# Patient Record
Sex: FEMALE | Race: WHITE | NOT HISPANIC OR LATINO | Employment: OTHER | URBAN - METROPOLITAN AREA
[De-identification: names, ages, dates, MRNs, and addresses within clinical notes are randomized per-mention and may not be internally consistent; named-entity substitution may affect disease eponyms.]

---

## 2017-04-28 ENCOUNTER — TRANSCRIBE ORDERS (OUTPATIENT)
Dept: LAB | Facility: CLINIC | Age: 77
End: 2017-04-28

## 2017-04-28 ENCOUNTER — APPOINTMENT (OUTPATIENT)
Dept: LAB | Facility: CLINIC | Age: 77
End: 2017-04-28
Payer: MEDICARE

## 2017-04-28 DIAGNOSIS — I10 ESSENTIAL HYPERTENSION, MALIGNANT: Primary | ICD-10-CM

## 2017-04-28 DIAGNOSIS — E78.00 PURE HYPERCHOLESTEROLEMIA: ICD-10-CM

## 2017-04-28 DIAGNOSIS — E03.9 UNSPECIFIED HYPOTHYROIDISM: ICD-10-CM

## 2017-04-28 LAB
ALBUMIN SERPL BCP-MCNC: 3.5 G/DL (ref 3.5–5)
ALP SERPL-CCNC: 71 U/L (ref 46–116)
ALT SERPL W P-5'-P-CCNC: 16 U/L (ref 12–78)
ANION GAP SERPL CALCULATED.3IONS-SCNC: 6 MMOL/L (ref 4–13)
AST SERPL W P-5'-P-CCNC: 12 U/L (ref 5–45)
BILIRUB DIRECT SERPL-MCNC: 0.1 MG/DL (ref 0–0.2)
BILIRUB SERPL-MCNC: 0.4 MG/DL (ref 0.2–1)
BUN SERPL-MCNC: 19 MG/DL (ref 5–25)
CALCIUM SERPL-MCNC: 9.5 MG/DL (ref 8.3–10.1)
CHLORIDE SERPL-SCNC: 104 MMOL/L (ref 100–108)
CHOLEST SERPL-MCNC: 159 MG/DL (ref 50–200)
CK SERPL-CCNC: 57 U/L (ref 26–192)
CO2 SERPL-SCNC: 30 MMOL/L (ref 21–32)
CREAT SERPL-MCNC: 0.78 MG/DL (ref 0.6–1.3)
GFR SERPL CREATININE-BSD FRML MDRD: >60 ML/MIN/1.73SQ M
GLUCOSE P FAST SERPL-MCNC: 105 MG/DL (ref 65–99)
HDLC SERPL-MCNC: 52 MG/DL (ref 40–60)
LDLC SERPL CALC-MCNC: 74 MG/DL (ref 0–100)
POTASSIUM SERPL-SCNC: 4.2 MMOL/L (ref 3.5–5.3)
PROT SERPL-MCNC: 7.6 G/DL (ref 6.4–8.2)
SODIUM SERPL-SCNC: 140 MMOL/L (ref 136–145)
TRIGL SERPL-MCNC: 166 MG/DL
TSH SERPL DL<=0.05 MIU/L-ACNC: 0.16 UIU/ML (ref 0.36–3.74)

## 2017-04-28 PROCEDURE — 80048 BASIC METABOLIC PNL TOTAL CA: CPT

## 2017-04-28 PROCEDURE — 80061 LIPID PANEL: CPT

## 2017-04-28 PROCEDURE — 80076 HEPATIC FUNCTION PANEL: CPT

## 2017-04-28 PROCEDURE — 82550 ASSAY OF CK (CPK): CPT

## 2017-04-28 PROCEDURE — 84443 ASSAY THYROID STIM HORMONE: CPT

## 2017-04-28 PROCEDURE — 36415 COLL VENOUS BLD VENIPUNCTURE: CPT

## 2017-05-17 ENCOUNTER — ALLSCRIPTS OFFICE VISIT (OUTPATIENT)
Dept: OTHER | Facility: OTHER | Age: 77
End: 2017-05-17

## 2017-05-17 ENCOUNTER — TRANSCRIBE ORDERS (OUTPATIENT)
Dept: ADMINISTRATIVE | Facility: HOSPITAL | Age: 77
End: 2017-05-17

## 2017-05-17 DIAGNOSIS — G45.9 TRANSIENT CEREBRAL ISCHEMIC ATTACK: ICD-10-CM

## 2017-05-17 DIAGNOSIS — G45.9 UNSPECIFIED TRANSIENT CEREBRAL ISCHEMIA: Primary | ICD-10-CM

## 2017-05-22 ENCOUNTER — TRANSCRIBE ORDERS (OUTPATIENT)
Dept: LAB | Facility: CLINIC | Age: 77
End: 2017-05-22

## 2017-05-22 ENCOUNTER — APPOINTMENT (OUTPATIENT)
Dept: LAB | Facility: CLINIC | Age: 77
End: 2017-05-22
Payer: MEDICARE

## 2017-05-22 DIAGNOSIS — G45.9 TRANSIENT CEREBRAL ISCHEMIC ATTACK: ICD-10-CM

## 2017-05-22 LAB
EST. AVERAGE GLUCOSE BLD GHB EST-MCNC: 128 MG/DL
HBA1C MFR BLD: 6.1 % (ref 4.2–6.3)

## 2017-05-22 PROCEDURE — 36415 COLL VENOUS BLD VENIPUNCTURE: CPT

## 2017-05-22 PROCEDURE — 83036 HEMOGLOBIN GLYCOSYLATED A1C: CPT

## 2017-05-24 ENCOUNTER — HOSPITAL ENCOUNTER (OUTPATIENT)
Dept: RADIOLOGY | Facility: HOSPITAL | Age: 77
Discharge: HOME | End: 2017-05-24
Attending: PSYCHIATRY & NEUROLOGY
Payer: MEDICARE

## 2017-05-24 DIAGNOSIS — G45.9 TRANSIENT CEREBRAL ISCHEMIC ATTACK: ICD-10-CM

## 2017-05-24 PROCEDURE — 70549 MR ANGIOGRAPH NECK W/O&W/DYE: CPT

## 2017-05-24 PROCEDURE — 70544 MR ANGIOGRAPHY HEAD W/O DYE: CPT

## 2017-05-24 PROCEDURE — A9577 INJ MULTIHANCE: HCPCS | Performed by: PSYCHIATRY & NEUROLOGY

## 2017-05-24 RX ADMIN — GADOBENATE DIMEGLUMINE 16 ML: 529 INJECTION, SOLUTION INTRAVENOUS at 08:09

## 2017-05-25 ENCOUNTER — HOSPITAL ENCOUNTER (OUTPATIENT)
Dept: NON INVASIVE DIAGNOSTICS | Facility: HOSPITAL | Age: 77
Discharge: HOME | End: 2017-05-25
Attending: PSYCHIATRY & NEUROLOGY
Payer: MEDICARE

## 2017-05-25 DIAGNOSIS — G45.9 TRANSIENT CEREBRAL ISCHEMIC ATTACK: ICD-10-CM

## 2017-05-25 PROCEDURE — 93306 TTE W/DOPPLER COMPLETE: CPT

## 2017-07-12 ENCOUNTER — TRANSCRIBE ORDERS (OUTPATIENT)
Dept: LAB | Facility: CLINIC | Age: 77
End: 2017-07-12

## 2017-07-12 ENCOUNTER — APPOINTMENT (OUTPATIENT)
Dept: LAB | Facility: CLINIC | Age: 77
End: 2017-07-12
Payer: MEDICARE

## 2017-07-12 DIAGNOSIS — E03.9 UNSPECIFIED HYPOTHYROIDISM: Primary | ICD-10-CM

## 2017-07-12 LAB — TSH SERPL DL<=0.05 MIU/L-ACNC: 7.75 UIU/ML (ref 0.36–3.74)

## 2017-07-12 PROCEDURE — 84443 ASSAY THYROID STIM HORMONE: CPT

## 2017-07-12 PROCEDURE — 36415 COLL VENOUS BLD VENIPUNCTURE: CPT

## 2017-08-22 ENCOUNTER — APPOINTMENT (OUTPATIENT)
Dept: LAB | Facility: CLINIC | Age: 77
End: 2017-08-22
Payer: MEDICARE

## 2017-08-22 ENCOUNTER — TRANSCRIBE ORDERS (OUTPATIENT)
Dept: LAB | Facility: CLINIC | Age: 77
End: 2017-08-22

## 2017-08-22 DIAGNOSIS — B96.20 E. COLI BACTEREMIA: Primary | ICD-10-CM

## 2017-08-22 DIAGNOSIS — R78.81 E. COLI BACTEREMIA: Primary | ICD-10-CM

## 2017-08-22 DIAGNOSIS — Q87.0: ICD-10-CM

## 2017-08-22 LAB
25(OH)D3 SERPL-MCNC: 36.3 NG/ML (ref 30–100)
ANION GAP SERPL CALCULATED.3IONS-SCNC: 7 MMOL/L (ref 4–13)
BUN SERPL-MCNC: 18 MG/DL (ref 5–25)
CALCIUM SERPL-MCNC: 9.5 MG/DL (ref 8.3–10.1)
CHLORIDE SERPL-SCNC: 101 MMOL/L (ref 100–108)
CO2 SERPL-SCNC: 29 MMOL/L (ref 21–32)
CREAT SERPL-MCNC: 1.03 MG/DL (ref 0.6–1.3)
GFR SERPL CREATININE-BSD FRML MDRD: 52 ML/MIN/1.73SQ M
GLUCOSE P FAST SERPL-MCNC: 105 MG/DL (ref 65–99)
POTASSIUM SERPL-SCNC: 3.6 MMOL/L (ref 3.5–5.3)
SODIUM SERPL-SCNC: 137 MMOL/L (ref 136–145)
TSH SERPL DL<=0.05 MIU/L-ACNC: 31.7 UIU/ML (ref 0.36–3.74)

## 2017-08-22 PROCEDURE — 80048 BASIC METABOLIC PNL TOTAL CA: CPT

## 2017-08-22 PROCEDURE — 82306 VITAMIN D 25 HYDROXY: CPT

## 2017-08-22 PROCEDURE — 84443 ASSAY THYROID STIM HORMONE: CPT

## 2017-08-22 PROCEDURE — 36415 COLL VENOUS BLD VENIPUNCTURE: CPT

## 2017-10-24 ENCOUNTER — TRANSCRIBE ORDERS (OUTPATIENT)
Dept: LAB | Facility: CLINIC | Age: 77
End: 2017-10-24

## 2017-10-24 ENCOUNTER — APPOINTMENT (OUTPATIENT)
Dept: LAB | Facility: CLINIC | Age: 77
End: 2017-10-24
Payer: MEDICARE

## 2017-10-24 DIAGNOSIS — E03.9 HYPOTHYROIDISM, UNSPECIFIED TYPE: Primary | ICD-10-CM

## 2017-10-24 PROCEDURE — 36415 COLL VENOUS BLD VENIPUNCTURE: CPT

## 2017-10-24 PROCEDURE — 84443 ASSAY THYROID STIM HORMONE: CPT

## 2017-10-25 LAB — TSH SERPL DL<=0.05 MIU/L-ACNC: 4.82 UIU/ML (ref 0.36–3.74)

## 2018-01-14 VITALS
SYSTOLIC BLOOD PRESSURE: 135 MMHG | BODY MASS INDEX: 30.3 KG/M2 | DIASTOLIC BLOOD PRESSURE: 82 MMHG | WEIGHT: 171 LBS | HEIGHT: 63 IN | HEART RATE: 80 BPM

## 2018-04-13 ENCOUNTER — APPOINTMENT (OUTPATIENT)
Dept: LAB | Facility: CLINIC | Age: 78
End: 2018-04-13
Payer: MEDICARE

## 2018-04-13 ENCOUNTER — TRANSCRIBE ORDERS (OUTPATIENT)
Dept: LAB | Facility: CLINIC | Age: 78
End: 2018-04-13

## 2018-04-13 DIAGNOSIS — R35.0 URINARY FREQUENCY: Primary | ICD-10-CM

## 2018-04-13 DIAGNOSIS — E03.4 IDIOPATHIC ATROPHIC HYPOTHYROIDISM: Primary | ICD-10-CM

## 2018-04-13 DIAGNOSIS — E78.00 PURE HYPERCHOLESTEROLEMIA: ICD-10-CM

## 2018-04-13 DIAGNOSIS — N18.30 CHRONIC KIDNEY DISEASE, STAGE III (MODERATE) (HCC): ICD-10-CM

## 2018-04-13 LAB
ANION GAP SERPL CALCULATED.3IONS-SCNC: 3 MMOL/L (ref 4–13)
BACTERIA UR QL AUTO: ABNORMAL /HPF
BILIRUB UR QL STRIP: NEGATIVE
BUN SERPL-MCNC: 20 MG/DL (ref 5–25)
CALCIUM SERPL-MCNC: 9.7 MG/DL
CHLORIDE SERPL-SCNC: 105 MMOL/L (ref 100–108)
CHOLEST SERPL-MCNC: 197 MG/DL (ref 50–200)
CLARITY UR: CLEAR
CO2 SERPL-SCNC: 30 MMOL/L (ref 21–32)
COLOR UR: YELLOW
CREAT SERPL-MCNC: 0.98 MG/DL (ref 0.6–1.3)
EST. AVERAGE GLUCOSE BLD GHB EST-MCNC: 137 MG/DL
GFR SERPL CREATININE-BSD FRML MDRD: 55 ML/MIN/1.73SQ M
GLUCOSE P FAST SERPL-MCNC: 120 MG/DL (ref 65–99)
GLUCOSE UR STRIP-MCNC: NEGATIVE MG/DL
HBA1C MFR BLD: 6.4 % (ref 4.2–6.3)
HDLC SERPL-MCNC: 44 MG/DL (ref 40–60)
HGB UR QL STRIP.AUTO: ABNORMAL
HYALINE CASTS #/AREA URNS LPF: ABNORMAL /LPF
KETONES UR STRIP-MCNC: NEGATIVE MG/DL
LDLC SERPL CALC-MCNC: 99 MG/DL (ref 0–100)
LEUKOCYTE ESTERASE UR QL STRIP: ABNORMAL
NITRITE UR QL STRIP: NEGATIVE
NON-SQ EPI CELLS URNS QL MICRO: ABNORMAL /HPF
NONHDLC SERPL-MCNC: 153 MG/DL
PH UR STRIP.AUTO: 5.5 [PH] (ref 4.5–8)
POTASSIUM SERPL-SCNC: 3.9 MMOL/L (ref 3.5–5.3)
PROT UR STRIP-MCNC: NEGATIVE MG/DL
RBC #/AREA URNS AUTO: ABNORMAL /HPF
SODIUM SERPL-SCNC: 138 MMOL/L (ref 136–145)
SP GR UR STRIP.AUTO: 1.02 (ref 1–1.03)
TRIGL SERPL-MCNC: 271 MG/DL
TSH SERPL DL<=0.05 MIU/L-ACNC: 2.08 UIU/ML (ref 0.36–3.74)
UROBILINOGEN UR QL STRIP.AUTO: 0.2 E.U./DL
WBC #/AREA URNS AUTO: ABNORMAL /HPF

## 2018-04-13 PROCEDURE — 36415 COLL VENOUS BLD VENIPUNCTURE: CPT

## 2018-04-13 PROCEDURE — 81001 URINALYSIS AUTO W/SCOPE: CPT

## 2018-04-13 PROCEDURE — 80048 BASIC METABOLIC PNL TOTAL CA: CPT

## 2018-04-13 PROCEDURE — 83036 HEMOGLOBIN GLYCOSYLATED A1C: CPT

## 2018-04-13 PROCEDURE — 84443 ASSAY THYROID STIM HORMONE: CPT

## 2018-04-13 PROCEDURE — 80061 LIPID PANEL: CPT

## 2018-08-21 ENCOUNTER — OFFICE VISIT (OUTPATIENT)
Dept: PODIATRY | Facility: CLINIC | Age: 78
End: 2018-08-21
Payer: MEDICARE

## 2018-08-21 VITALS
BODY MASS INDEX: 30.35 KG/M2 | HEIGHT: 63 IN | RESPIRATION RATE: 17 BRPM | DIASTOLIC BLOOD PRESSURE: 76 MMHG | WEIGHT: 171.3 LBS | SYSTOLIC BLOOD PRESSURE: 138 MMHG | HEART RATE: 64 BPM

## 2018-08-21 DIAGNOSIS — L03.031 PARONYCHIA OF TOENAIL OF RIGHT FOOT: ICD-10-CM

## 2018-08-21 DIAGNOSIS — M79.671 RIGHT FOOT PAIN: Primary | ICD-10-CM

## 2018-08-21 DIAGNOSIS — L60.0 INGROWN TOENAIL: ICD-10-CM

## 2018-08-21 PROCEDURE — 99202 OFFICE O/P NEW SF 15 MIN: CPT | Performed by: PODIATRIST

## 2018-08-21 NOTE — PROGRESS NOTES
Assessment/Plan:  Pain  Paronychia  Ingrown toenail  Plan  Patient educated on condition  Nail debrided  Patient will be started on topical antibiotic  We will schedule nail procedure  No problem-specific Assessment & Plan notes found for this encounter  There are no diagnoses linked to this encounter  Subjective:  Patient has complaint of longstanding pain in her right big toe  She suffers from chronic ingrown toenail  She gets multiple episodes  The    Past Medical History:   Diagnosis Date    Hyperlipidemia     Hypertension     Hypothyroidism     MI, old        Past Surgical History:   Procedure Laterality Date    CHOLECYSTECTOMY      CORONARY ANGIOPLASTY      2001     TONSILLECTOMY         Allergies   Allergen Reactions    Iodinated Diagnostic Agents          Current Outpatient Prescriptions:     atenolol (TENORMIN) 25 mg tablet, Take 25 mg by mouth daily  , Disp: , Rfl:     Biotin 2500 MCG CAPS, Take by mouth daily  , Disp: , Rfl:     Cholecalciferol (VITAMIN D-3 PO), Take by mouth daily  , Disp: , Rfl:     olmesartan-hydrochlorothiazide (BENICAR HCT) 20-12 5 MG per tablet, Take 1 tablet by mouth daily  , Disp: , Rfl:     Omega-3 Fatty Acids (FISH OIL) 1200 MG CAPS, Take by mouth 2 (two) times a day , Disp: , Rfl:     ondansetron (ZOFRAN) 4 mg tablet, Take 1 tablet (4 mg total) by mouth every 8 (eight) hours as needed for nausea or vomiting for up to 10 doses  , Disp: 10 tablet, Rfl: 0    simvastatin (ZOCOR) 20 mg tablet, Take 20 mg by mouth daily at bedtime  , Disp: , Rfl:     thyroid (ARMOUR) 120 MG tablet, Take 120 mg by mouth daily  , Disp: , Rfl:     Patient Active Problem List   Diagnosis    Chest pain          Patient ID: Nabil Hernandez is a 76 y o  female      HPI    The following portions of the patient's history were reviewed and updated as appropriate: allergies, current medications, past family history, past medical history, past social history, past surgical history and problem list     Review of Systems      Objective:  Patient's shoes and socks removed  Foot Exam    General  General Appearance: appears stated age and healthy   Orientation: alert and oriented to person, place, and time   Affect: appropriate   Gait: antalgic       Right Foot/Ankle     Inspection and Palpation  Ecchymosis: none  Tenderness: (Right hallux, fibular nail groove)  Swelling: (Right hallux)  Arch: pes planus  Hammertoes: fifth toe  Hallux valgus: no  Hallux limitus: yes  Skin Exam: callus and dry skin;     Neurovascular  Dorsalis pedis: 2+  Superficial peroneal nerve sensation: diminished  Deep peroneal nerve sensation: diminished  Achilles reflex: 2+    Muscle Strength  Ankle dorsiflexion: 4+    Range of Motion    Passive  Ankle dorsiflexion: 5      Tests  Too many toes: positive       Left Foot/Ankle      Inspection and Palpation  Ecchymosis: none  Tenderness: none   Swelling: none   Arch: pes planus  Hammertoes: fifth toe  Skin Exam: callus and dry skin;     Neurovascular  Dorsalis pedis: 2+  Posterior tibial: 2+  Superficial peroneal nerve sensation: diminished  Deep peroneal nerve sensation: diminished  Achilles reflex: 2+    Muscle Strength  Ankle dorsiflexion: 4+    Range of Motion    Passive  Ankle dorsiflexion: 5      Tests  Too many toes: positive         Physical Exam   Constitutional: She appears well-developed and well-nourished  Cardiovascular: Normal rate and regular rhythm  Pulses:       Dorsalis pedis pulses are 2+ on the right side, and 2+ on the left side  Posterior tibial pulses are 2+ on the left side  Feet:   Right Foot:   Skin Integrity: Positive for callus and dry skin  Left Foot:   Skin Integrity: Positive for callus and dry skin  Neurological:   Reflex Scores:       Achilles reflexes are 2+ on the right side and 2+ on the left side  Skin:   Right hallux demonstrates wide incurvated toenail  The fibular aspect the nail plate has become ingrown  There is mild paronychia  No pus

## 2018-08-24 ENCOUNTER — TRANSCRIBE ORDERS (OUTPATIENT)
Dept: ADMINISTRATIVE | Facility: HOSPITAL | Age: 78
End: 2018-08-24

## 2018-08-24 DIAGNOSIS — M81.0 SENILE OSTEOPOROSIS: Primary | ICD-10-CM

## 2018-09-12 ENCOUNTER — OFFICE VISIT (OUTPATIENT)
Dept: PODIATRY | Facility: CLINIC | Age: 78
End: 2018-09-12
Payer: MEDICARE

## 2018-09-12 ENCOUNTER — HOSPITAL ENCOUNTER (OUTPATIENT)
Dept: RADIOLOGY | Facility: HOSPITAL | Age: 78
Discharge: HOME | End: 2018-09-12
Attending: FAMILY MEDICINE
Payer: MEDICARE

## 2018-09-12 VITALS
BODY MASS INDEX: 30.35 KG/M2 | HEIGHT: 63 IN | WEIGHT: 171.3 LBS | DIASTOLIC BLOOD PRESSURE: 70 MMHG | HEART RATE: 67 BPM | RESPIRATION RATE: 16 BRPM | SYSTOLIC BLOOD PRESSURE: 131 MMHG

## 2018-09-12 DIAGNOSIS — L03.031 PARONYCHIA OF TOENAIL OF RIGHT FOOT: ICD-10-CM

## 2018-09-12 DIAGNOSIS — M81.0 SENILE OSTEOPOROSIS: ICD-10-CM

## 2018-09-12 DIAGNOSIS — L60.0 INGROWN TOENAIL: Primary | ICD-10-CM

## 2018-09-12 DIAGNOSIS — M79.671 RIGHT FOOT PAIN: ICD-10-CM

## 2018-09-12 PROCEDURE — 11750 EXCISION NAIL&NAIL MATRIX: CPT | Performed by: PODIATRIST

## 2018-09-12 PROCEDURE — 77080 DXA BONE DENSITY AXIAL: CPT

## 2018-09-12 RX ORDER — LEVOTHYROXINE SODIUM 0.1 MG/1
137 TABLET ORAL DAILY
COMMUNITY

## 2018-09-12 NOTE — PROGRESS NOTES
Nail removal  Date/Time: 9/12/2018 3:43 PM  Performed by: Zofia Kim by: Anam Lacey     Patient location:  Clinic  Other Assisting Provider: No    Consent:     Consent obtained:  Verbal and written    Consent given by:  Patient    Risks discussed:  Bleeding, incomplete removal, infection, pain and permanent nail deformity    Alternatives discussed:  No treatment, delayed treatment, alternative treatment and observation  Universal protocol:     Procedure explained and questions answered to patient or proxy's satisfaction: yes      Site/side marked: yes      Immediately prior to procedure a time out was called: yes      Patient identity confirmed:  Verbally with patient  Location:     Foot:  R big toe (Fibular aspect)  Pre-procedure details:     Skin preparation:  Betadine    Preparation: Patient was prepped and draped in the usual sterile fashion    Anesthesia (see MAR for exact dosages): Anesthesia method: Digital nerve block performed with 6 cc of 2% lidocaine plain  Nail Removal:     Nail removed:  1/5 (Partial 1/5 nail plate removed  This was done on the fibular aspect)    Nail bed sutured: no      Removed nail replaced and anchored: no    Trephination:     Subungual hematoma drained: no    Ingrown nail:     Wedge excision of skin: no      Nail matrix amount removed: Chemical matrixectomy performed  3, 30 second applications of phenol applied to nail matrix on the fibular aspect  Post-procedure details:     Dressing:  4x4 sterile gauze, antibiotic ointment and gauze roll    Patient tolerance of procedure:   Tolerated well, no immediate complications         Foot Exam      Foot Exam     General  General Appearance: appears stated age and healthy   Orientation: alert and oriented to person, place, and time   Affect: appropriate   Gait: antalgic         Right Foot/Ankle      Inspection and Palpation  Ecchymosis: none  Tenderness: (Right hallux, fibular nail groove)  Swelling: (Right hallux)  Arch: pes planus  Hammertoes: fifth toe  Hallux valgus: no  Hallux limitus: yes  Skin Exam: callus and dry skin;      Neurovascular  Dorsalis pedis: 2+  Superficial peroneal nerve sensation: diminished  Deep peroneal nerve sensation: diminished  Achilles reflex: 2+     Muscle Strength  Ankle dorsiflexion: 4+     Range of Motion     Passive  Ankle dorsiflexion: 5        Tests  Too many toes: positive         Left Foot/Ankle       Inspection and Palpation  Ecchymosis: none  Tenderness: none   Swelling: none   Arch: pes planus  Hammertoes: fifth toe  Skin Exam: callus and dry skin;      Neurovascular  Dorsalis pedis: 2+  Posterior tibial: 2+  Superficial peroneal nerve sensation: diminished  Deep peroneal nerve sensation: diminished  Achilles reflex: 2+     Muscle Strength  Ankle dorsiflexion: 4+     Range of Motion     Passive  Ankle dorsiflexion: 5        Tests  Too many toes: positive         Physical Exam   Constitutional: She appears well-developed and well-nourished  Cardiovascular: Normal rate and regular rhythm  Pulses:       Dorsalis pedis pulses are 2+ on the right side, and 2+ on the left side  Posterior tibial pulses are 2+ on the left side  Feet:   Right Foot:   Skin Integrity: Positive for callus and dry skin  Left Foot:   Skin Integrity: Positive for callus and dry skin  Neurological:   Reflex Scores:       Achilles reflexes are 2+ on the right side and 2+ on the left side  Skin:   Right hallux demonstrates wide incurvated toenail  The fibular aspect the nail plate has become ingrown  There is mild paronychia    No pus

## 2018-09-26 ENCOUNTER — OFFICE VISIT (OUTPATIENT)
Dept: PODIATRY | Facility: CLINIC | Age: 78
End: 2018-09-26
Payer: MEDICARE

## 2018-09-26 VITALS
HEIGHT: 63 IN | BODY MASS INDEX: 30.35 KG/M2 | SYSTOLIC BLOOD PRESSURE: 129 MMHG | WEIGHT: 171.3 LBS | HEART RATE: 65 BPM | RESPIRATION RATE: 16 BRPM | DIASTOLIC BLOOD PRESSURE: 77 MMHG

## 2018-09-26 DIAGNOSIS — S91.109A OPEN WOUND OF TOE, INITIAL ENCOUNTER: Primary | ICD-10-CM

## 2018-09-26 PROCEDURE — 99212 OFFICE O/P EST SF 10 MIN: CPT | Performed by: PODIATRIST

## 2018-09-26 NOTE — PROGRESS NOTES
Assessment/Plan:   Open wound of toe  Plan  Wound debrided  Gentian violet applied  Patient will bandage daily for 1 week     There are no diagnoses linked to this encounter  Subjective:  Patient is status post procedure  She is doing well  She has minimal pain    Patient ID: Moraima Hendrix is a 76 y o  female  Past Medical History:   Diagnosis Date    Hyperlipidemia     Hypertension     Hypothyroidism     MI, old        Past Surgical History:   Procedure Laterality Date    CHOLECYSTECTOMY      CORONARY ANGIOPLASTY      2001     TONSILLECTOMY         Allergies   Allergen Reactions    Iodinated Diagnostic Agents          Current Outpatient Prescriptions:     atenolol (TENORMIN) 25 mg tablet, Take 25 mg by mouth daily  , Disp: , Rfl:     Biotin 2500 MCG CAPS, Take by mouth daily  , Disp: , Rfl:     Cholecalciferol (VITAMIN D-3 PO), Take by mouth daily  , Disp: , Rfl:     levothyroxine 100 mcg tablet, Take 100 mcg by mouth daily, Disp: , Rfl:     mupirocin (BACTROBAN) 2 % ointment, Apply topically daily Apply Band-Aid after applying medication to wound, Disp: 15 g, Rfl: 0    olmesartan-hydrochlorothiazide (BENICAR HCT) 20-12 5 MG per tablet, Take 1 tablet by mouth daily  , Disp: , Rfl:     Omega-3 Fatty Acids (FISH OIL) 1200 MG CAPS, Take by mouth 2 (two) times a day , Disp: , Rfl:     ondansetron (ZOFRAN) 4 mg tablet, Take 1 tablet (4 mg total) by mouth every 8 (eight) hours as needed for nausea or vomiting for up to 10 doses  , Disp: 10 tablet, Rfl: 0    simvastatin (ZOCOR) 20 mg tablet, Take 20 mg by mouth daily at bedtime  , Disp: , Rfl:     thyroid (ARMOUR) 120 MG tablet, Take 120 mg by mouth daily  , Disp: , Rfl:     Patient Active Problem List   Diagnosis    Chest pain    Right foot pain    Ingrown toenail    Paronychia of toenail of right foot       HPI    The following portions of the patient's history were reviewed and updated as appropriate: allergies, current medications, past family history, past medical history, past social history, past surgical history and problem list     Review of Systems      Historical Information   Past Medical History:   Diagnosis Date    Hyperlipidemia     Hypertension     Hypothyroidism     MI, old      Past Surgical History:   Procedure Laterality Date    CHOLECYSTECTOMY      CORONARY ANGIOPLASTY      2001     TONSILLECTOMY       Social History   History   Alcohol Use No     History   Drug Use No     Family History:   Family History   Problem Relation Age of Onset    Lung cancer Mother     Cirrhosis Father     Heart attack Sister        Meds/Allergies   Allergies   Allergen Reactions    Iodinated Diagnostic Agents        Current Outpatient Prescriptions on File Prior to Visit   Medication Sig Dispense Refill    atenolol (TENORMIN) 25 mg tablet Take 25 mg by mouth daily   Biotin 2500 MCG CAPS Take by mouth daily   Cholecalciferol (VITAMIN D-3 PO) Take by mouth daily   levothyroxine 100 mcg tablet Take 100 mcg by mouth daily      mupirocin (BACTROBAN) 2 % ointment Apply topically daily Apply Band-Aid after applying medication to wound 15 g 0    olmesartan-hydrochlorothiazide (BENICAR HCT) 20-12 5 MG per tablet Take 1 tablet by mouth daily   Omega-3 Fatty Acids (FISH OIL) 1200 MG CAPS Take by mouth 2 (two) times a day   ondansetron (ZOFRAN) 4 mg tablet Take 1 tablet (4 mg total) by mouth every 8 (eight) hours as needed for nausea or vomiting for up to 10 doses  10 tablet 0    simvastatin (ZOCOR) 20 mg tablet Take 20 mg by mouth daily at bedtime   thyroid (ARMOUR) 120 MG tablet Take 120 mg by mouth daily  No current facility-administered medications on file prior to visit  Objective:  Patient's shoes and socks removed  Foot ExamPhysical Exam         Objective:  Patient's shoes and socks removed     Foot Exam     General  General Appearance: appears stated age and healthy   Orientation: alert and oriented to person, place, and time   Affect: appropriate   Gait: antalgic         Right Foot/Ankle      Inspection and Palpation  Ecchymosis: none  Tenderness: (Right hallux, fibular nail groove)  Swelling: (Right hallux)  Arch: pes planus  Hammertoes: fifth toe  Hallux valgus: no  Hallux limitus: yes  Skin Exam: callus and dry skin;      Neurovascular  Dorsalis pedis: 2+  Superficial peroneal nerve sensation: diminished  Deep peroneal nerve sensation: diminished  Achilles reflex: 2+     Muscle Strength  Ankle dorsiflexion: 4+     Range of Motion     Passive  Ankle dorsiflexion: 5        Tests  Too many toes: positive         Left Foot/Ankle       Inspection and Palpation  Ecchymosis: none  Tenderness: none   Swelling: none   Arch: pes planus  Hammertoes: fifth toe  Skin Exam: callus and dry skin;      Neurovascular  Dorsalis pedis: 2+  Posterior tibial: 2+  Superficial peroneal nerve sensation: diminished  Deep peroneal nerve sensation: diminished  Achilles reflex: 2+     Muscle Strength  Ankle dorsiflexion: 4+     Range of Motion     Passive  Ankle dorsiflexion: 5        Tests  Too many toes: positive         Physical Exam   Constitutional: She appears well-developed and well-nourished  Cardiovascular: Normal rate and regular rhythm  Pulses:       Dorsalis pedis pulses are 2+ on the right side, and 2+ on the left side  Posterior tibial pulses are 2+ on the left side  Feet:   Right Foot:   Skin Integrity: Positive for callus and dry skin  Left Foot:   Skin Integrity: Positive for callus and dry skin  Neurological:   Reflex Scores:       Achilles reflexes are 2+ on the right side and 2+ on the left side  Skin:   Right hallux demonstrates ulcerated nail groove  No paronychia    No evidence of nail recurrence

## 2018-10-02 ENCOUNTER — APPOINTMENT (OUTPATIENT)
Dept: LAB | Facility: CLINIC | Age: 78
End: 2018-10-02
Payer: MEDICARE

## 2018-10-02 ENCOUNTER — TRANSCRIBE ORDERS (OUTPATIENT)
Dept: LAB | Facility: CLINIC | Age: 78
End: 2018-10-02

## 2018-10-02 DIAGNOSIS — E55.9 VITAMIN D DEFICIENCY: ICD-10-CM

## 2018-10-02 DIAGNOSIS — S40.871A OTHER SUPERFICIAL BITE OF RIGHT UPPER ARM, INITIAL ENCOUNTER: Primary | ICD-10-CM

## 2018-10-02 DIAGNOSIS — M25.50 PAIN IN JOINT, MULTIPLE SITES: ICD-10-CM

## 2018-10-02 DIAGNOSIS — Z13.9 SCREENING FOR CONDITION: ICD-10-CM

## 2018-10-02 DIAGNOSIS — E03.4 IDIOPATHIC ATROPHIC HYPOTHYROIDISM: ICD-10-CM

## 2018-10-02 LAB
25(OH)D3 SERPL-MCNC: 20.9 NG/ML (ref 30–100)
ALBUMIN SERPL BCP-MCNC: 3.2 G/DL (ref 3.5–5)
ALP SERPL-CCNC: 65 U/L (ref 46–116)
ALT SERPL W P-5'-P-CCNC: 16 U/L (ref 12–78)
ANION GAP SERPL CALCULATED.3IONS-SCNC: 7 MMOL/L (ref 4–13)
AST SERPL W P-5'-P-CCNC: 12 U/L (ref 5–45)
BASOPHILS # BLD AUTO: 0.07 THOUSANDS/ΜL (ref 0–0.1)
BASOPHILS NFR BLD AUTO: 1 % (ref 0–1)
BILIRUB SERPL-MCNC: 0.25 MG/DL (ref 0.2–1)
BUN SERPL-MCNC: 20 MG/DL (ref 5–25)
CALCIUM SERPL-MCNC: 8.8 MG/DL (ref 8.3–10.1)
CHLORIDE SERPL-SCNC: 102 MMOL/L (ref 100–108)
CO2 SERPL-SCNC: 26 MMOL/L (ref 21–32)
CREAT SERPL-MCNC: 1.09 MG/DL (ref 0.6–1.3)
EOSINOPHIL # BLD AUTO: 0.36 THOUSAND/ΜL (ref 0–0.61)
EOSINOPHIL NFR BLD AUTO: 4 % (ref 0–6)
ERYTHROCYTE [DISTWIDTH] IN BLOOD BY AUTOMATED COUNT: 13.9 % (ref 11.6–15.1)
ERYTHROCYTE [SEDIMENTATION RATE] IN BLOOD: 36 MM/HOUR (ref 0–20)
EST. AVERAGE GLUCOSE BLD GHB EST-MCNC: 137 MG/DL
GFR SERPL CREATININE-BSD FRML MDRD: 48 ML/MIN/1.73SQ M
GLUCOSE P FAST SERPL-MCNC: 116 MG/DL (ref 65–99)
HBA1C MFR BLD: 6.4 % (ref 4.2–6.3)
HCT VFR BLD AUTO: 36.3 % (ref 34.8–46.1)
HGB BLD-MCNC: 11.4 G/DL (ref 11.5–15.4)
IMM GRANULOCYTES # BLD AUTO: 0.05 THOUSAND/UL (ref 0–0.2)
IMM GRANULOCYTES NFR BLD AUTO: 1 % (ref 0–2)
LYMPHOCYTES # BLD AUTO: 4.49 THOUSANDS/ΜL (ref 0.6–4.47)
LYMPHOCYTES NFR BLD AUTO: 43 % (ref 14–44)
MCH RBC QN AUTO: 30.1 PG (ref 26.8–34.3)
MCHC RBC AUTO-ENTMCNC: 31.4 G/DL (ref 31.4–37.4)
MCV RBC AUTO: 96 FL (ref 82–98)
MONOCYTES # BLD AUTO: 0.86 THOUSAND/ΜL (ref 0.17–1.22)
MONOCYTES NFR BLD AUTO: 9 % (ref 4–12)
NEUTROPHILS # BLD AUTO: 4.21 THOUSANDS/ΜL (ref 1.85–7.62)
NEUTS SEG NFR BLD AUTO: 42 % (ref 43–75)
NRBC BLD AUTO-RTO: 0 /100 WBCS
PLATELET # BLD AUTO: 211 THOUSANDS/UL (ref 149–390)
PMV BLD AUTO: 10.7 FL (ref 8.9–12.7)
POTASSIUM SERPL-SCNC: 4.1 MMOL/L (ref 3.5–5.3)
PROT SERPL-MCNC: 7.2 G/DL (ref 6.4–8.2)
RBC # BLD AUTO: 3.79 MILLION/UL (ref 3.81–5.12)
SODIUM SERPL-SCNC: 135 MMOL/L (ref 136–145)
TSH SERPL DL<=0.05 MIU/L-ACNC: 12.9 UIU/ML (ref 0.36–3.74)
WBC # BLD AUTO: 10.04 THOUSAND/UL (ref 4.31–10.16)

## 2018-10-02 PROCEDURE — 85025 COMPLETE CBC W/AUTO DIFF WBC: CPT

## 2018-10-02 PROCEDURE — 36415 COLL VENOUS BLD VENIPUNCTURE: CPT

## 2018-10-02 PROCEDURE — 83036 HEMOGLOBIN GLYCOSYLATED A1C: CPT

## 2018-10-02 PROCEDURE — 82306 VITAMIN D 25 HYDROXY: CPT

## 2018-10-02 PROCEDURE — 84443 ASSAY THYROID STIM HORMONE: CPT

## 2018-10-02 PROCEDURE — 80053 COMPREHEN METABOLIC PANEL: CPT

## 2018-10-02 PROCEDURE — 85652 RBC SED RATE AUTOMATED: CPT

## 2018-12-04 ENCOUNTER — APPOINTMENT (OUTPATIENT)
Dept: LAB | Facility: CLINIC | Age: 78
End: 2018-12-04
Payer: MEDICARE

## 2018-12-04 ENCOUNTER — TRANSCRIBE ORDERS (OUTPATIENT)
Dept: LAB | Facility: CLINIC | Age: 78
End: 2018-12-04

## 2018-12-04 DIAGNOSIS — E03.4 IDIOPATHIC ATROPHIC HYPOTHYROIDISM: Primary | ICD-10-CM

## 2018-12-04 PROCEDURE — 84443 ASSAY THYROID STIM HORMONE: CPT

## 2018-12-04 PROCEDURE — 36415 COLL VENOUS BLD VENIPUNCTURE: CPT

## 2018-12-05 LAB — TSH SERPL DL<=0.05 MIU/L-ACNC: 0.93 UIU/ML (ref 0.36–3.74)

## 2019-03-04 ENCOUNTER — TRANSCRIBE ORDERS (OUTPATIENT)
Dept: LAB | Facility: CLINIC | Age: 79
End: 2019-03-04

## 2019-03-04 ENCOUNTER — APPOINTMENT (OUTPATIENT)
Dept: LAB | Facility: CLINIC | Age: 79
End: 2019-03-04
Payer: MEDICARE

## 2019-03-04 DIAGNOSIS — N18.30 CHRONIC KIDNEY DISEASE, STAGE III (MODERATE) (HCC): Primary | ICD-10-CM

## 2019-03-04 LAB
ANION GAP SERPL CALCULATED.3IONS-SCNC: 3 MMOL/L (ref 4–13)
BUN SERPL-MCNC: 30 MG/DL (ref 5–25)
CALCIUM SERPL-MCNC: 9.4 MG/DL (ref 8.3–10.1)
CHLORIDE SERPL-SCNC: 105 MMOL/L (ref 100–108)
CO2 SERPL-SCNC: 29 MMOL/L (ref 21–32)
CREAT SERPL-MCNC: 0.94 MG/DL (ref 0.6–1.3)
GFR SERPL CREATININE-BSD FRML MDRD: 57 ML/MIN/1.73SQ M
GLUCOSE P FAST SERPL-MCNC: 104 MG/DL (ref 65–99)
POTASSIUM SERPL-SCNC: 4.2 MMOL/L (ref 3.5–5.3)
SODIUM SERPL-SCNC: 137 MMOL/L (ref 136–145)

## 2019-03-04 PROCEDURE — 80048 BASIC METABOLIC PNL TOTAL CA: CPT

## 2019-03-04 PROCEDURE — 36415 COLL VENOUS BLD VENIPUNCTURE: CPT

## 2019-07-25 ENCOUNTER — TRANSCRIBE ORDERS (OUTPATIENT)
Dept: LAB | Facility: CLINIC | Age: 79
End: 2019-07-25

## 2019-07-25 ENCOUNTER — APPOINTMENT (OUTPATIENT)
Dept: LAB | Facility: CLINIC | Age: 79
End: 2019-07-25
Payer: MEDICARE

## 2019-07-25 DIAGNOSIS — E78.00 PURE HYPERCHOLESTEROLEMIA: ICD-10-CM

## 2019-07-25 DIAGNOSIS — E55.9 VITAMIN D DEFICIENCY, UNSPECIFIED: ICD-10-CM

## 2019-07-25 DIAGNOSIS — E03.4 IDIOPATHIC ATROPHIC HYPOTHYROIDISM: Primary | ICD-10-CM

## 2019-07-25 LAB
ALBUMIN SERPL BCP-MCNC: 3.5 G/DL (ref 3.5–5)
ALP SERPL-CCNC: 71 U/L (ref 46–116)
ALT SERPL W P-5'-P-CCNC: 18 U/L (ref 12–78)
ANION GAP SERPL CALCULATED.3IONS-SCNC: 8 MMOL/L (ref 4–13)
AST SERPL W P-5'-P-CCNC: 23 U/L (ref 5–45)
BILIRUB SERPL-MCNC: 0.28 MG/DL (ref 0.2–1)
BUN SERPL-MCNC: 19 MG/DL (ref 5–25)
CALCIUM SERPL-MCNC: 8.9 MG/DL (ref 8.3–10.1)
CHLORIDE SERPL-SCNC: 105 MMOL/L (ref 100–108)
CHOLEST SERPL-MCNC: 217 MG/DL (ref 50–200)
CO2 SERPL-SCNC: 27 MMOL/L (ref 21–32)
CREAT SERPL-MCNC: 1.02 MG/DL (ref 0.6–1.3)
GFR SERPL CREATININE-BSD FRML MDRD: 52 ML/MIN/1.73SQ M
GLUCOSE P FAST SERPL-MCNC: 112 MG/DL (ref 65–99)
HDLC SERPL-MCNC: 41 MG/DL (ref 40–60)
LDLC SERPL CALC-MCNC: 117 MG/DL (ref 0–100)
NONHDLC SERPL-MCNC: 176 MG/DL
POTASSIUM SERPL-SCNC: 4.1 MMOL/L (ref 3.5–5.3)
PROT SERPL-MCNC: 7.2 G/DL (ref 6.4–8.2)
SODIUM SERPL-SCNC: 140 MMOL/L (ref 136–145)
TRIGL SERPL-MCNC: 297 MG/DL
TSH SERPL DL<=0.05 MIU/L-ACNC: 0.49 UIU/ML (ref 0.36–3.74)

## 2019-07-25 PROCEDURE — 36415 COLL VENOUS BLD VENIPUNCTURE: CPT

## 2019-07-25 PROCEDURE — 84443 ASSAY THYROID STIM HORMONE: CPT

## 2019-07-25 PROCEDURE — 80061 LIPID PANEL: CPT

## 2019-07-25 PROCEDURE — 82306 VITAMIN D 25 HYDROXY: CPT

## 2019-07-25 PROCEDURE — 80053 COMPREHEN METABOLIC PANEL: CPT

## 2019-07-26 LAB — 25(OH)D3 SERPL-MCNC: 33.5 NG/ML (ref 30–100)

## 2019-08-15 ENCOUNTER — TRANSCRIBE ORDERS (OUTPATIENT)
Dept: ADMINISTRATIVE | Facility: HOSPITAL | Age: 79
End: 2019-08-15

## 2019-08-15 DIAGNOSIS — E04.9 ENLARGED THYROID: Primary | ICD-10-CM

## 2019-08-21 ENCOUNTER — HOSPITAL ENCOUNTER (OUTPATIENT)
Dept: RADIOLOGY | Facility: HOSPITAL | Age: 79
Discharge: HOME | End: 2019-08-21
Attending: FAMILY MEDICINE
Payer: MEDICARE

## 2019-08-21 DIAGNOSIS — E04.9 ENLARGED THYROID: ICD-10-CM

## 2019-08-21 PROCEDURE — 76536 US EXAM OF HEAD AND NECK: CPT

## 2019-08-28 ENCOUNTER — TRANSCRIBE ORDERS (OUTPATIENT)
Dept: ADMINISTRATIVE | Facility: HOSPITAL | Age: 79
End: 2019-08-28

## 2019-08-28 DIAGNOSIS — R10.9 ABDOMINAL PAIN, UNSPECIFIED ABDOMINAL LOCATION: Primary | ICD-10-CM

## 2019-09-18 ENCOUNTER — HOSPITAL ENCOUNTER (OUTPATIENT)
Dept: RADIOLOGY | Facility: HOSPITAL | Age: 79
Discharge: HOME | End: 2019-09-18
Attending: INTERNAL MEDICINE
Payer: MEDICARE

## 2019-09-18 DIAGNOSIS — R10.9 ABDOMINAL PAIN, UNSPECIFIED ABDOMINAL LOCATION: ICD-10-CM

## 2019-09-18 PROCEDURE — 74177 CT ABD & PELVIS W/CONTRAST: CPT

## 2019-09-18 RX ADMIN — IOHEXOL 100 ML: 350 INJECTION, SOLUTION INTRAVENOUS at 10:37

## 2020-01-09 ENCOUNTER — APPOINTMENT (OUTPATIENT)
Dept: LAB | Facility: CLINIC | Age: 80
End: 2020-01-09
Payer: MEDICARE

## 2020-01-09 ENCOUNTER — TRANSCRIBE ORDERS (OUTPATIENT)
Dept: LAB | Facility: CLINIC | Age: 80
End: 2020-01-09

## 2020-01-09 DIAGNOSIS — N18.30 CHRONIC RENAL DISEASE, STAGE III (HCC): ICD-10-CM

## 2020-01-09 DIAGNOSIS — E78.00 HYPERCHOLESTEREMIA: Primary | ICD-10-CM

## 2020-01-09 DIAGNOSIS — R73.01 IMPAIRED FASTING BLOOD SUGAR: ICD-10-CM

## 2020-01-09 DIAGNOSIS — E03.9 PRIMARY HYPOTHYROIDISM: ICD-10-CM

## 2020-01-09 DIAGNOSIS — E55.9 VITAMIN D DEFICIENCY DISEASE: ICD-10-CM

## 2020-01-09 DIAGNOSIS — E78.00 HYPERCHOLESTEREMIA: ICD-10-CM

## 2020-01-09 LAB
25(OH)D3 SERPL-MCNC: 87.6 NG/ML (ref 30–100)
ALBUMIN SERPL BCP-MCNC: 3.6 G/DL (ref 3.5–5)
ALP SERPL-CCNC: 69 U/L (ref 46–116)
ALT SERPL W P-5'-P-CCNC: 18 U/L (ref 12–78)
ANION GAP SERPL CALCULATED.3IONS-SCNC: 5 MMOL/L (ref 4–13)
AST SERPL W P-5'-P-CCNC: 12 U/L (ref 5–45)
BILIRUB SERPL-MCNC: 0.49 MG/DL (ref 0.2–1)
BUN SERPL-MCNC: 16 MG/DL (ref 5–25)
CALCIUM SERPL-MCNC: 9.5 MG/DL (ref 8.3–10.1)
CHLORIDE SERPL-SCNC: 106 MMOL/L (ref 100–108)
CHOLEST SERPL-MCNC: 131 MG/DL (ref 50–200)
CO2 SERPL-SCNC: 29 MMOL/L (ref 21–32)
CREAT SERPL-MCNC: 1.07 MG/DL (ref 0.6–1.3)
EST. AVERAGE GLUCOSE BLD GHB EST-MCNC: 126 MG/DL
GFR SERPL CREATININE-BSD FRML MDRD: 49 ML/MIN/1.73SQ M
GLUCOSE P FAST SERPL-MCNC: 125 MG/DL (ref 65–99)
HBA1C MFR BLD: 6 % (ref 4.2–6.3)
HDLC SERPL-MCNC: 39 MG/DL
LDLC SERPL CALC-MCNC: 62 MG/DL (ref 0–100)
NONHDLC SERPL-MCNC: 92 MG/DL
POTASSIUM SERPL-SCNC: 4 MMOL/L (ref 3.5–5.3)
PROT SERPL-MCNC: 7.4 G/DL (ref 6.4–8.2)
SODIUM SERPL-SCNC: 140 MMOL/L (ref 136–145)
TRIGL SERPL-MCNC: 152 MG/DL
TSH SERPL DL<=0.05 MIU/L-ACNC: 0.78 UIU/ML (ref 0.36–3.74)

## 2020-01-09 PROCEDURE — 36415 COLL VENOUS BLD VENIPUNCTURE: CPT

## 2020-01-09 PROCEDURE — 80061 LIPID PANEL: CPT

## 2020-01-09 PROCEDURE — 83036 HEMOGLOBIN GLYCOSYLATED A1C: CPT

## 2020-01-09 PROCEDURE — 80053 COMPREHEN METABOLIC PANEL: CPT

## 2020-01-09 PROCEDURE — 84443 ASSAY THYROID STIM HORMONE: CPT

## 2020-01-09 PROCEDURE — 82306 VITAMIN D 25 HYDROXY: CPT

## 2020-07-31 ENCOUNTER — APPOINTMENT (OUTPATIENT)
Dept: LAB | Facility: CLINIC | Age: 80
End: 2020-07-31
Payer: MEDICARE

## 2020-07-31 ENCOUNTER — TRANSCRIBE ORDERS (OUTPATIENT)
Dept: LAB | Facility: CLINIC | Age: 80
End: 2020-07-31

## 2020-07-31 DIAGNOSIS — D51.0 PERNICIOUS ANEMIA: ICD-10-CM

## 2020-07-31 DIAGNOSIS — D50.9 IRON DEFICIENCY ANEMIA, UNSPECIFIED IRON DEFICIENCY ANEMIA TYPE: ICD-10-CM

## 2020-07-31 DIAGNOSIS — R73.01 IMPAIRED FASTING BLOOD SUGAR: Primary | ICD-10-CM

## 2020-07-31 DIAGNOSIS — R73.01 IMPAIRED FASTING BLOOD SUGAR: ICD-10-CM

## 2020-07-31 LAB
ALBUMIN SERPL BCP-MCNC: 3.3 G/DL (ref 3.5–5)
ALP SERPL-CCNC: 63 U/L (ref 46–116)
ALT SERPL W P-5'-P-CCNC: 16 U/L (ref 12–78)
ANION GAP SERPL CALCULATED.3IONS-SCNC: 2 MMOL/L (ref 4–13)
AST SERPL W P-5'-P-CCNC: 10 U/L (ref 5–45)
BILIRUB SERPL-MCNC: 0.4 MG/DL (ref 0.2–1)
BUN SERPL-MCNC: 25 MG/DL (ref 5–25)
CALCIUM SERPL-MCNC: 9.2 MG/DL (ref 8.3–10.1)
CHLORIDE SERPL-SCNC: 104 MMOL/L (ref 100–108)
CO2 SERPL-SCNC: 32 MMOL/L (ref 21–32)
CREAT SERPL-MCNC: 0.97 MG/DL (ref 0.6–1.3)
ERYTHROCYTE [DISTWIDTH] IN BLOOD BY AUTOMATED COUNT: 13.4 % (ref 11.6–15.1)
FERRITIN SERPL-MCNC: 193 NG/ML (ref 8–388)
GFR SERPL CREATININE-BSD FRML MDRD: 55 ML/MIN/1.73SQ M
GLUCOSE P FAST SERPL-MCNC: 98 MG/DL (ref 65–99)
HCT VFR BLD AUTO: 37.9 % (ref 34.8–46.1)
HGB BLD-MCNC: 11.6 G/DL (ref 11.5–15.4)
IRON SATN MFR SERPL: 29 %
IRON SERPL-MCNC: 81 UG/DL (ref 50–170)
MCH RBC QN AUTO: 29.7 PG (ref 26.8–34.3)
MCHC RBC AUTO-ENTMCNC: 30.6 G/DL (ref 31.4–37.4)
MCV RBC AUTO: 97 FL (ref 82–98)
PLATELET # BLD AUTO: 264 THOUSANDS/UL (ref 149–390)
PMV BLD AUTO: 10.6 FL (ref 8.9–12.7)
POTASSIUM SERPL-SCNC: 4.2 MMOL/L (ref 3.5–5.3)
PROT SERPL-MCNC: 7.2 G/DL (ref 6.4–8.2)
RBC # BLD AUTO: 3.91 MILLION/UL (ref 3.81–5.12)
SODIUM SERPL-SCNC: 138 MMOL/L (ref 136–145)
TIBC SERPL-MCNC: 275 UG/DL (ref 250–450)
TSH SERPL DL<=0.05 MIU/L-ACNC: 0.33 UIU/ML (ref 0.36–3.74)
VIT B12 SERPL-MCNC: 261 PG/ML (ref 100–900)
WBC # BLD AUTO: 11.58 THOUSAND/UL (ref 4.31–10.16)

## 2020-07-31 PROCEDURE — 83540 ASSAY OF IRON: CPT

## 2020-07-31 PROCEDURE — 82607 VITAMIN B-12: CPT

## 2020-07-31 PROCEDURE — 83036 HEMOGLOBIN GLYCOSYLATED A1C: CPT

## 2020-07-31 PROCEDURE — 82728 ASSAY OF FERRITIN: CPT

## 2020-07-31 PROCEDURE — 80053 COMPREHEN METABOLIC PANEL: CPT

## 2020-07-31 PROCEDURE — 83550 IRON BINDING TEST: CPT

## 2020-07-31 PROCEDURE — 84443 ASSAY THYROID STIM HORMONE: CPT

## 2020-07-31 PROCEDURE — 36415 COLL VENOUS BLD VENIPUNCTURE: CPT

## 2020-07-31 PROCEDURE — 85027 COMPLETE CBC AUTOMATED: CPT

## 2020-08-01 LAB
EST. AVERAGE GLUCOSE BLD GHB EST-MCNC: 128 MG/DL
HBA1C MFR BLD: 6.1 %

## 2020-09-30 ENCOUNTER — TRANSCRIBE ORDERS (OUTPATIENT)
Dept: LAB | Facility: CLINIC | Age: 80
End: 2020-09-30

## 2020-09-30 ENCOUNTER — APPOINTMENT (OUTPATIENT)
Dept: LAB | Facility: CLINIC | Age: 80
End: 2020-09-30
Payer: MEDICARE

## 2020-09-30 DIAGNOSIS — Z79.899 ENCOUNTER FOR LONG-TERM (CURRENT) USE OF MEDICATIONS: ICD-10-CM

## 2020-09-30 DIAGNOSIS — E03.9 PRIMARY HYPOTHYROIDISM: ICD-10-CM

## 2020-09-30 DIAGNOSIS — I10 ESSENTIAL HYPERTENSION, MALIGNANT: ICD-10-CM

## 2020-09-30 DIAGNOSIS — E78.00 HYPERCHOLESTEROLEMIA: ICD-10-CM

## 2020-09-30 DIAGNOSIS — E03.9 PRIMARY HYPOTHYROIDISM: Primary | ICD-10-CM

## 2020-09-30 LAB
ALBUMIN SERPL BCP-MCNC: 3.7 G/DL (ref 3.5–5)
ALP SERPL-CCNC: 64 U/L (ref 46–116)
ALT SERPL W P-5'-P-CCNC: 16 U/L (ref 12–78)
AST SERPL W P-5'-P-CCNC: 11 U/L (ref 5–45)
BILIRUB DIRECT SERPL-MCNC: 0.1 MG/DL (ref 0–0.2)
BILIRUB SERPL-MCNC: 0.34 MG/DL (ref 0.2–1)
CHOLEST SERPL-MCNC: 179 MG/DL (ref 50–200)
CK SERPL-CCNC: 76 U/L (ref 26–192)
HDLC SERPL-MCNC: 45 MG/DL
LDLC SERPL CALC-MCNC: 75 MG/DL (ref 0–100)
NONHDLC SERPL-MCNC: 134 MG/DL
PROT SERPL-MCNC: 7.3 G/DL (ref 6.4–8.2)
TRIGL SERPL-MCNC: 295 MG/DL
TSH SERPL DL<=0.05 MIU/L-ACNC: 1.25 UIU/ML (ref 0.36–3.74)

## 2020-09-30 PROCEDURE — 82550 ASSAY OF CK (CPK): CPT

## 2020-09-30 PROCEDURE — 36415 COLL VENOUS BLD VENIPUNCTURE: CPT

## 2020-09-30 PROCEDURE — 80061 LIPID PANEL: CPT

## 2020-09-30 PROCEDURE — 80076 HEPATIC FUNCTION PANEL: CPT

## 2020-09-30 PROCEDURE — 84443 ASSAY THYROID STIM HORMONE: CPT

## 2020-11-16 ENCOUNTER — OFFICE VISIT (OUTPATIENT)
Dept: PODIATRY | Facility: CLINIC | Age: 80
End: 2020-11-16
Payer: MEDICARE

## 2020-11-16 VITALS
RESPIRATION RATE: 16 BRPM | DIASTOLIC BLOOD PRESSURE: 76 MMHG | HEART RATE: 71 BPM | HEIGHT: 63 IN | WEIGHT: 175 LBS | BODY MASS INDEX: 31.01 KG/M2 | SYSTOLIC BLOOD PRESSURE: 139 MMHG

## 2020-11-16 DIAGNOSIS — M20.41 HAMMER TOES OF BOTH FEET: ICD-10-CM

## 2020-11-16 DIAGNOSIS — M25.571 ARTHRALGIA OF FOOT, RIGHT: ICD-10-CM

## 2020-11-16 DIAGNOSIS — L03.032 PARONYCHIA OF GREAT TOE OF LEFT FOOT: Primary | ICD-10-CM

## 2020-11-16 DIAGNOSIS — I73.9 PERIPHERAL VASCULAR DISEASE, UNSPECIFIED (HCC): ICD-10-CM

## 2020-11-16 DIAGNOSIS — M79.671 PAIN IN BOTH FEET: ICD-10-CM

## 2020-11-16 DIAGNOSIS — M79.672 PAIN IN BOTH FEET: ICD-10-CM

## 2020-11-16 DIAGNOSIS — M20.42 HAMMER TOES OF BOTH FEET: ICD-10-CM

## 2020-11-16 PROCEDURE — 99213 OFFICE O/P EST LOW 20 MIN: CPT | Performed by: PODIATRIST

## 2020-11-19 ENCOUNTER — HOSPITAL ENCOUNTER (OUTPATIENT)
Dept: RADIOLOGY | Facility: HOSPITAL | Age: 80
Discharge: HOME | End: 2020-11-19
Attending: PODIATRIST
Payer: MEDICARE

## 2020-11-19 ENCOUNTER — TRANSCRIBE ORDERS (OUTPATIENT)
Dept: ADMINISTRATIVE | Facility: HOSPITAL | Age: 80
End: 2020-11-19

## 2020-11-19 DIAGNOSIS — M25.571 ARTHRALGIA OF FOOT, RIGHT: ICD-10-CM

## 2020-11-19 DIAGNOSIS — M25.571 ARTHRALGIA OF FOOT, RIGHT: Primary | ICD-10-CM

## 2020-11-19 PROCEDURE — 73630 X-RAY EXAM OF FOOT: CPT

## 2020-11-30 ENCOUNTER — OFFICE VISIT (OUTPATIENT)
Dept: PODIATRY | Facility: CLINIC | Age: 80
End: 2020-11-30
Payer: MEDICARE

## 2020-11-30 VITALS
SYSTOLIC BLOOD PRESSURE: 124 MMHG | DIASTOLIC BLOOD PRESSURE: 69 MMHG | HEART RATE: 58 BPM | BODY MASS INDEX: 31.01 KG/M2 | WEIGHT: 175 LBS | RESPIRATION RATE: 16 BRPM | HEIGHT: 63 IN

## 2020-11-30 DIAGNOSIS — R23.8 SKIN IRRITATION: Primary | ICD-10-CM

## 2020-11-30 DIAGNOSIS — M79.672 PAIN IN BOTH FEET: ICD-10-CM

## 2020-11-30 DIAGNOSIS — L03.032 PARONYCHIA OF GREAT TOE OF LEFT FOOT: ICD-10-CM

## 2020-11-30 DIAGNOSIS — I73.9 PERIPHERAL VASCULAR DISEASE, UNSPECIFIED (HCC): ICD-10-CM

## 2020-11-30 DIAGNOSIS — M25.571 ARTHRALGIA OF FOOT, RIGHT: ICD-10-CM

## 2020-11-30 DIAGNOSIS — M79.671 PAIN IN BOTH FEET: ICD-10-CM

## 2020-11-30 PROCEDURE — 99213 OFFICE O/P EST LOW 20 MIN: CPT | Performed by: PODIATRIST

## 2020-11-30 RX ORDER — AMMONIUM LACTATE 12 G/100G
CREAM TOPICAL AS NEEDED
Qty: 385 G | Refills: 0 | Status: SHIPPED | OUTPATIENT
Start: 2020-11-30

## 2021-03-11 ENCOUNTER — TRANSCRIBE ORDERS (OUTPATIENT)
Dept: ADMINISTRATIVE | Facility: HOSPITAL | Age: 81
End: 2021-03-11

## 2021-03-11 DIAGNOSIS — Z12.31 ENCOUNTER FOR SCREENING MAMMOGRAM FOR MALIGNANT NEOPLASM OF BREAST: Primary | ICD-10-CM

## 2021-03-11 DIAGNOSIS — Z13.820 SCREENING FOR OSTEOPOROSIS: Primary | ICD-10-CM

## 2021-03-18 ENCOUNTER — HOSPITAL ENCOUNTER (OUTPATIENT)
Dept: RADIOLOGY | Facility: HOSPITAL | Age: 81
Discharge: HOME | End: 2021-03-18
Attending: FAMILY MEDICINE
Payer: MEDICARE

## 2021-03-18 DIAGNOSIS — Z13.820 SCREENING FOR OSTEOPOROSIS: ICD-10-CM

## 2021-03-18 PROCEDURE — 77080 DXA BONE DENSITY AXIAL: CPT

## 2021-03-25 ENCOUNTER — APPOINTMENT (OUTPATIENT)
Dept: LAB | Facility: CLINIC | Age: 81
End: 2021-03-25
Payer: MEDICARE

## 2021-03-25 ENCOUNTER — TRANSCRIBE ORDERS (OUTPATIENT)
Dept: LAB | Facility: CLINIC | Age: 81
End: 2021-03-25

## 2021-03-25 DIAGNOSIS — E78.00 PURE HYPERCHOLESTEROLEMIA: Primary | ICD-10-CM

## 2021-03-25 DIAGNOSIS — E11.9 DIABETES MELLITUS WITHOUT COMPLICATION (HCC): ICD-10-CM

## 2021-03-25 DIAGNOSIS — N18.31 CHRONIC KIDNEY DISEASE (CKD) STAGE G3A/A1, MODERATELY DECREASED GLOMERULAR FILTRATION RATE (GFR) BETWEEN 45-59 ML/MIN/1.73 SQUARE METER AND ALBUMINURIA CREATININE RATIO LESS THAN 30 MG/G (HCC): ICD-10-CM

## 2021-03-25 DIAGNOSIS — E55.9 AVITAMINOSIS D: ICD-10-CM

## 2021-03-25 LAB
25(OH)D3 SERPL-MCNC: 31 NG/ML (ref 30–100)
ALBUMIN SERPL BCP-MCNC: 3.7 G/DL (ref 3.5–5)
ALP SERPL-CCNC: 73 U/L (ref 46–116)
ALT SERPL W P-5'-P-CCNC: 18 U/L (ref 12–78)
ANION GAP SERPL CALCULATED.3IONS-SCNC: 8 MMOL/L (ref 4–13)
AST SERPL W P-5'-P-CCNC: 17 U/L (ref 5–45)
BILIRUB SERPL-MCNC: 0.3 MG/DL (ref 0.2–1)
BUN SERPL-MCNC: 18 MG/DL (ref 5–25)
CALCIUM SERPL-MCNC: 9.3 MG/DL (ref 8.3–10.1)
CHLORIDE SERPL-SCNC: 105 MMOL/L (ref 100–108)
CHOLEST SERPL-MCNC: 199 MG/DL (ref 50–200)
CO2 SERPL-SCNC: 25 MMOL/L (ref 21–32)
CREAT SERPL-MCNC: 1.03 MG/DL (ref 0.6–1.3)
EST. AVERAGE GLUCOSE BLD GHB EST-MCNC: 140 MG/DL
GFR SERPL CREATININE-BSD FRML MDRD: 51 ML/MIN/1.73SQ M
GLUCOSE P FAST SERPL-MCNC: 124 MG/DL (ref 65–99)
HBA1C MFR BLD: 6.5 %
HDLC SERPL-MCNC: 38 MG/DL
LDLC SERPL CALC-MCNC: 90 MG/DL (ref 0–100)
NONHDLC SERPL-MCNC: 161 MG/DL
POTASSIUM SERPL-SCNC: 4.1 MMOL/L (ref 3.5–5.3)
PROT SERPL-MCNC: 7.4 G/DL (ref 6.4–8.2)
SODIUM SERPL-SCNC: 138 MMOL/L (ref 136–145)
TRIGL SERPL-MCNC: 357 MG/DL

## 2021-03-25 PROCEDURE — 80053 COMPREHEN METABOLIC PANEL: CPT

## 2021-03-25 PROCEDURE — 82306 VITAMIN D 25 HYDROXY: CPT

## 2021-03-25 PROCEDURE — 83036 HEMOGLOBIN GLYCOSYLATED A1C: CPT

## 2021-03-25 PROCEDURE — 80061 LIPID PANEL: CPT

## 2021-03-25 PROCEDURE — 36415 COLL VENOUS BLD VENIPUNCTURE: CPT

## 2021-04-22 ENCOUNTER — HOSPITAL ENCOUNTER (OUTPATIENT)
Dept: RADIOLOGY | Facility: HOSPITAL | Age: 81
Discharge: HOME | End: 2021-04-22
Attending: FAMILY MEDICINE
Payer: MEDICARE

## 2021-04-22 VITALS — WEIGHT: 180 LBS | HEIGHT: 63 IN | BODY MASS INDEX: 31.89 KG/M2

## 2021-04-22 DIAGNOSIS — Z12.31 ENCOUNTER FOR SCREENING MAMMOGRAM FOR MALIGNANT NEOPLASM OF BREAST: ICD-10-CM

## 2021-04-22 PROCEDURE — 77067 SCR MAMMO BI INCL CAD: CPT

## 2021-04-22 PROCEDURE — 77063 BREAST TOMOSYNTHESIS BI: CPT

## 2021-06-17 ENCOUNTER — APPOINTMENT (OUTPATIENT)
Dept: LAB | Facility: CLINIC | Age: 81
End: 2021-06-17
Payer: MEDICARE

## 2021-08-03 ENCOUNTER — IMMUNIZATIONS (OUTPATIENT)
Dept: FAMILY MEDICINE CLINIC | Facility: HOSPITAL | Age: 81
End: 2021-08-03

## 2021-08-03 DIAGNOSIS — Z23 ENCOUNTER FOR IMMUNIZATION: Primary | ICD-10-CM

## 2021-08-03 PROCEDURE — 91301 SARS-COV-2 / COVID-19 MRNA VACCINE (MODERNA) 100 MCG: CPT

## 2021-08-03 PROCEDURE — 0011A SARS-COV-2 / COVID-19 MRNA VACCINE (MODERNA) 100 MCG: CPT

## 2021-08-31 ENCOUNTER — IMMUNIZATIONS (OUTPATIENT)
Dept: FAMILY MEDICINE CLINIC | Facility: HOSPITAL | Age: 81
End: 2021-08-31

## 2021-08-31 DIAGNOSIS — Z23 ENCOUNTER FOR IMMUNIZATION: Primary | ICD-10-CM

## 2021-08-31 PROCEDURE — 0012A SARS-COV-2 / COVID-19 MRNA VACCINE (MODERNA) 100 MCG: CPT

## 2021-08-31 PROCEDURE — 91301 SARS-COV-2 / COVID-19 MRNA VACCINE (MODERNA) 100 MCG: CPT

## 2021-10-27 ENCOUNTER — APPOINTMENT (OUTPATIENT)
Dept: LAB | Facility: CLINIC | Age: 81
End: 2021-10-27
Payer: MEDICARE

## 2022-02-25 ENCOUNTER — APPOINTMENT (OUTPATIENT)
Dept: LAB | Facility: CLINIC | Age: 82
End: 2022-02-25
Payer: MEDICARE

## 2022-07-21 ENCOUNTER — TRANSCRIBE ORDERS (OUTPATIENT)
Dept: LAB | Facility: CLINIC | Age: 82
End: 2022-07-21

## 2022-07-21 ENCOUNTER — APPOINTMENT (OUTPATIENT)
Dept: LAB | Facility: CLINIC | Age: 82
End: 2022-07-21
Payer: MEDICARE

## 2022-07-21 DIAGNOSIS — R35.0 URINARY FREQUENCY: Primary | ICD-10-CM

## 2022-07-21 LAB
25(OH)D3 SERPL-MCNC: 34.4 NG/ML (ref 30–100)
ALBUMIN SERPL BCP-MCNC: 3.6 G/DL (ref 3.5–5)
ALP SERPL-CCNC: 71 U/L (ref 46–116)
ALT SERPL W P-5'-P-CCNC: 14 U/L (ref 12–78)
AMYLASE SERPL-CCNC: 54 IU/L (ref 25–115)
ANION GAP SERPL CALCULATED.3IONS-SCNC: 5 MMOL/L (ref 4–13)
AST SERPL W P-5'-P-CCNC: 15 U/L (ref 5–45)
BACTERIA UR QL AUTO: ABNORMAL /HPF
BILIRUB SERPL-MCNC: 0.39 MG/DL (ref 0.2–1)
BILIRUB UR QL STRIP: NEGATIVE
BUN SERPL-MCNC: 20 MG/DL (ref 5–25)
CALCIUM SERPL-MCNC: 9.5 MG/DL (ref 8.3–10.1)
CHLORIDE SERPL-SCNC: 105 MMOL/L (ref 96–108)
CHOLEST SERPL-MCNC: 196 MG/DL
CLARITY UR: ABNORMAL
CO2 SERPL-SCNC: 29 MMOL/L (ref 21–32)
COLOR UR: ABNORMAL
CREAT SERPL-MCNC: 1.05 MG/DL (ref 0.6–1.3)
GFR SERPL CREATININE-BSD FRML MDRD: 49 ML/MIN/1.73SQ M
GLUCOSE P FAST SERPL-MCNC: 122 MG/DL (ref 65–99)
GLUCOSE UR STRIP-MCNC: NEGATIVE MG/DL
HDLC SERPL-MCNC: 43 MG/DL
HGB UR QL STRIP.AUTO: ABNORMAL
KETONES UR STRIP-MCNC: NEGATIVE MG/DL
LDLC SERPL CALC-MCNC: 93 MG/DL (ref 0–100)
LEUKOCYTE ESTERASE UR QL STRIP: ABNORMAL
LIPASE SERPL-CCNC: 168 U/L (ref 73–393)
NITRITE UR QL STRIP: NEGATIVE
NON-SQ EPI CELLS URNS QL MICRO: ABNORMAL /HPF
NONHDLC SERPL-MCNC: 153 MG/DL
PH UR STRIP.AUTO: 6 [PH]
POTASSIUM SERPL-SCNC: 4.2 MMOL/L (ref 3.5–5.3)
PROT SERPL-MCNC: 7.7 G/DL (ref 6.4–8.4)
PROT UR STRIP-MCNC: NEGATIVE MG/DL
RBC #/AREA URNS AUTO: ABNORMAL /HPF
SODIUM SERPL-SCNC: 139 MMOL/L (ref 135–147)
SP GR UR STRIP.AUTO: 1.01 (ref 1–1.03)
TRIGL SERPL-MCNC: 300 MG/DL
TSH SERPL DL<=0.05 MIU/L-ACNC: 1.19 UIU/ML (ref 0.45–4.5)
UROBILINOGEN UR STRIP-ACNC: <2 MG/DL
VIT B12 SERPL-MCNC: 341 PG/ML (ref 100–900)
WBC #/AREA URNS AUTO: ABNORMAL /HPF
WBC CLUMPS # UR AUTO: PRESENT /UL

## 2022-07-21 PROCEDURE — 82150 ASSAY OF AMYLASE: CPT

## 2022-07-21 PROCEDURE — 83690 ASSAY OF LIPASE: CPT

## 2022-07-21 PROCEDURE — 81001 URINALYSIS AUTO W/SCOPE: CPT

## 2022-07-21 PROCEDURE — 80061 LIPID PANEL: CPT

## 2022-07-21 PROCEDURE — 84443 ASSAY THYROID STIM HORMONE: CPT

## 2022-07-21 PROCEDURE — 87086 URINE CULTURE/COLONY COUNT: CPT

## 2022-07-21 PROCEDURE — 87077 CULTURE AEROBIC IDENTIFY: CPT

## 2022-07-21 PROCEDURE — 87186 SC STD MICRODIL/AGAR DIL: CPT

## 2022-07-21 PROCEDURE — 82607 VITAMIN B-12: CPT

## 2022-07-21 PROCEDURE — 82306 VITAMIN D 25 HYDROXY: CPT

## 2022-07-21 PROCEDURE — 36415 COLL VENOUS BLD VENIPUNCTURE: CPT

## 2022-07-21 PROCEDURE — 80053 COMPREHEN METABOLIC PANEL: CPT

## 2022-07-23 LAB — BACTERIA UR CULT: ABNORMAL

## 2022-07-27 RX ORDER — HYDROCHLOROTHIAZIDE 12.5 MG/1
12.5 TABLET ORAL DAILY
COMMUNITY

## 2022-07-27 RX ORDER — CEPHALEXIN 250 MG/1
CAPSULE ORAL 4 TIMES DAILY
COMMUNITY
Start: 2022-07-26 | End: 2022-08-04

## 2022-07-27 RX ORDER — NICOTINE 14MG/24HR
PATCH, TRANSDERMAL 24 HOURS TRANSDERMAL
COMMUNITY

## 2022-07-27 RX ORDER — VALSARTAN 160 MG/1
160 TABLET ORAL DAILY
COMMUNITY

## 2022-07-27 RX ORDER — METOPROLOL SUCCINATE 25 MG/1
25 TABLET, EXTENDED RELEASE ORAL DAILY
COMMUNITY

## 2022-07-27 NOTE — PRE-PROCEDURE INSTRUCTIONS
Pre-Surgery Instructions:   Medication Instructions    ammonium lactate (LAC-HYDRIN) 12 % cream Uses PRN- OK to take day of surgery    Biotin 2500 MCG CAPS Hold day of surgery   cephalexin (KEFLEX) 250 mg capsule Hold day of surgery   Cholecalciferol (VITAMIN D-3 PO) Hold day of surgery   diclofenac sodium (VOLTAREN) 1 % Uses PRN- OK to take day of surgery    hydrochlorothiazide (HYDRODIURIL) 12 5 mg tablet Hold day of surgery   levothyroxine 100 mcg tablet Take day of surgery   metoprolol succinate (TOPROL-XL) 25 mg 24 hr tablet Take day of surgery   Omega-3 Fatty Acids (FISH OIL) 1200 MG CAPS Hold day of surgery   ondansetron (ZOFRAN) 4 mg tablet Hold day of surgery   Saccharomyces boulardii (Probiotic) 250 MG CAPS Hold day of surgery   simvastatin (ZOCOR) 20 mg tablet Take night before surgery    valsartan (DIOVAN) 160 mg tablet Hold day of surgery  Pre op instructions reviewed with pt via phone  Instructed to take metoprolol and synthroid a m of surgery   daughter Jodie Babinski to provide mobile # on arrival  My Surgical Experience    The following information was developed to assist you to prepare for your operation  What do I need to do before coming to the hospital?   Arrange for a responsible person to drive you to and from the hospital    Arrange care for your children at home  Children are not allowed in the recovery areas of the hospital   Plan to wear clothing that is easy to put on and take off  If you are having shoulder surgery, wear a shirt that buttons or zippers in the front  Bathing  o Shower the evening before and the morning of your surgery with an antibacterial soap   Please refer to the Pre Op Showering Instructions for Surgery Patients Sheet   o Remove nail polish and all body piercing jewelry  o Do not shave any body part for at least 24 hours before surgery-this includes face, arms, legs and upper body  Food  o Nothing to eat or drink after midnight the night before your surgery  This includes candy and chewing gum  o Exception: If your surgery is after 12:00pm (noon), you may have clear liquids such as 7-Up®, ginger ale, apple or cranberry juice, Jell-O®, water, or clear broth until 8:00 am  o Do not drink milk or juice with pulp on the morning before surgery  o Do not drink alcohol 24 hours before surgery  Medicine  o Follow instructions you received from your surgeon about which medicines you may take on the day of surgery  o If instructed to take medicine on the morning of surgery, take pills with just a small sip of water  Call your prescribing doctor for specific information on what to do if you take insulin    What should I bring to the hospital?    Bring:  Jorge Hun or a walker, if you have them, for foot or knee surgery   A list of the daily medicines, vitamins, minerals, herbals and nutritional supplements you take  Include the dosages of medicines and the time you take them each day   Glasses, dentures or hearing aids   Minimal clothing; you will be wearing hospital sleepwear   Photo ID; required to verify your identity   If you have a Living Will or Power of , bring a copy of the documents   If you have an ostomy, bring an extra pouch and any supplies you use    Do not bring   Medicines or inhalers   Money, valuables or jewelry    What other information should I know about the day of surgery?  Notify your surgeons if you develop a cold, sore throat, cough, fever, rash or any other illness   Report to the Ambulatory Surgical/Same Day Surgery Unit   You will be instructed to stop at Registration only if you have not been pre-registered   Inform your  fi they do not stay that they will be asked by the staff to leave a phone number where they can be reached   Be available to be reached before surgery   In the event the operating room schedule changes, you may be asked to come in earlier or later than expected    *It is important to tell your doctor and others involved in your health care if you are taking or have been taking any non-prescription drugs, vitamins, minerals, herbals or other nutritional supplements   Any of these may interact with some food or medicines and cause a reaction

## 2022-07-29 ENCOUNTER — HOSPITAL ENCOUNTER (OUTPATIENT)
Dept: RADIOLOGY | Facility: HOSPITAL | Age: 82
Discharge: HOME | End: 2022-07-29
Payer: MEDICARE

## 2022-07-29 ENCOUNTER — HOSPITAL ENCOUNTER (OUTPATIENT)
Dept: RADIOLOGY | Facility: HOSPITAL | Age: 82
Discharge: HOME | End: 2022-07-29
Attending: FAMILY MEDICINE
Payer: MEDICARE

## 2022-07-29 VITALS — BODY MASS INDEX: 31.89 KG/M2 | WEIGHT: 180 LBS | HEIGHT: 63 IN

## 2022-07-29 DIAGNOSIS — N64.4 BREAST PAIN, RIGHT: ICD-10-CM

## 2022-07-29 PROCEDURE — G0279 TOMOSYNTHESIS, MAMMO: HCPCS

## 2022-07-29 PROCEDURE — 77066 DX MAMMO INCL CAD BI: CPT

## 2022-07-31 ENCOUNTER — ANESTHESIA EVENT (OUTPATIENT)
Dept: PERIOP | Facility: AMBULARY SURGERY CENTER | Age: 82
End: 2022-07-31
Payer: MEDICARE

## 2022-08-01 ENCOUNTER — HOSPITAL ENCOUNTER (OUTPATIENT)
Facility: AMBULARY SURGERY CENTER | Age: 82
Setting detail: OUTPATIENT SURGERY
Discharge: HOME | End: 2022-08-01
Attending: OPHTHALMOLOGY | Admitting: OPHTHALMOLOGY
Payer: MEDICARE

## 2022-08-01 ENCOUNTER — ANESTHESIA (OUTPATIENT)
Dept: PERIOP | Facility: AMBULARY SURGERY CENTER | Age: 82
End: 2022-08-01
Payer: MEDICARE

## 2022-08-01 VITALS
HEIGHT: 63 IN | TEMPERATURE: 97.4 F | SYSTOLIC BLOOD PRESSURE: 135 MMHG | BODY MASS INDEX: 31.89 KG/M2 | RESPIRATION RATE: 18 BRPM | WEIGHT: 180 LBS | OXYGEN SATURATION: 97 % | DIASTOLIC BLOOD PRESSURE: 78 MMHG | HEART RATE: 63 BPM

## 2022-08-01 DIAGNOSIS — H25.811 COMBINED FORMS OF AGE-RELATED CATARACT OF RIGHT EYE: Primary | ICD-10-CM

## 2022-08-01 PROBLEM — I10 HTN (HYPERTENSION): Status: ACTIVE | Noted: 2022-08-01

## 2022-08-01 PROBLEM — I25.10 CAD (CORONARY ARTERY DISEASE): Status: ACTIVE | Noted: 2022-08-01

## 2022-08-01 PROBLEM — E78.5 HYPERLIPIDEMIA: Status: ACTIVE | Noted: 2022-08-01

## 2022-08-01 PROBLEM — E03.9 HYPOTHYROIDISM: Status: ACTIVE | Noted: 2022-08-01

## 2022-08-01 PROBLEM — I21.9 MI (MYOCARDIAL INFARCTION) (HCC): Status: ACTIVE | Noted: 2022-08-01

## 2022-08-01 PROCEDURE — V2632 POST CHMBR INTRAOCULAR LENS: HCPCS | Performed by: OPHTHALMOLOGY

## 2022-08-01 DEVICE — ACRYSOF(R) IQ ASPHERIC NATURAL IOL, SINGLE-PIECE ACRYLIC FOLDABLE PCL, UV WITH BLUE LIGHTFILTER, 13.0MM LENGTH, 6.0MM ANTERIORASYMMETRIC BICONVEX OPTIC, PLANAR HAPTICS.
Type: IMPLANTABLE DEVICE | Site: EYE | Status: FUNCTIONAL
Brand: ACRYSOF®

## 2022-08-01 RX ORDER — TETRACAINE HYDROCHLORIDE 5 MG/ML
1 SOLUTION OPHTHALMIC ONCE
Status: COMPLETED | OUTPATIENT
Start: 2022-08-01 | End: 2022-08-01

## 2022-08-01 RX ORDER — LIDOCAINE HYDROCHLORIDE 10 MG/ML
INJECTION, SOLUTION EPIDURAL; INFILTRATION; INTRACAUDAL; PERINEURAL AS NEEDED
Status: DISCONTINUED | OUTPATIENT
Start: 2022-08-01 | End: 2022-08-01 | Stop reason: HOSPADM

## 2022-08-01 RX ORDER — TETRACAINE HYDROCHLORIDE 5 MG/ML
SOLUTION OPHTHALMIC AS NEEDED
Status: DISCONTINUED | OUTPATIENT
Start: 2022-08-01 | End: 2022-08-01 | Stop reason: HOSPADM

## 2022-08-01 RX ORDER — BALANCED SALT SOLUTION 6.4; .75; .48; .3; 3.9; 1.7 MG/ML; MG/ML; MG/ML; MG/ML; MG/ML; MG/ML
SOLUTION OPHTHALMIC AS NEEDED
Status: DISCONTINUED | OUTPATIENT
Start: 2022-08-01 | End: 2022-08-01 | Stop reason: HOSPADM

## 2022-08-01 RX ORDER — SODIUM CHLORIDE, SODIUM LACTATE, POTASSIUM CHLORIDE, CALCIUM CHLORIDE 600; 310; 30; 20 MG/100ML; MG/100ML; MG/100ML; MG/100ML
INJECTION, SOLUTION INTRAVENOUS CONTINUOUS PRN
Status: DISCONTINUED | OUTPATIENT
Start: 2022-08-01 | End: 2022-08-01

## 2022-08-01 RX ORDER — PROPOFOL 10 MG/ML
INJECTION, EMULSION INTRAVENOUS AS NEEDED
Status: DISCONTINUED | OUTPATIENT
Start: 2022-08-01 | End: 2022-08-01

## 2022-08-01 RX ORDER — GATIFLOXACIN 5 MG/ML
1 SOLUTION/ DROPS OPHTHALMIC 2 TIMES DAILY
Qty: 3 ML | Refills: 0
Start: 2022-08-01 | End: 2022-10-11

## 2022-08-01 RX ORDER — MIDAZOLAM HYDROCHLORIDE 2 MG/2ML
INJECTION, SOLUTION INTRAMUSCULAR; INTRAVENOUS AS NEEDED
Status: DISCONTINUED | OUTPATIENT
Start: 2022-08-01 | End: 2022-08-01

## 2022-08-01 RX ORDER — PHENYLEPHRINE HCL 2.5 %
1 DROPS OPHTHALMIC (EYE)
Status: COMPLETED | OUTPATIENT
Start: 2022-08-01 | End: 2022-08-01

## 2022-08-01 RX ORDER — CYCLOPENTOLATE HYDROCHLORIDE 10 MG/ML
1 SOLUTION/ DROPS OPHTHALMIC
Status: COMPLETED | OUTPATIENT
Start: 2022-08-01 | End: 2022-08-01

## 2022-08-01 RX ORDER — LIDOCAINE HYDROCHLORIDE 20 MG/ML
1 JELLY TOPICAL
Status: COMPLETED | OUTPATIENT
Start: 2022-08-01 | End: 2022-08-01

## 2022-08-01 RX ORDER — KETOROLAC TROMETHAMINE 5 MG/ML
1 SOLUTION OPHTHALMIC
Status: COMPLETED | OUTPATIENT
Start: 2022-08-01 | End: 2022-08-01

## 2022-08-01 RX ORDER — KETOROLAC TROMETHAMINE 5 MG/ML
1 SOLUTION OPHTHALMIC 4 TIMES DAILY
Qty: 5 ML | Refills: 0
Start: 2022-08-01 | End: 2022-10-11

## 2022-08-01 RX ORDER — GATIFLOXACIN 5 MG/ML
SOLUTION/ DROPS OPHTHALMIC AS NEEDED
Status: DISCONTINUED | OUTPATIENT
Start: 2022-08-01 | End: 2022-08-01 | Stop reason: HOSPADM

## 2022-08-01 RX ADMIN — KETOROLAC TROMETHAMINE 1 DROP: 5 SOLUTION OPHTHALMIC at 13:05

## 2022-08-01 RX ADMIN — PROPOFOL 20 MG: 10 INJECTION, EMULSION INTRAVENOUS at 13:22

## 2022-08-01 RX ADMIN — KETOROLAC TROMETHAMINE 1 DROP: 5 SOLUTION OPHTHALMIC at 12:20

## 2022-08-01 RX ADMIN — CYCLOPENTOLATE HYDROCHLORIDE 1 DROP: 10 SOLUTION OPHTHALMIC at 12:50

## 2022-08-01 RX ADMIN — TETRACAINE HYDROCHLORIDE 1 DROP: 5 SOLUTION OPHTHALMIC at 12:20

## 2022-08-01 RX ADMIN — MIDAZOLAM 2 MG: 1 INJECTION INTRAMUSCULAR; INTRAVENOUS at 13:11

## 2022-08-01 RX ADMIN — LIDOCAINE HYDROCHLORIDE 1 APPLICATION: 20 JELLY TOPICAL at 12:50

## 2022-08-01 RX ADMIN — LIDOCAINE HYDROCHLORIDE 1 APPLICATION: 20 JELLY TOPICAL at 12:20

## 2022-08-01 RX ADMIN — KETOROLAC TROMETHAMINE 1 DROP: 5 SOLUTION OPHTHALMIC at 12:35

## 2022-08-01 RX ADMIN — CYCLOPENTOLATE HYDROCHLORIDE 1 DROP: 10 SOLUTION OPHTHALMIC at 13:05

## 2022-08-01 RX ADMIN — KETOROLAC TROMETHAMINE 1 DROP: 5 SOLUTION OPHTHALMIC at 12:50

## 2022-08-01 RX ADMIN — PHENYLEPHRINE HYDROCHLORIDE 1 DROP: 25 SOLUTION/ DROPS OPHTHALMIC at 12:50

## 2022-08-01 RX ADMIN — PHENYLEPHRINE HYDROCHLORIDE 1 DROP: 25 SOLUTION/ DROPS OPHTHALMIC at 12:20

## 2022-08-01 RX ADMIN — PHENYLEPHRINE HYDROCHLORIDE 1 DROP: 25 SOLUTION/ DROPS OPHTHALMIC at 12:35

## 2022-08-01 RX ADMIN — CYCLOPENTOLATE HYDROCHLORIDE 1 DROP: 10 SOLUTION OPHTHALMIC at 12:20

## 2022-08-01 RX ADMIN — LIDOCAINE HYDROCHLORIDE 1 APPLICATION: 20 JELLY TOPICAL at 12:35

## 2022-08-01 RX ADMIN — SODIUM CHLORIDE, SODIUM LACTATE, POTASSIUM CHLORIDE, AND CALCIUM CHLORIDE: .6; .31; .03; .02 INJECTION, SOLUTION INTRAVENOUS at 13:05

## 2022-08-01 RX ADMIN — PHENYLEPHRINE HYDROCHLORIDE 1 DROP: 25 SOLUTION/ DROPS OPHTHALMIC at 13:05

## 2022-08-01 RX ADMIN — CYCLOPENTOLATE HYDROCHLORIDE 1 DROP: 10 SOLUTION OPHTHALMIC at 12:35

## 2022-08-01 NOTE — PERIOPERATIVE NURSING NOTE
Patient received into Pacu via stretcher fromOR, patient reactive, assisted into chair, denies pain  Eye shield intact over  right   eye, no drainage or active bleeding noted from  right  eye  Patient given snack and tolerating well

## 2022-08-01 NOTE — ANESTHESIA PREPROCEDURE EVALUATION
Procedure:  EXTRACTION EXTRACAPSULAR CATARACT PHACO INTRAOCULAR LENS (IOL) (Right Eye)    Relevant Problems   CARDIO   (+) CAD (coronary artery disease)   (+) HTN (hypertension)   (+) Hyperlipidemia   (+) MI (myocardial infarction) (HCC)      ENDO   (+) Hypothyroidism        Physical Exam    Airway    Mallampati score: II  TM Distance: >3 FB  Neck ROM: full     Dental       Cardiovascular  Rhythm: regular, Rate: normal,     Pulmonary  Breath sounds clear to auscultation,     Other Findings        Anesthesia Plan  ASA Score- 3     Anesthesia Type- IV sedation with anesthesia with ASA Monitors  Additional Monitors:   Airway Plan:           Plan Factors-    Chart reviewed  Patient is not a current smoker  Induction- intravenous  Postoperative Plan-     Informed Consent- Anesthetic plan and risks discussed with patient  I personally reviewed this patient with the CRNA  Discussed and agreed on the Anesthesia Plan with the CRNA  Sami Sloan

## 2022-08-01 NOTE — OP NOTE
OPERATIVE REPORT    PATIENT NAME: Romayne Churches    :  1940  MRN: [id number]  Pt Location: [facility] OR ROOM 01    Surgery Date: 2022    Surgeon(s) and Role:     * Buddy Tavares MD - Primary    Posterior subcapsular polar age-related cataract, right eye [H25 041]    Post-Op Diagnosis Codes:     * Posterior subcapsular polar age-related cataract, right eye [H25 041]    Procedure(s):  EXTRACTION EXTRACAPSULAR CATARACT PHACO INTRAOCULAR LENS (IOL)    Anesthesia Type:   IV Sedation with Anesthesia    Operative Indications:  Posterior subcapsular polar age-related cataract, right eye [H25 041]  Decreased vision to 20/50  With problems reading  Pt requested cataract sx the right eye    Procedure and Technique:    Procedure Details     The patient was brought in the OR in stable condition and placed on the operative table  The right eye was prepped and draped in the usual sterile manner  Attention was directed to the right eye where a lid speculum was placed  A 2 4 mm clear corneal incision was made temperally  1/2 cc of 1% MPF Lidocaine was irrigated into the anterior chamber followed by viscoat  The side port incision was placed superiorly  The capsularrhexis was made and the nucleus was hydrodissected with BSS  The nucleus was then removed with the phaco handpiece followed by removal of the cortical material with the I/A handpiece  The capsular bag was then filled with Provisc  The IOL was folded and placed in to the capsular bag and centered well  The remaining Provisc was removed from the eye with the I/A  The wounds were hydrated with BSS and found to be water tight  The lid speculum was removed and 2 drops of Gatifloxicin were placed over the cornea  A protective eye shield was taped over the eye and the patient went to PACU in stable condition  I will see the patient in the office tomorrow and the expected post op period is a few weeks         Complications: None        Disposition: PACU   Condition: Stable    SIGNATURE: Hugo Lindsay MD  DATE: August 1, 2022  TIME: 1:35 PM

## 2022-08-01 NOTE — ANESTHESIA POSTPROCEDURE EVALUATION
Post-Op Assessment Note    CV Status:  Stable  Pain Score: 0    Pain management: adequate     Mental Status:  Awake   Hydration Status:  Stable   PONV Controlled:  None   Airway Patency:  Patent      Post Op Vitals Reviewed: Yes      Staff: CRNA   Comments: spontaneously breathing, educated on no operating heavu machinery for the next 24 hours or making legl decisions, endorsed to recovery w/o AC        No complications documented      BP      Temp     Pulse     Resp      SpO2   98

## 2022-08-01 NOTE — DISCHARGE INSTRUCTIONS
Dr Sim Cowden Cataract Instructions    Activity:     1  No Driving until instructed   2  Keep shield on until seen tomorrow except when administering drops   3  No heavy lifting no more than 20 lbs  4   No water in eye for 1 week  Diet:     1  Resume normal diet    Normal Symptoms:     1  Mild Headache   2  Scratchy or picky feeling around eye    Call the office if:     1  You have any questions or concerns   2  If eye pain is not relieved by extra strength tylenol    Office phone number:  [phone]      Next appointment:     1  See Dr Sim Cowden at his office tomorrow as scheduled   _______Tuesday 8/2/2022 at 9:30 am___________________________________________________   2  Bring blue eye kit with you and eyedrops to the office    A new set of comprehensive instructions will be given and reviewed with you during your office visit tomorrow

## 2022-09-23 ENCOUNTER — APPOINTMENT (OUTPATIENT)
Dept: RADIOLOGY | Facility: HOSPITAL | Age: 82
End: 2022-09-23
Payer: MEDICARE

## 2022-09-23 ENCOUNTER — HOSPITAL ENCOUNTER (EMERGENCY)
Facility: HOSPITAL | Age: 82
Discharge: HOME | End: 2022-09-23
Attending: EMERGENCY MEDICINE
Payer: MEDICARE

## 2022-09-23 VITALS
HEIGHT: 63 IN | SYSTOLIC BLOOD PRESSURE: 156 MMHG | OXYGEN SATURATION: 97 % | BODY MASS INDEX: 31.89 KG/M2 | RESPIRATION RATE: 17 BRPM | TEMPERATURE: 97.6 F | HEART RATE: 52 BPM | DIASTOLIC BLOOD PRESSURE: 70 MMHG | WEIGHT: 180 LBS

## 2022-09-23 DIAGNOSIS — R07.89 CHEST WALL PAIN: Primary | ICD-10-CM

## 2022-09-23 LAB
2HR DELTA HS TROPONIN: 1 NG/L
ALBUMIN SERPL BCP-MCNC: 3.6 G/DL (ref 3.5–5)
ALP SERPL-CCNC: 68 U/L (ref 46–116)
ALT SERPL W P-5'-P-CCNC: 14 U/L (ref 12–78)
ANION GAP SERPL CALCULATED.3IONS-SCNC: 6 MMOL/L (ref 4–13)
AST SERPL W P-5'-P-CCNC: 22 U/L (ref 5–45)
ATRIAL RATE: 55 BPM
BASOPHILS # BLD AUTO: 0.06 THOUSANDS/ΜL (ref 0–0.1)
BASOPHILS NFR BLD AUTO: 1 % (ref 0–1)
BILIRUB SERPL-MCNC: 0.45 MG/DL (ref 0.2–1)
BUN SERPL-MCNC: 19 MG/DL (ref 5–25)
CALCIUM SERPL-MCNC: 9 MG/DL (ref 8.3–10.1)
CARDIAC TROPONIN I PNL SERPL HS: 7 NG/L
CARDIAC TROPONIN I PNL SERPL HS: 8 NG/L
CHLORIDE SERPL-SCNC: 102 MMOL/L (ref 96–108)
CO2 SERPL-SCNC: 29 MMOL/L (ref 21–32)
CREAT SERPL-MCNC: 1.01 MG/DL (ref 0.6–1.3)
EOSINOPHIL # BLD AUTO: 0.33 THOUSAND/ΜL (ref 0–0.61)
EOSINOPHIL NFR BLD AUTO: 4 % (ref 0–6)
ERYTHROCYTE [DISTWIDTH] IN BLOOD BY AUTOMATED COUNT: 13.2 % (ref 11.6–15.1)
GFR SERPL CREATININE-BSD FRML MDRD: 51 ML/MIN/1.73SQ M
GLUCOSE SERPL-MCNC: 127 MG/DL (ref 65–140)
HCT VFR BLD AUTO: 39.8 % (ref 34.8–46.1)
HGB BLD-MCNC: 12.5 G/DL (ref 11.5–15.4)
IMM GRANULOCYTES # BLD AUTO: 0.04 THOUSAND/UL (ref 0–0.2)
IMM GRANULOCYTES NFR BLD AUTO: 0 % (ref 0–2)
LYMPHOCYTES # BLD AUTO: 3.42 THOUSANDS/ΜL (ref 0.6–4.47)
LYMPHOCYTES NFR BLD AUTO: 37 % (ref 14–44)
MCH RBC QN AUTO: 30.1 PG (ref 26.8–34.3)
MCHC RBC AUTO-ENTMCNC: 31.4 G/DL (ref 31.4–37.4)
MCV RBC AUTO: 96 FL (ref 82–98)
MONOCYTES # BLD AUTO: 0.76 THOUSAND/ΜL (ref 0.17–1.22)
MONOCYTES NFR BLD AUTO: 8 % (ref 4–12)
NEUTROPHILS # BLD AUTO: 4.54 THOUSANDS/ΜL (ref 1.85–7.62)
NEUTS SEG NFR BLD AUTO: 50 % (ref 43–75)
NRBC BLD AUTO-RTO: 0 /100 WBCS
P AXIS: 18 DEGREES
PLATELET # BLD AUTO: 200 THOUSANDS/UL (ref 149–390)
PMV BLD AUTO: 10.1 FL (ref 8.9–12.7)
POTASSIUM SERPL-SCNC: 4.4 MMOL/L (ref 3.5–5.3)
PR INTERVAL: 204 MS
PROT SERPL-MCNC: 7.7 G/DL (ref 6.4–8.4)
QRS AXIS: -1 DEGREES
QRSD INTERVAL: 88 MS
QT INTERVAL: 410 MS
QTC INTERVAL: 392 MS
RBC # BLD AUTO: 4.15 MILLION/UL (ref 3.81–5.12)
SODIUM SERPL-SCNC: 137 MMOL/L (ref 135–147)
T WAVE AXIS: 44 DEGREES
VENTRICULAR RATE: 55 BPM
WBC # BLD AUTO: 9.15 THOUSAND/UL (ref 4.31–10.16)

## 2022-09-23 PROCEDURE — 93005 ELECTROCARDIOGRAM TRACING: CPT

## 2022-09-23 PROCEDURE — 71045 X-RAY EXAM CHEST 1 VIEW: CPT

## 2022-09-23 PROCEDURE — 85025 COMPLETE CBC W/AUTO DIFF WBC: CPT | Performed by: EMERGENCY MEDICINE

## 2022-09-23 PROCEDURE — 99285 EMERGENCY DEPT VISIT HI MDM: CPT

## 2022-09-23 PROCEDURE — 36415 COLL VENOUS BLD VENIPUNCTURE: CPT | Performed by: EMERGENCY MEDICINE

## 2022-09-23 PROCEDURE — 99284 EMERGENCY DEPT VISIT MOD MDM: CPT | Performed by: EMERGENCY MEDICINE

## 2022-09-23 PROCEDURE — 84484 ASSAY OF TROPONIN QUANT: CPT | Performed by: EMERGENCY MEDICINE

## 2022-09-23 PROCEDURE — 93010 ELECTROCARDIOGRAM REPORT: CPT | Performed by: INTERNAL MEDICINE

## 2022-09-23 PROCEDURE — 80053 COMPREHEN METABOLIC PANEL: CPT | Performed by: EMERGENCY MEDICINE

## 2022-09-23 RX ORDER — ICOSAPENT ETHYL 1000 MG/1
CAPSULE ORAL
COMMUNITY
Start: 2022-07-28

## 2022-09-23 NOTE — ED PROVIDER NOTES
History  Chief Complaint   Patient presents with    Chest Pain     Pt  States started having chest pain last night around 11pm  took 3 baby Asprin and went to bed  Woke up today and   felt ok then began having chest pain again that radiates to right shoulder  No nausea, vomiting, no SOB     81yoF hx CAD,HTN,HLD presents c/o R sided chest pain worse with palpation for a few days  Not exertional            Prior to Admission Medications   Prescriptions Last Dose Informant Patient Reported? Taking? Biotin 2500 MCG CAPS Not Taking at Unknown time  Yes No   Sig: Take by mouth daily  Patient not taking: Reported on 9/23/2022   Cholecalciferol (VITAMIN D-3 PO) Not Taking at Unknown time  Yes No   Sig: Take by mouth daily  Patient not taking: Reported on 9/23/2022   Omega-3 Fatty Acids (FISH OIL) 1200 MG CAPS 9/22/2022 at Unknown time  Yes Yes   Sig: Take by mouth 2 (two) times a day     Saccharomyces boulardii (Probiotic) 250 MG CAPS 9/22/2022 at Unknown time  Yes Yes   Sig: Take by mouth   Vascepa 1 g CAPS 9/22/2022 at Unknown time  Yes Yes   Sig: TAKE 2 CAPSULES BY ORAL ROUTE 2 TIMES EVERY DAY WITH FOOD SWALLOWING WHOLE  DO NOT CHEW, OPEN, DISSOLVE AND/OR CRUSH    ammonium lactate (LAC-HYDRIN) 12 % cream Not Taking at Unknown time  No No   Sig: Apply topically as needed for dry skin   Patient not taking: Reported on 9/23/2022   diclofenac sodium (VOLTAREN) 1 % 9/22/2022 at Unknown time  No Yes   Sig: Apply 2 g topically 4 (four) times a day   gatifloxacin (ZYMAXID) 0 5 % Not Taking at Unknown time  No No   Sig: Administer 1 drop to the right eye 2 (two) times a day   Patient not taking: Reported on 9/23/2022   hydrochlorothiazide (HYDRODIURIL) 12 5 mg tablet 9/22/2022 at Unknown time  Yes Yes   Sig: Take 12 5 mg by mouth daily   ketorolac (ACULAR) 0 5 % ophthalmic solution Not Taking at Unknown time  No No   Sig: Administer 1 drop to the right eye 4 (four) times a day   Patient not taking: Reported on 9/23/2022 levothyroxine 100 mcg tablet 9/23/2022 at Unknown time  Yes Yes   Sig: Take 137 mcg by mouth daily   metoprolol succinate (TOPROL-XL) 25 mg 24 hr tablet 9/23/2022 at Unknown time  Yes Yes   Sig: Take 25 mg by mouth daily   ondansetron (ZOFRAN) 4 mg tablet   No No   Sig: Take 1 tablet (4 mg total) by mouth every 8 (eight) hours as needed for nausea or vomiting for up to 10 doses  simvastatin (ZOCOR) 20 mg tablet 9/22/2022 at Unknown time  Yes Yes   Sig: Take 20 mg by mouth daily at bedtime  valsartan (DIOVAN) 160 mg tablet Not Taking at Unknown time  Yes No   Sig: Take 160 mg by mouth daily   Patient not taking: Reported on 9/23/2022      Facility-Administered Medications: None       Past Medical History:   Diagnosis Date    Coronary artery disease     Hyperlipidemia     Hypertension     Hypothyroidism     MI, old     UTI (urinary tract infection) 07/26/2022       Past Surgical History:   Procedure Laterality Date    CHOLECYSTECTOMY      CORONARY ANGIOPLASTY      2001     IL XCAPSL CTRC RMVL INSJ IO LENS PROSTH W/O ECP Right 8/1/2022    Procedure: EXTRACTION EXTRACAPSULAR CATARACT PHACO INTRAOCULAR LENS (IOL); Surgeon: Tano Chery MD;  Location: [facility] MAIN OR;  Service: Ophthalmology    TONSILLECTOMY         Family History   Problem Relation Age of Onset    Lung cancer Mother     Cirrhosis Father     Heart attack Sister     No Known Problems Daughter     No Known Problems Sister     No Known Problems Sister     No Known Problems Daughter     No Known Problems Daughter     No Known Problems Maternal Aunt     No Known Problems Maternal Aunt     No Known Problems Maternal Aunt     No Known Problems Maternal Aunt     No Known Problems Maternal Aunt      I have reviewed and agree with the history as documented      E-Cigarette/Vaping     E-Cigarette/Vaping Substances     Social History     Tobacco Use    Smoking status: Never Smoker    Smokeless tobacco: Never Used   Substance Use Topics  Alcohol use: No    Drug use: No       Review of Systems   Constitutional: Negative for fever  Respiratory: Negative for cough  Gastrointestinal: Negative for abdominal pain  Musculoskeletal: Negative for back pain  Neurological: Negative for headaches  All other systems reviewed and are negative  Physical Exam  Physical Exam  Vitals reviewed  Constitutional:       Appearance: She is well-developed  HENT:      Head: Normocephalic and atraumatic  Right Ear: External ear normal       Left Ear: External ear normal       Nose: Nose normal  No rhinorrhea  Mouth/Throat:      Mouth: Mucous membranes are moist    Eyes:      Conjunctiva/sclera: Conjunctivae normal    Cardiovascular:      Rate and Rhythm: Normal rate and regular rhythm  Pulmonary:      Effort: Pulmonary effort is normal       Breath sounds: Normal breath sounds  No wheezing or rales  Abdominal:      Palpations: Abdomen is soft  Tenderness: There is no abdominal tenderness  Musculoskeletal:      Cervical back: Neck supple  Right lower leg: No edema  Left lower leg: No edema  Skin:     General: Skin is warm and dry  Neurological:      Mental Status: She is alert and oriented to person, place, and time     Psychiatric:         Mood and Affect: Mood normal          Vital Signs  ED Triage Vitals   Temperature Pulse Respirations Blood Pressure SpO2   09/23/22 1133 09/23/22 1133 09/23/22 1133 09/23/22 1134 09/23/22 1133   97 6 °F (36 4 °C) 64 18 (!) 177/81 96 %      Temp src Heart Rate Source Patient Position - Orthostatic VS BP Location FiO2 (%)   -- 09/23/22 1133 -- -- --    Monitor         Pain Score       09/23/22 1133       3           Vitals:    09/23/22 1134 09/23/22 1245 09/23/22 1315 09/23/22 1345   BP: (!) 177/81 122/60 104/58 156/70   Pulse:  (!) 50  (!) 52         Visual Acuity      ED Medications  Medications - No data to display    Diagnostic Studies  Results Reviewed     Procedure Component Value Units Date/Time    HS Troponin I 2hr [[id number]]  (Normal) Collected: 09/23/22 1343    Lab Status: Final result Specimen: Blood from Arm, Right Updated: 09/23/22 1417     hs TnI 2hr 8 ng/L      Delta 2hr hsTnI 1 ng/L     HS Troponin 0hr (reflex protocol) [[id number]]  (Normal) Collected: 09/23/22 1156    Lab Status: Final result Specimen: Blood from Arm, Right Updated: 09/23/22 1228     hs TnI 0hr 7 ng/L     HS Troponin I 4hr [[id number]]     Lab Status: No result Specimen: Blood     Comprehensive metabolic panel [[id number]] Collected: 09/23/22 1156    Lab Status: Final result Specimen: Blood from Arm, Right Updated: 09/23/22 1222     Sodium 137 mmol/L      Potassium 4 4 mmol/L      Chloride 102 mmol/L      CO2 29 mmol/L      ANION GAP 6 mmol/L      BUN 19 mg/dL      Creatinine 1 01 mg/dL      Glucose 127 mg/dL      Calcium 9 0 mg/dL      AST 22 U/L      ALT 14 U/L      Alkaline Phosphatase 68 U/L      Total Protein 7 7 g/dL      Albumin 3 6 g/dL      Total Bilirubin 0 45 mg/dL      eGFR 51 ml/min/1 73sq m     Narrative:      [place] guidelines for Chronic Kidney Disease (CKD):     Stage 1 with normal or high GFR (GFR > 90 mL/min/1 73 square meters)    Stage 2 Mild CKD (GFR = 60-89 mL/min/1 73 square meters)    Stage 3A Moderate CKD (GFR = 45-59 mL/min/1 73 square meters)    Stage 3B Moderate CKD (GFR = 30-44 mL/min/1 73 square meters)    Stage 4 Severe CKD (GFR = 15-29 mL/min/1 73 square meters)    Stage 5 End Stage CKD (GFR <15 mL/min/1 73 square meters)  Note: GFR calculation is accurate only with a steady state creatinine    CBC and differential [[id number]] Collected: 09/23/22 1156    Lab Status: Final result Specimen: Blood from Arm, Right Updated: 09/23/22 1202     WBC 9 15 Thousand/uL      RBC 4 15 Million/uL      Hemoglobin 12 5 g/dL      Hematocrit 39 8 %      MCV 96 fL      MCH 30 1 pg      MCHC 31 4 g/dL      RDW 13 2 %      MPV 10 1 fL      Platelets 216 Thousands/uL nRBC 0 /100 WBCs      Neutrophils Relative 50 %      Immat GRANS % 0 %      Lymphocytes Relative 37 %      Monocytes Relative 8 %      Eosinophils Relative 4 %      Basophils Relative 1 %      Neutrophils Absolute 4 54 Thousands/µL      Immature Grans Absolute 0 04 Thousand/uL      Lymphocytes Absolute 3 42 Thousands/µL      Monocytes Absolute 0 76 Thousand/µL      Eosinophils Absolute 0 33 Thousand/µL      Basophils Absolute 0 06 Thousands/µL                  XR chest 1 view portable   Final Result by Triny Dodd MD (09/23 1242)      No acute cardiopulmonary disease  Findings are stable            Workstation performed: [id number]                    Procedures  Procedures         ED Course                                             MDM  Number of Diagnoses or Management Options  Diagnosis management comments: Reproducible CP  EKG nonischemic  Trops neg x 2  Cleared for outpt fu with PCP        Disposition  Final diagnoses:   Chest wall pain     Time reflects when diagnosis was documented in both MDM as applicable and the Disposition within this note     Time User Action Codes Description Comment    9/23/2022  3:00 PM Abebe Ilia Add [R07 89] Chest wall pain       ED Disposition     ED Disposition   Discharge    Condition   Stable    Date/Time   Fri Sep 23, 2022  3:00 PM    Comment   Amisha Mata discharge to home/self care  Follow-up Information     Follow up With Specialties Details Why Contact Info    Karmen Mayes DO Family Medicine In 1 week  [address]  [phone]            Patient's Medications   Discharge Prescriptions    No medications on file       No discharge procedures on file      PDMP Review     None          ED Provider  Electronically Signed by           Marlin Atwood DO  09/23/22 1500

## 2022-10-11 NOTE — PRE-PROCEDURE INSTRUCTIONS
Pre-Surgery Instructions:   Medication Instructions   • ammonium lactate (LAC-HYDRIN) 12 % cream Hold day of surgery  • Biotin 2500 MCG CAPS Hold day of surgery  • Cholecalciferol (VITAMIN D-3 PO) Hold day of surgery  • diclofenac sodium (VOLTAREN) 1 % Hold day of surgery  • hydrochlorothiazide (HYDRODIURIL) 12 5 mg tablet Hold day of surgery  • levothyroxine 100 mcg tablet Take day of surgery  • metoprolol succinate (TOPROL-XL) 25 mg 24 hr tablet Take day of surgery  • ondansetron (ZOFRAN) 4 mg tablet Hold day of surgery  • Saccharomyces boulardii (Probiotic) 250 MG CAPS Hold day of surgery  • simvastatin (ZOCOR) 20 mg tablet Hold day of surgery  • valsartan (DIOVAN) 160 mg tablet Hold day of surgery  • Vascepa 1 g CAPS Hold day of surgery  Pre op instructions reviewed with pt via phone  Instructed to take levothyroxine and metoprolol a m of surgery   granddaherbert Steven, to provide mobile # on arrival  My Surgical Experience    The following information was developed to assist you to prepare for your operation  What do I need to do before coming to the hospital?  • Arrange for a responsible person to drive you to and from the hospital   • Arrange care for your children at home  Children are not allowed in the recovery areas of the hospital  • Plan to wear clothing that is easy to put on and take off  If you are having shoulder surgery, wear a shirt that buttons or zippers in the front  Bathing  o Shower the evening before and the morning of your surgery with an antibacterial soap  Please refer to the Pre Op Showering Instructions for Surgery Patients Sheet   o Remove nail polish and all body piercing jewelry  o Do not shave any body part for at least 24 hours before surgery-this includes face, arms, legs and upper body  Food  o Nothing to eat or drink after midnight the night before your surgery   This includes candy and chewing gum  o Exception: If your surgery is after 12:00pm (noon), you may have clear liquids such as 7-Up®, ginger ale, apple or cranberry juice, Jell-O®, water, or clear broth until 8:00 am  o Do not drink milk or juice with pulp on the morning before surgery  o Do not drink alcohol 24 hours before surgery  Medicine  o Follow instructions you received from your surgeon about which medicines you may take on the day of surgery  o If instructed to take medicine on the morning of surgery, take pills with just a small sip of water  Call your prescribing doctor for specific information on what to do if you take insulin    What should I bring to the hospital?    Bring:  • Crutches or a walker, if you have them, for foot or knee surgery  • A list of the daily medicines, vitamins, minerals, herbals and nutritional supplements you take  Include the dosages of medicines and the time you take them each day  • Glasses, dentures or hearing aids  • Minimal clothing; you will be wearing hospital sleepwear  • Photo ID; required to verify your identity  • If you have a Living Will or Power of , bring a copy of the documents  • If you have an ostomy, bring an extra pouch and any supplies you use    Do not bring  • Medicines or inhalers  • Money, valuables or jewelry    What other information should I know about the day of surgery? • Notify your surgeons if you develop a cold, sore throat, cough, fever, rash or any other illness  • Report to the Ambulatory Surgical/Same Day Surgery Unit  • You will be instructed to stop at Registration only if you have not been pre-registered  • Inform your  fi they do not stay that they will be asked by the staff to leave a phone number where they can be reached  • Be available to be reached before surgery   In the event the operating room schedule changes, you may be asked to come in earlier or later than expected    *It is important to tell your doctor and others involved in your health care if you are taking or have been taking any non-prescription drugs, vitamins, minerals, herbals or other nutritional supplements   Any of these may interact with some food or medicines and cause a reaction

## 2022-10-17 ENCOUNTER — ANESTHESIA (OUTPATIENT)
Dept: PERIOP | Facility: AMBULARY SURGERY CENTER | Age: 82
End: 2022-10-17
Payer: MEDICARE

## 2022-10-17 ENCOUNTER — HOSPITAL ENCOUNTER (OUTPATIENT)
Facility: AMBULARY SURGERY CENTER | Age: 82
Setting detail: OUTPATIENT SURGERY
Discharge: HOME | End: 2022-10-17
Attending: OPHTHALMOLOGY | Admitting: OPHTHALMOLOGY
Payer: MEDICARE

## 2022-10-17 ENCOUNTER — ANESTHESIA EVENT (OUTPATIENT)
Dept: PERIOP | Facility: AMBULARY SURGERY CENTER | Age: 82
End: 2022-10-17
Payer: MEDICARE

## 2022-10-17 VITALS
TEMPERATURE: 98.7 F | OXYGEN SATURATION: 96 % | SYSTOLIC BLOOD PRESSURE: 143 MMHG | RESPIRATION RATE: 18 BRPM | DIASTOLIC BLOOD PRESSURE: 61 MMHG | HEART RATE: 54 BPM

## 2022-10-17 DIAGNOSIS — H25.812 COMBINED FORMS OF AGE-RELATED CATARACT OF LEFT EYE: Primary | ICD-10-CM

## 2022-10-17 PROCEDURE — V2632 POST CHMBR INTRAOCULAR LENS: HCPCS | Performed by: OPHTHALMOLOGY

## 2022-10-17 DEVICE — ACRYSOF(R) IQ ASPHERIC NATURAL IOL, SINGLE-PIECE ACRYLIC FOLDABLE PCL, UV WITH BLUE LIGHTFILTER, 13.0MM LENGTH, 6.0MM ANTERIORASYMMETRIC BICONVEX OPTIC, PLANAR HAPTICS.
Type: IMPLANTABLE DEVICE | Site: EYE | Status: FUNCTIONAL
Brand: ACRYSOF®

## 2022-10-17 RX ORDER — TETRACAINE HYDROCHLORIDE 5 MG/ML
1 SOLUTION OPHTHALMIC ONCE
Status: COMPLETED | OUTPATIENT
Start: 2022-10-17 | End: 2022-10-17

## 2022-10-17 RX ORDER — GATIFLOXACIN 5 MG/ML
1 SOLUTION/ DROPS OPHTHALMIC 2 TIMES DAILY
Qty: 3 ML | Refills: 0
Start: 2022-10-17

## 2022-10-17 RX ORDER — BALANCED SALT SOLUTION 6.4; .75; .48; .3; 3.9; 1.7 MG/ML; MG/ML; MG/ML; MG/ML; MG/ML; MG/ML
SOLUTION OPHTHALMIC AS NEEDED
Status: DISCONTINUED | OUTPATIENT
Start: 2022-10-17 | End: 2022-10-17 | Stop reason: HOSPADM

## 2022-10-17 RX ORDER — CYCLOPENTOLATE HYDROCHLORIDE 10 MG/ML
1 SOLUTION/ DROPS OPHTHALMIC
Status: ACTIVE | OUTPATIENT
Start: 2022-10-17 | End: 2022-10-17

## 2022-10-17 RX ORDER — KETOROLAC TROMETHAMINE 5 MG/ML
1 SOLUTION OPHTHALMIC 4 TIMES DAILY
Qty: 5 ML | Refills: 0
Start: 2022-10-17

## 2022-10-17 RX ORDER — PHENYLEPHRINE HCL 2.5 %
1 DROPS OPHTHALMIC (EYE)
Status: ACTIVE | OUTPATIENT
Start: 2022-10-17 | End: 2022-10-17

## 2022-10-17 RX ORDER — KETOROLAC TROMETHAMINE 5 MG/ML
1 SOLUTION OPHTHALMIC
Status: ACTIVE | OUTPATIENT
Start: 2022-10-17 | End: 2022-10-17

## 2022-10-17 RX ORDER — LIDOCAINE HYDROCHLORIDE 10 MG/ML
INJECTION, SOLUTION EPIDURAL; INFILTRATION; INTRACAUDAL; PERINEURAL AS NEEDED
Status: DISCONTINUED | OUTPATIENT
Start: 2022-10-17 | End: 2022-10-17 | Stop reason: HOSPADM

## 2022-10-17 RX ORDER — TETRACAINE HYDROCHLORIDE 5 MG/ML
SOLUTION OPHTHALMIC AS NEEDED
Status: DISCONTINUED | OUTPATIENT
Start: 2022-10-17 | End: 2022-10-17 | Stop reason: HOSPADM

## 2022-10-17 RX ORDER — ONDANSETRON 2 MG/ML
4 INJECTION INTRAMUSCULAR; INTRAVENOUS ONCE AS NEEDED
Status: DISCONTINUED | OUTPATIENT
Start: 2022-10-17 | End: 2022-10-17 | Stop reason: HOSPADM

## 2022-10-17 RX ORDER — MIDAZOLAM HYDROCHLORIDE 2 MG/2ML
INJECTION, SOLUTION INTRAMUSCULAR; INTRAVENOUS AS NEEDED
Status: DISCONTINUED | OUTPATIENT
Start: 2022-10-17 | End: 2022-10-17

## 2022-10-17 RX ORDER — LIDOCAINE HYDROCHLORIDE 20 MG/ML
1 JELLY TOPICAL
Status: COMPLETED | OUTPATIENT
Start: 2022-10-17 | End: 2022-10-17

## 2022-10-17 RX ORDER — GATIFLOXACIN 5 MG/ML
SOLUTION/ DROPS OPHTHALMIC AS NEEDED
Status: DISCONTINUED | OUTPATIENT
Start: 2022-10-17 | End: 2022-10-17 | Stop reason: HOSPADM

## 2022-10-17 RX ADMIN — LIDOCAINE HYDROCHLORIDE 1 APPLICATION: 20 JELLY TOPICAL at 13:15

## 2022-10-17 RX ADMIN — KETOROLAC TROMETHAMINE 1 DROP: 5 SOLUTION OPHTHALMIC at 13:31

## 2022-10-17 RX ADMIN — LIDOCAINE HYDROCHLORIDE 1 APPLICATION: 20 JELLY TOPICAL at 13:46

## 2022-10-17 RX ADMIN — MIDAZOLAM 2 MG: 1 INJECTION INTRAMUSCULAR; INTRAVENOUS at 14:03

## 2022-10-17 RX ADMIN — KETOROLAC TROMETHAMINE 1 DROP: 5 SOLUTION OPHTHALMIC at 13:11

## 2022-10-17 RX ADMIN — KETOROLAC TROMETHAMINE 1 DROP: 5 SOLUTION OPHTHALMIC at 13:46

## 2022-10-17 RX ADMIN — PHENYLEPHRINE HYDROCHLORIDE 1 DROP: 25 SOLUTION/ DROPS OPHTHALMIC at 13:11

## 2022-10-17 RX ADMIN — CYCLOPENTOLATE HYDROCHLORIDE 1 DROP: 10 SOLUTION/ DROPS OPHTHALMIC at 13:46

## 2022-10-17 RX ADMIN — LIDOCAINE HYDROCHLORIDE 1 APPLICATION: 20 JELLY TOPICAL at 13:11

## 2022-10-17 RX ADMIN — CYCLOPENTOLATE HYDROCHLORIDE 1 DROP: 10 SOLUTION/ DROPS OPHTHALMIC at 13:31

## 2022-10-17 RX ADMIN — TETRACAINE HYDROCHLORIDE 1 DROP: 5 SOLUTION OPHTHALMIC at 13:11

## 2022-10-17 RX ADMIN — PHENYLEPHRINE HYDROCHLORIDE 1 DROP: 25 SOLUTION/ DROPS OPHTHALMIC at 13:31

## 2022-10-17 RX ADMIN — CYCLOPENTOLATE HYDROCHLORIDE 1 DROP: 10 SOLUTION/ DROPS OPHTHALMIC at 13:11

## 2022-10-17 RX ADMIN — PHENYLEPHRINE HYDROCHLORIDE 1 DROP: 25 SOLUTION/ DROPS OPHTHALMIC at 13:46

## 2022-10-17 NOTE — DISCHARGE INSTRUCTIONS
Dr Faith Gloria Cataract Instructions    Activity:     1  No Driving until instructed   2  Keep shield on until seen tomorrow except when administering drops   3  No heavy lifting   4  No water in eye     Diet:     1  Resume normal diet    Normal Symptoms:     1  Mild Headache   2  Scratchy or picky feeling around eye    Call the office if:     1  You have any questions or concerns   2  If eye pain is not relieved by extra strength tylenol    Office phone number:  [phone]      Next appointment:     1  See Dr Faith Gloria at his office tomorrow as scheduled   ____8:30 am_____   2  Bring blue eye kit with you and eyedrops to the office    A new set of comprehensive instructions will be given and reviewed with you during your office visit tomorrow

## 2022-10-17 NOTE — ANESTHESIA PREPROCEDURE EVALUATION
Procedure:  EXTRACTION EXTRACAPSULAR CATARACT PHACO INTRAOCULAR LENS (IOL) (Left Eye)    Relevant Problems   ANESTHESIA (within normal limits)      CARDIO   (+) CAD (coronary artery disease)   (+) HTN (hypertension)   (+) Hyperlipidemia   (+) MI (myocardial infarction) (HCC)      ENDO   (+) Hypothyroidism      PULMONARY (within normal limits)        Physical Exam    Airway    Mallampati score: II  TM Distance: >3 FB  Neck ROM: full     Dental   Comment: Denies loose ,     Cardiovascular  Rhythm: regular, Rate: normal,     Pulmonary  Breath sounds clear to auscultation,     Other Findings        Anesthesia Plan  ASA Score- 3     Anesthesia Type- IV sedation with anesthesia with ASA Monitors  Additional Monitors:   Airway Plan:           Plan Factors-Exercise tolerance (METS): >4 METS  Chart reviewed  Existing labs reviewed  Patient summary reviewed  Patient is not a current smoker  Induction- intravenous  Postoperative Plan-     Informed Consent- Anesthetic plan and risks discussed with patient  I personally reviewed this patient with the CRNA  Discussed and agreed on the Anesthesia Plan with the CRNA  Ludin Orellana

## 2022-10-17 NOTE — ANESTHESIA POSTPROCEDURE EVALUATION
Post-Op Assessment Note    CV Status:  Stable  Pain Score: 0    Pain management: adequate     Mental Status:  Alert and awake   Hydration Status:  Stable   PONV Controlled:  None   Airway Patency:  Patent and adequate      Post Op Vitals Reviewed: Yes      Staff: CRNA         No complications documented      BP      Temp      Pulse     Resp      SpO2

## 2022-10-17 NOTE — OP NOTE
OPERATIVE REPORT    PATIENT NAME: Amisha Mata    :  1940  MRN: [id number]  Pt Location: [facility] OR ROOM 01    Surgery Date: 10/17/2022    Surgeon(s) and Role:     * Benito Winslow MD - Primary    Age-related nuclear cataract, left eye [H25 12]    Post-Op Diagnosis Codes:     * Age-related nuclear cataract, left eye [H25 12]    Procedure(s):  EXTRACTION EXTRACAPSULAR CATARACT PHACO INTRAOCULAR LENS (IOL)    Anesthesia Type:   IV Sedation with Anesthesia    Operative Indications:  Age-related nuclear cataract, left eye [H25 12]  Decreased vision to 20/40  With problems reading  Pt requested cataract sx the left eye    Procedure and Technique:    Procedure Details     The patient was brought in the OR in stable condition and placed on the operative table  The left eye was prepped and draped in the usual sterile manner  Attention was directed to the left eye where a lid speculum was placed  A 2 4 mm clear corneal incision was made temperally  1/2 cc of 1% MPF Lidocaine was irrigated into the anterior chamber followed by viscoat  The side port incision was placed superiorly  The capsularrhexis was made and the nucleus was hydrodissected with BSS  The nucleus was then removed with the phaco handpiece followed by removal of the cortical material with the I/A handpiece  The capsular bag was then filled with Provisc  The IOL was folded and placed in to the capsular bag and centered well  The remaining Provisc was removed from the eye with the I/A  The wounds were hydrated with BSS and found to be water tight  The lid speculum was removed and 2 drops of Gatifloxicin were placed over the cornea  A protective eye shield was taped over the eye and the patient went to PACU in stable condition  I will see the patient in the office tomorrow and the expected post op period is a few weeks         Complications: None        Disposition: PACU   Condition: Stable    SIGNATURE: Benito Winslow MD  DATE:  2022  TIME: 2:22 PM

## 2023-02-23 ENCOUNTER — OFFICE VISIT (OUTPATIENT)
Dept: DERMATOLOGY | Age: 83
End: 2023-02-23

## 2023-02-23 VITALS — BODY MASS INDEX: 32.82 KG/M2 | WEIGHT: 185.2 LBS | HEIGHT: 63 IN | TEMPERATURE: 98.6 F

## 2023-02-23 DIAGNOSIS — D18.01 CHERRY ANGIOMA: ICD-10-CM

## 2023-02-23 DIAGNOSIS — L81.4 SOLAR LENTIGO: ICD-10-CM

## 2023-02-23 DIAGNOSIS — L82.1 SEBORRHEIC KERATOSIS: Primary | ICD-10-CM

## 2023-02-23 RX ORDER — NITROGLYCERIN 0.4 MG/1
TABLET SUBLINGUAL
COMMUNITY
Start: 2022-09-29

## 2023-02-23 RX ORDER — CHOLECALCIFEROL (VITAMIN D3) 100000/G
50 POWDER (GRAM) MISCELLANEOUS DAILY
COMMUNITY

## 2023-02-23 RX ORDER — LEVOTHYROXINE SODIUM 137 MCG
137 TABLET ORAL DAILY
COMMUNITY
Start: 2023-02-08

## 2023-02-23 NOTE — PATIENT INSTRUCTIONS
LENTIGO    Assessment and Plan:  Based on a thorough discussion of this condition and the management approach to it (including a comprehensive discussion of the known risks, side effects and potential benefits of treatment), the patient (family) agrees to implement the following specific plan:  Monitor for changes   Full skin recommended every 2 years     What is a lentigo? A lentigo is a pigmented flat or slightly raised lesion with a clearly defined edge  Unlike an ephelis (freckle), it does not fade in the winter months  There are several kinds of lentigo  The name lentigo originally referred to its appearance resembling a small lentil  The plural of lentigo is lentigines, although “lentigos” is also in common use  SEBORRHEIC KERATOSIS; NON-INFLAMED     Assessment and Plan:  Based on a thorough discussion of this condition and the management approach to it (including a comprehensive discussion of the known risks, side effects and potential benefits of treatment), the patient (family) agrees to implement the following specific plan:  Reassured benign   Removal discussed if becomes bothersome $150 for up to 10 lesions (cosmetic)     Seborrheic Keratosis  A seborrheic keratosis is a harmless warty spot that appears during adult life as a common sign of skin aging  Seborrheic keratoses can arise on any area of skin, covered or uncovered, with the usual exception of the palms and soles  They do not arise from mucous membranes  Seborrheic keratoses can have highly variable appearance  Seborrheic keratoses are extremely common  It has been estimated that over 90% of adults over the age of 61 years have one or more of them  They occur in males and females of all races, typically beginning to erupt in the 35s or 45s  They are uncommon under the age of 21 years  The precise cause of seborrhoeic keratoses is not known  Seborrhoeic keratoses are considered degenerative in nature   As time goes by, seborrheic keratoses tend to become more numerous  Some people inherit a tendency to develop a very large number of them; some people may have hundreds of them  PALMAR PITS     Assessment and Plan:  Based on a thorough discussion of this condition and the management approach to it (including a comprehensive discussion of the known risks, side effects and potential benefits of treatment), the patient (family) agrees to implement the following specific plan:  Reassured benign     CHERRY ANGIOMAS    Assessment and Plan:  Based on a thorough discussion of this condition and the management approach to it (including a comprehensive discussion of the known risks, side effects and potential benefits of treatment), the patient (family) agrees to implement the following specific plan:  Reassured benign   Removal discussed if becomes bothersome $150 for up to 10 lesions (cosmetic)     Assessment and Plan:    Cherry angioma, also known as Pranav Curling spots, are benign vascular skin lesions  A "cherry angioma" is a firm red, blue or purple papule, 0 1-1 cm in diameter  When thrombosed, they can appear black in colour until evaluated with a dermatoscope when the red or purple colour is more easily seen  Cherry angioma may develop on any part of the body but most often appear on the scalp, face, lips and trunk  An angioma is due to proliferating endothelial cells; these are the cells that line the inside of a blood vessel

## 2023-02-23 NOTE — PROGRESS NOTES
[facility] Note     Patient Name: Mona Luna  Encounter Date: 02/23/2023     Have you been cared for by a [place] [id number] Dermatologist in the last 3 years and, if so, which description applies to you? NO  I am considered a "new" patient and must complete all patient intake questions  I am FEMALE/of child-bearing potential     REVIEW OF SYSTEMS:  Have you recently had or currently have any of the following? · Recent fever or chills? No  · Any non-healing wound? No  · Are you pregnant or planning to become pregnant? No  · Are you currently or planning to be nursing or breast feeding? No   PAST MEDICAL HISTORY:  Have you personally ever had or currently have any of the following? If "YES," then please provide more detail  · Skin cancer (such as Melanoma, Basal Cell Carcinoma, Squamous Cell Carcinoma? No  · Tuberculosis, HIV/AIDS, Hepatitis B or C: No  · Systemic Immunosuppression such as Diabetes, Biologic or Immunotherapy, Chemotherapy, Organ Transplantation, Bone Marrow Transplantation No  · Radiation Treatment No   FAMILY HISTORY:  Any "first degree relatives" (parent, brother, sister, or child) with the following? • Skin Cancer, Pancreatic or Other Cancer? No   PATIENT EXPERIENCE:    • Do you want the Dermatologist to perform a COMPLETE skin exam today including a clinical examination under the "bra and underwear" areas? Yes  • If necessary, do we have your permission to call and leave a detailed message on your Preferred Phone number that includes your specific medical information?   Yes      Allergies   Allergen Reactions   • Iodinated Contrast Media Hives      Current Outpatient Medications:   •  ammonium lactate (LAC-HYDRIN) 12 % cream, Apply topically as needed for dry skin, Disp: 385 g, Rfl: 0  •  Biotin 2500 MCG CAPS, Take by mouth daily, Disp: , Rfl:   •  Cholecalciferol (VITAMIN D-3 PO), Take by mouth daily, Disp: , Rfl:   •  diclofenac sodium (VOLTAREN) 1 %, Apply 2 g topically 4 (four) times a day, Disp: 1 Tube, Rfl: 3  •  gatifloxacin (ZYMAXID) 0 5 %, Administer 1 drop into the left eye 2 (two) times a day, Disp: 3 mL, Rfl: 0  •  hydrochlorothiazide (HYDRODIURIL) 12 5 mg tablet, Take 12 5 mg by mouth daily, Disp: , Rfl:   •  ketorolac (ACULAR) 0 5 % ophthalmic solution, Administer 1 drop into the left eye 4 (four) times a day, Disp: 5 mL, Rfl: 0  •  levothyroxine 100 mcg tablet, Take 137 mcg by mouth daily, Disp: , Rfl:   •  metoprolol succinate (TOPROL-XL) 25 mg 24 hr tablet, Take 25 mg by mouth daily, Disp: , Rfl:   •  ondansetron (ZOFRAN) 4 mg tablet, Take 1 tablet (4 mg total) by mouth every 8 (eight) hours as needed for nausea or vomiting for up to 10 doses  , Disp: 10 tablet, Rfl: 0  •  Saccharomyces boulardii (Probiotic) 250 MG CAPS, Take by mouth, Disp: , Rfl:   •  simvastatin (ZOCOR) 20 mg tablet, Take 20 mg by mouth daily at bedtime  , Disp: , Rfl:   •  valsartan (DIOVAN) 160 mg tablet, Take 160 mg by mouth daily, Disp: , Rfl:   •  Vascepa 1 g CAPS, TAKE 2 CAPSULES BY ORAL ROUTE 2 TIMES EVERY DAY WITH FOOD SWALLOWING WHOLE  DO NOT CHEW, OPEN, DISSOLVE AND/OR CRUSH , Disp: , Rfl:           • Whom besides the patient is providing clinical information about today's encounter?   o NO ADDITIONAL HISTORIAN (patient alone provided history)    Physical Exam and Assessment/Plan by Diagnosis:    LENTIGO    Physical Exam:  • Anatomic Location Affected:  Trunk  • Morphological Description:  Several tan brown macules   • Pertinent Positives:  • Pertinent Negatives:     Additional History of Present Condition:  Here for skin exam     Assessment and Plan:  Based on a thorough discussion of this condition and the management approach to it (including a comprehensive discussion of the known risks, side effects and potential benefits of treatment), the patient (family) agrees to implement the following specific plan:  • Monitor for changes   • Full skin exam recommended every 2 years What is a lentigo? A lentigo is a pigmented flat or slightly raised lesion with a clearly defined edge  Unlike an ephelis (freckle), it does not fade in the winter months  There are several kinds of lentigo  The name lentigo originally referred to its appearance resembling a small lentil  The plural of lentigo is lentigines, although “lentigos” is also in common use  Who gets lentigines? Lentigines can affect males and females of all ages and races  Solar lentigines are especially prevalent in fair skinned adults  Lentigines associated with syndromes are present at birth or arise during childhood  What causes lentigines? Common forms of lentigo are due to exposure to ultraviolet radiation:  • Sun damage including sunburn   • Indoor tanning   • Phototherapy, especially photochemotherapy (PUVA)    Ionizing radiation, eg radiation therapy, can also cause lentigines  Several familial syndromes associated with widespread lentigines originate from mutations in Jez-MAP kinase, mTOR signaling and PTEN pathways  What are the clinical features of lentigines? Lentigines have been classified into several different types depending on what they look like, where they appear on the body, causative factors, and whether they are associated to other diseases or conditions  Lentigines may be solitary or more often, multiple  Most lentigines are smaller than 5 mm in diameter      Lentigo simplex  • A precursor to junctional naevus   • Arises during childhood and early adult life   • Found on trunk and limbs   • Small brown round or oval macule or thin plaque   • Jagged or smooth edge   • May have a dry surface   • May disappear in time  Solar lentigo  • A precursor to seborrhoeic keratosis   • Found on chronically sun exposed sites such as hands, face, lower legs   • May also follow sunburn to shoulders   • Yellow, light or dark brown regular or irregular macule or thin plaque   • May have a dry surface   • Often has moth-eaten outline   • Can slowly enlarge to several centimeters in diameter   • May disappear, often through the process known as lichenoid keratosis   • When atypical in appearance, may be difficult to distinguish from melanoma in situ  Ink spot lentigo  • Also known as reticulated lentigo   • Few in number compared to solar lentigines   • Follows sunburn in very fair skinned individuals   • Dark brown to black irregular ink spot-like macule  PUVA lentigo  • Similar to ink spot lentigo but follows photochemotherapy (PUVA)   • Location anywhere exposed to PUVA  Tanning bed lentigo  • Similar to ink spot lentigo but follows indoor tanning   • Location anywhere exposed to tanning bed  Radiation lentigo  • Occurs in site of irradiation (accidental or therapeutic)   • Associated with late-stage radiation dermatitis: epidermal atrophy, subcutaneous fibrosis, keratosis, telangiectasias  Melanotic macule  • Mucosal surfaces or adjacent glabrous skin eg lip, vulva, penis, anus   • Light to dark brown   • Also called mucosal melanosis  Generalised lentigines  • Found on any exposed or covered site from early childhood   • Small macules may merge to form larger patches   • Not associated with a syndrome   • Also called lentigines profusa, multiple lentigines  Agminated lentigines  • Naevoid eruption of lentigos confined to a single segmental area   • Sharp demarcation in midline   • May have associated neurological and developmental abnormalities  Patterned lentigines  • Inherited tendency to lentigines on face, lips, buttocks, palms, soles   • Recognised mainly in people of  ethnicity  Centrofacial neurodysraphic lentiginosis  Associated with mental retardation  Lentiginosis syndromes  • Syndromes include LEOPARD/Odalys, Peutz-Jeghers, Laugier-Hunziker, Moynahan, Xeroderma pigmentosum, myxoma syndromes (MARIO, NAME, Mares), Ruvalcaba-Myhre-Murphy, Bannayan-Zonnana syndrome, Cowden disease (multiple hamartoma syndrome )   • Inheritance is autosomal dominant; sporadic cases common   • Widespread lentigines present at birth or arise in early childhood   • Associated with neural, endocrine, and mesenchymal tumors    How is the diagnosis made? Lentigines are usually diagnosed clinically by their typical appearance  Concern regarding possibility of melanoma may lead to:  • Dermatoscopy   • Diagnostic excision biopsy    Histopathology of a lentigo shows:  • Thickened epidermis   • An increased number of melanocytes along the basal layer of epidermis   • Unlike junctional melanocytic naevus, there are no nests of melanocytes   • Increased melanin pigment within the keratinocytes   • Additional features depending on type of lentigo    In contrast, an ephelis (freckle) shows sun-induced increased melanin within the keratinocytes, without an increase in number of cells  What is the treatment for lentigines? Most lentigines are left alone  Attempts to lighten them may not be successful  The following approaches are used:  • SPF 50+ broad-spectrum sunscreen   • Hydroquinone bleaching cream   • Alpha hydroxy acids   • Vitamin C   • Retinoids   • Azelaic acid   • Chemical peels  Individual lesions can be permanently removed using:  • Cryotherapy   • Intense pulsed light   • Pigment lasers    How can lentigines be prevented? Lentigines associated with exposure ultraviolet radiation can be prevented by very careful sun protection  Clothing is more successful at preventing new lentigines than are sunscreens  What is the outlook for lentigines? Lentigines usually persist  They may increase in number with age and sun exposure  Some in sun-protected sites may fade and disappear    SEBORRHEIC KERATOSIS; NON-INFLAMED    Physical Exam:  • Anatomic Location Affected:  Scalp, trunk, hands   • Morphological Description:  Flat and raised, waxy, smooth to warty textured, yellow to brownish-grey to dark brown to blackish, discrete, "stuck-on" appearing papules  • Pertinent Positives:  • Pertinent Negatives: Additional History of Present Condition:  Patient reports new bumps on the skin  Denies itch, burn, pain, bleeding or ulceration  Present constantly; nothing seems to make it worse or better  No prior treatment  Assessment and Plan:  Based on a thorough discussion of this condition and the management approach to it (including a comprehensive discussion of the known risks, side effects and potential benefits of treatment), the patient (family) agrees to implement the following specific plan:  • Reassured benign   • Removal discussed if becomes bothersome $150 for up to 10 lesions (cosmetic)     Seborrheic Keratosis  A seborrheic keratosis is a harmless warty spot that appears during adult life as a common sign of skin aging  Seborrheic keratoses can arise on any area of skin, covered or uncovered, with the usual exception of the palms and soles  They do not arise from mucous membranes  Seborrheic keratoses can have highly variable appearance  Seborrheic keratoses are extremely common  It has been estimated that over 90% of adults over the age of 61 years have one or more of them  They occur in males and females of all races, typically beginning to erupt in the 35s or 45s  They are uncommon under the age of 21 years  The precise cause of seborrhoeic keratoses is not known  Seborrhoeic keratoses are considered degenerative in nature  As time goes by, seborrheic keratoses tend to become more numerous  Some people inherit a tendency to develop a very large number of them; some people may have hundreds of them  The name "seborrheic keratosis" is misleading, because these lesions are not limited to a seborrhoeic distribution (scalp, mid-face, chest, upper back), nor are they formed from sebaceous glands, nor are they associated with sebum -- which is greasy    Seborrheic keratosis may also be called "SK," "Abilio K," "basal cell papilloma," "senile wart," or "barnacle "      Researchers have noted:  • Eruptive seborrhoeic keratoses can follow sunburn or dermatitis  • Skin friction may be the reason they appear in body folds  • Viral cause (e g , human papillomavirus) seems unlikely  • Stable and clonal mutations or activation of FRFR3, PIK3CA, ANT, AKT1 and EGFR genes are found in seborrhoeic keratoses  • Seborrhoeic keratosis can arise from solar lentigo  • FRFR3 mutations also arise in solar lentigines  These mutations are associated with increased age and location on the head and neck, suggesting a role of ultraviolet radiation in these lesions  • Seborrheic keratoses do not harbour tumour suppressor gene mutations  • Epidermal growth factor receptor inhibitors, which are used to treat some cancers, often result in an increase in verrucal (warty) keratoses  There is no easy way to remove multiple lesions on a single occasion  Unless a specific lesion is "inflamed" and is causing pain or stinging/burning or is bleeding, most insurance companies do not authorize treatment  BARFIELD ANGIOMAS    Physical Exam:  • Anatomic Location Affected:  Trunk, legs   • Morphological Description:  Scattered cherry red, 1-4 mm papules  • Pertinent Positives:  • Pertinent Negatives: Additional History of Present Condition:  Here for skin exam     Assessment and Plan:  Based on a thorough discussion of this condition and the management approach to it (including a comprehensive discussion of the known risks, side effects and potential benefits of treatment), the patient (family) agrees to implement the following specific plan:  • Reassured benign   • Removal discussed if becomes bothersome $150 for up to 10 lesions (cosmetic)     Assessment and Plan:    Cherry angioma, also known as [employer] spots, are benign vascular skin lesions  A "cherry angioma" is a firm red, blue or purple papule, 0 1-1 cm in diameter   When thrombosed, they can appear black in colour until evaluated with a dermatoscope when the red or purple colour is more easily seen  Cherry angioma may develop on any part of the body but most often appear on the scalp, face, lips and trunk  An angioma is due to proliferating endothelial cells; these are the cells that line the inside of a blood vessel  Angiomas can arise in early life or later in life; the most common type of angioma is a cherry angioma  Cherry angiomas are very common in males and females of any age or race  They are more noticeable in white skin than in skin of colour  They markedly increase in number from about the age of 36  There may be a family history of similar lesions  Eruptive cherry angiomas have been rarely reported to be associated with internal malignancy  The cause of angiomas is unknown  Genetic analysis of cherry angiomas has shown that they frequently carry specific somatic missense mutations in the GNAQ and GNA11 (Q209H) genes, which are involved in other vascular and melanocytic proliferations  Cherry angioma is usually diagnosed clinically and no investigations are necessary for the majority of lesions  It has a characteristic red-clod or lobular pattern on dermatoscopy (called lacunar pattern using conventional pattern analysis)  When there is uncertainty about the diagnosis, a biopsy may be performed  The angioma is composed of venules in a thickened papillary dermis  Collagen bundles may be prominent between the lobules  Cherry angiomas are harmless, so they do not usually have to be treated  Occasionally, they are removed to exclude a malignant skin lesion such as a nodular melanoma or because they are irritated or bleeding (and a subsequent risk for infection)  To decrease friction over the lesions, we recommend Neutrogena Daily Defense SPF 50+ at least 3 times a day      PALMAR PITS   Physical Exam:  • Anatomic Location Affected:  Bilateral hands   • Morphological Description:  Small keratotic papules   • Pertinent Positives:  • Pertinent Negatives:     Additional History of Present Condition:  Present for years     Assessment and Plan:  Based on a thorough discussion of this condition and the management approach to it (including a comprehensive discussion of the known risks, side effects and potential benefits of treatment), the patient (family) agrees to implement the following specific plan:  • Reassured benign         Scribe Attestation    I,:   am acting as a scribe while in the presence of the attending physician :       I,:   personally performed the services described in this documentation    as scribed in my presence :

## 2023-04-02 ENCOUNTER — APPOINTMENT (EMERGENCY)
Dept: RADIOLOGY | Facility: HOSPITAL | Age: 83
End: 2023-04-02

## 2023-04-02 ENCOUNTER — HOSPITAL ENCOUNTER (EMERGENCY)
Facility: HOSPITAL | Age: 83
Discharge: HOME | End: 2023-04-02
Attending: EMERGENCY MEDICINE

## 2023-04-02 VITALS
HEART RATE: 57 BPM | TEMPERATURE: 97.6 F | SYSTOLIC BLOOD PRESSURE: 145 MMHG | DIASTOLIC BLOOD PRESSURE: 69 MMHG | RESPIRATION RATE: 19 BRPM | OXYGEN SATURATION: 96 %

## 2023-04-02 DIAGNOSIS — R07.9 CHEST PAIN: Primary | ICD-10-CM

## 2023-04-02 LAB
2HR DELTA HS TROPONIN: 1 NG/L
ALBUMIN SERPL BCP-MCNC: 4 G/DL (ref 3.5–5)
ALP SERPL-CCNC: 58 U/L (ref 34–104)
ALT SERPL W P-5'-P-CCNC: 9 U/L (ref 7–52)
ANION GAP SERPL CALCULATED.3IONS-SCNC: 5 MMOL/L (ref 4–13)
APTT PPP: 33 SECONDS (ref 23–37)
AST SERPL W P-5'-P-CCNC: 13 U/L (ref 13–39)
BASOPHILS # BLD AUTO: 0.05 THOUSANDS/ÂΜL (ref 0–0.1)
BASOPHILS NFR BLD AUTO: 1 % (ref 0–1)
BILIRUB SERPL-MCNC: 0.45 MG/DL (ref 0.2–1)
BNP SERPL-MCNC: 35 PG/ML (ref 0–100)
BUN SERPL-MCNC: 23 MG/DL (ref 5–25)
CALCIUM SERPL-MCNC: 9.5 MG/DL (ref 8.4–10.2)
CARDIAC TROPONIN I PNL SERPL HS: 10 NG/L
CARDIAC TROPONIN I PNL SERPL HS: 9 NG/L
CHLORIDE SERPL-SCNC: 102 MMOL/L (ref 96–108)
CO2 SERPL-SCNC: 31 MMOL/L (ref 21–32)
CREAT SERPL-MCNC: 1.02 MG/DL (ref 0.6–1.3)
EOSINOPHIL # BLD AUTO: 0.21 THOUSAND/ÂΜL (ref 0–0.61)
EOSINOPHIL NFR BLD AUTO: 3 % (ref 0–6)
ERYTHROCYTE [DISTWIDTH] IN BLOOD BY AUTOMATED COUNT: 13.9 % (ref 11.6–15.1)
GFR SERPL CREATININE-BSD FRML MDRD: 50 ML/MIN/1.73SQ M
GLUCOSE SERPL-MCNC: 121 MG/DL (ref 65–140)
HCT VFR BLD AUTO: 39.7 % (ref 34.8–46.1)
HGB BLD-MCNC: 12.6 G/DL (ref 11.5–15.4)
IMM GRANULOCYTES # BLD AUTO: 0.03 THOUSAND/UL (ref 0–0.2)
IMM GRANULOCYTES NFR BLD AUTO: 0 % (ref 0–2)
INR PPP: 1.02 (ref 0.84–1.19)
LYMPHOCYTES # BLD AUTO: 3.04 THOUSANDS/ÂΜL (ref 0.6–4.47)
LYMPHOCYTES NFR BLD AUTO: 38 % (ref 14–44)
MCH RBC QN AUTO: 30.4 PG (ref 26.8–34.3)
MCHC RBC AUTO-ENTMCNC: 31.7 G/DL (ref 31.4–37.4)
MCV RBC AUTO: 96 FL (ref 82–98)
MONOCYTES # BLD AUTO: 0.65 THOUSAND/ÂΜL (ref 0.17–1.22)
MONOCYTES NFR BLD AUTO: 8 % (ref 4–12)
NEUTROPHILS # BLD AUTO: 3.98 THOUSANDS/ÂΜL (ref 1.85–7.62)
NEUTS SEG NFR BLD AUTO: 50 % (ref 43–75)
NRBC BLD AUTO-RTO: 0 /100 WBCS
PLATELET # BLD AUTO: 183 THOUSANDS/UL (ref 149–390)
PMV BLD AUTO: 9.7 FL (ref 8.9–12.7)
POTASSIUM SERPL-SCNC: 4.4 MMOL/L (ref 3.5–5.3)
PROT SERPL-MCNC: 7.2 G/DL (ref 6.4–8.4)
PROTHROMBIN TIME: 13.5 SECONDS (ref 11.6–14.5)
RBC # BLD AUTO: 4.14 MILLION/UL (ref 3.81–5.12)
SODIUM SERPL-SCNC: 138 MMOL/L (ref 135–147)
WBC # BLD AUTO: 7.96 THOUSAND/UL (ref 4.31–10.16)

## 2023-04-02 NOTE — ED PROVIDER NOTES
History  Chief Complaint   Patient presents with   • Chest Pain     CHEST PAIN FOR ABOUT AN HOUR  STARTED WHILE SITTING AT HER DESK, TOOK ONE NITRO WITH SOME RELIEF  NO TROUBLE BREATHING     Patient states she was at rest approximately 1 hour prior to arrival when she noted sudden onset of pain which she describes as an ache in between her shoulder blades radiating up to the back of the neck  Is not associated with diaphoresis or shortness of breath  No nausea or vomiting  Patient states it felt different than back pain she had before  She tried stretching and other maneuvers which did not relieve the pain  Patient took a nitroglycerin which was about 10 months old, and felt relief within a few minutes  Patient did not have any pain in the anterior aspect of the chest   She does have a history of coronary artery disease with angioplasty x2  Patient took her aspirin today          Prior to Admission Medications   Prescriptions Last Dose Informant Patient Reported? Taking?    Biotin 2500 MCG CAPS   Yes No   Sig: Take by mouth daily   Cholecalciferol (VITAMIN D-3 PO)   Yes No   Sig: Take by mouth daily   Cholecalciferol (Vitamin D3) 879263 UNIT/GM POWD   Yes No   Sig: Take 50 mcg by mouth daily   Saccharomyces boulardii (Probiotic) 250 MG CAPS   Yes No   Sig: Take by mouth   Synthroid 137 MCG tablet   Yes No   Sig: Take 137 mcg by mouth daily   Vascepa 1 g CAPS   Yes No   Sig: TAKE 2 CAPSULES BY ORAL ROUTE 2 TIMES EVERY DAY WITH FOOD SWALLOWING WHOLE  DO NOT CHEW, OPEN, DISSOLVE AND/OR CRUSH    ammonium lactate (LAC-HYDRIN) 12 % cream   No No   Sig: Apply topically as needed for dry skin   diclofenac sodium (VOLTAREN) 1 %   No No   Sig: Apply 2 g topically 4 (four) times a day   gatifloxacin (ZYMAXID) 0 5 %   No No   Sig: Administer 1 drop into the left eye 2 (two) times a day   Patient not taking: Reported on 2/23/2023   hydrochlorothiazide (HYDRODIURIL) 12 5 mg tablet   Yes No   Sig: Take 12 5 mg by mouth daily ketorolac (ACULAR) 0 5 % ophthalmic solution   No No   Sig: Administer 1 drop into the left eye 4 (four) times a day   Patient not taking: Reported on 2/23/2023   levothyroxine 100 mcg tablet   Yes No   Sig: Take 137 mcg by mouth daily   metoprolol succinate (TOPROL-XL) 25 mg 24 hr tablet   Yes No   Sig: Take 25 mg by mouth daily   nitroglycerin (NITROSTAT) 0 4 mg SL tablet   Yes No   Sig: PLACE ONE TABLET UNDER THE TONGUE AS DIRECTED   ondansetron (ZOFRAN) 4 mg tablet   No No   Sig: Take 1 tablet (4 mg total) by mouth every 8 (eight) hours as needed for nausea or vomiting for up to 10 doses  simvastatin (ZOCOR) 20 mg tablet   Yes No   Sig: Take 20 mg by mouth daily at bedtime  valsartan (DIOVAN) 160 mg tablet   Yes No   Sig: Take 160 mg by mouth daily      Facility-Administered Medications: None       Past Medical History:   Diagnosis Date   • Coronary artery disease    • Hyperlipidemia    • Hypertension    • Hypothyroidism    • MI, old    • UTI (urinary tract infection) 07/26/2022       Past Surgical History:   Procedure Laterality Date   • CHOLECYSTECTOMY     • CORONARY ANGIOPLASTY      2001    • OH XCAPSL CTRC RMVL INSJ IO LENS PROSTH W/O ECP Right 8/1/2022    Procedure: EXTRACTION EXTRACAPSULAR CATARACT PHACO INTRAOCULAR LENS (IOL); Surgeon: Antonio Juarez MD;  Location: [facility] OR;  Service: Ophthalmology   • OH XCAPSL CTRC RMVL INSJ IO LENS PROSTH W/O ECP Left 10/17/2022    Procedure: EXTRACTION EXTRACAPSULAR CATARACT PHACO INTRAOCULAR LENS (IOL);   Surgeon: Antonio Juarez MD;  Location: [facility] OR;  Service: Ophthalmology   • TONSILLECTOMY         Family History   Problem Relation Age of Onset   • Lung cancer Mother    • Cirrhosis Father    • Heart attack Sister    • No Known Problems Daughter    • No Known Problems Sister    • No Known Problems Sister    • No Known Problems Daughter    • No Known Problems Daughter    • No Known Problems Maternal Aunt    • No Known Problems Maternal Aunt    • No Known Problems Maternal Aunt    • No Known Problems Maternal Aunt    • No Known Problems Maternal Aunt      I have reviewed and agree with the history as documented  E-Cigarette/Vaping   • E-Cigarette Use Never User      E-Cigarette/Vaping Substances     Social History     Tobacco Use   • Smoking status: Never   • Smokeless tobacco: Never   Vaping Use   • Vaping Use: Never used   Substance Use Topics   • Alcohol use: No   • Drug use: No       Review of Systems   Constitutional: Negative for chills, diaphoresis and fever  HENT: Negative for congestion and sore throat  Eyes: Negative for visual disturbance  Respiratory: Negative for cough and shortness of breath  Cardiovascular: Negative for chest pain  Gastrointestinal: Negative for abdominal pain and vomiting  Genitourinary: Negative for dysuria  Musculoskeletal: Positive for back pain and neck pain  Skin: Negative for rash  Neurological: Negative for dizziness, light-headedness and headaches  Hematological: Does not bruise/bleed easily  Psychiatric/Behavioral: Negative for confusion  All other systems reviewed and are negative  Physical Exam  Physical Exam  Vitals and nursing note reviewed  Constitutional:       Appearance: She is well-developed  HENT:      Head: Normocephalic  Eyes:      Pupils: Pupils are equal, round, and reactive to light  Cardiovascular:      Rate and Rhythm: Normal rate and regular rhythm  Heart sounds: Normal heart sounds  Pulmonary:      Effort: Pulmonary effort is normal       Breath sounds: Normal breath sounds  Chest:      Chest wall: No tenderness  Abdominal:      Palpations: Abdomen is soft  Tenderness: There is no abdominal tenderness  Musculoskeletal:      Cervical back: Normal range of motion and neck supple  Right lower leg: No tenderness  Edema present  Left lower leg: No tenderness  Edema present  Skin:     General: Skin is warm and dry        Capillary Refill: Capillary refill takes less than 2 seconds  Neurological:      General: No focal deficit present  Mental Status: She is alert     Psychiatric:         Mood and Affect: Mood normal          Behavior: Behavior normal          Vital Signs  ED Triage Vitals [04/02/23 1324]   Temperature Pulse Respirations Blood Pressure SpO2   97 6 °F (36 4 °C) 69 20 168/76 98 %      Temp Source Heart Rate Source Patient Position - Orthostatic VS BP Location FiO2 (%)   Tympanic Monitor Sitting Left arm --      Pain Score       3           Vitals:    04/02/23 1530 04/02/23 1615 04/02/23 1700 04/02/23 1730   BP: 131/61 155/67 158/67 141/65   Pulse: 61 60 59 55   Patient Position - Orthostatic VS: Lying Lying Lying Lying         Visual Acuity      ED Medications  Medications - No data to display    Diagnostic Studies  Results Reviewed     Procedure Component Value Units Date/Time    HS Troponin I 2hr [[id number]]  (Normal) Collected: 04/02/23 1625    Lab Status: Final result Specimen: Blood from Arm, Right Updated: 04/02/23 1659     hs TnI 2hr 10 ng/L      Delta 2hr hsTnI 1 ng/L     HS Troponin I 4hr [[id number]]     Lab Status: No result Specimen: Blood     Protime-INR [[id number]]  (Normal) Collected: 04/02/23 1406    Lab Status: Final result Specimen: Blood from Arm, Right Updated: 04/02/23 1453     Protime 13 5 seconds      INR 1 02    APTT [[id number]]  (Normal) Collected: 04/02/23 1406    Lab Status: Final result Specimen: Blood from Arm, Right Updated: 04/02/23 1453     PTT 33 seconds     HS Troponin 0hr (reflex protocol) [[id number]]  (Normal) Collected: 04/02/23 1406    Lab Status: Final result Specimen: Blood from Arm, Right Updated: 04/02/23 1435     hs TnI 0hr 9 ng/L     B-Type Natriuretic Peptide(BNP) [[id number]]  (Normal) Collected: 04/02/23 1406    Lab Status: Final result Specimen: Blood from Arm, Right Updated: 04/02/23 1435     BNP 35 pg/mL     Comprehensive metabolic panel [[id number]] Collected: 04/02/23 1406 Lab Status: Final result Specimen: Blood from Arm, Right Updated: 04/02/23 1427     Sodium 138 mmol/L      Potassium 4 4 mmol/L      Chloride 102 mmol/L      CO2 31 mmol/L      ANION GAP 5 mmol/L      BUN 23 mg/dL      Creatinine 1 02 mg/dL      Glucose 121 mg/dL      Calcium 9 5 mg/dL      AST 13 U/L      ALT 9 U/L      Alkaline Phosphatase 58 U/L      Total Protein 7 2 g/dL      Albumin 4 0 g/dL      Total Bilirubin 0 45 mg/dL      eGFR 50 ml/min/1 73sq m     Narrative:      [employer] guidelines for Chronic Kidney Disease (CKD):   •  Stage 1 with normal or high GFR (GFR > 90 mL/min/1 73 square meters)  •  Stage 2 Mild CKD (GFR = 60-89 mL/min/1 73 square meters)  •  Stage 3A Moderate CKD (GFR = 45-59 mL/min/1 73 square meters)  •  Stage 3B Moderate CKD (GFR = 30-44 mL/min/1 73 square meters)  •  Stage 4 Severe CKD (GFR = 15-29 mL/min/1 73 square meters)  •  Stage 5 End Stage CKD (GFR <15 mL/min/1 73 square meters)  Note: GFR calculation is accurate only with a steady state creatinine    CBC and differential [[id number]] Collected: 04/02/23 1406    Lab Status: Final result Specimen: Blood from Arm, Right Updated: 04/02/23 1411     WBC 7 96 Thousand/uL      RBC 4 14 Million/uL      Hemoglobin 12 6 g/dL      Hematocrit 39 7 %      MCV 96 fL      MCH 30 4 pg      MCHC 31 7 g/dL      RDW 13 9 %      MPV 9 7 fL      Platelets 153 Thousands/uL      nRBC 0 /100 WBCs      Neutrophils Relative 50 %      Immat GRANS % 0 %      Lymphocytes Relative 38 %      Monocytes Relative 8 %      Eosinophils Relative 3 %      Basophils Relative 1 %      Neutrophils Absolute 3 98 Thousands/µL      Immature Grans Absolute 0 03 Thousand/uL      Lymphocytes Absolute 3 04 Thousands/µL      Monocytes Absolute 0 65 Thousand/µL      Eosinophils Absolute 0 21 Thousand/µL      Basophils Absolute 0 05 Thousands/µL                  XR chest 1 view portable    (Results Pending)              Procedures  ECG 12 Lead Documentation Only    Date/Time: 4/2/2023 1:32 PM  Performed by: Parveen Smith MD  Authorized by: Parveen Smith MD     Indications / Diagnosis:  Thoracic pain  ECG reviewed by me, the ED Provider: yes    Patient location:  ED  Interpretation:     Interpretation: non-specific    Rate:     ECG rate:  59    ECG rate assessment: bradycardic    Rhythm:     Rhythm: sinus rhythm    Ectopy:     Ectopy: none    QRS:     QRS axis:  Normal    QRS intervals:  Normal  Conduction:     Conduction: normal    ST segments:     ST segments:  Normal  T waves:     T waves: normal    Other findings:     Other findings: LVH               ED Course             HEART Risk Score    Flowsheet Row Most Recent Value   Heart Score Risk Calculator    History 1 Filed at: 04/02/2023 1801   ECG 0 Filed at: 04/02/2023 1801   Age 2 Filed at: 04/02/2023 1801   Risk Factors 2 Filed at: 04/02/2023 1801   Troponin 0 Filed at: 04/02/2023 1801   HEART Score 5 Filed at: 04/02/2023 1801                        SBIRT 22yo+    Flowsheet Row Most Recent Value   SBIRT (25 yo +)    In order to provide better care to our patients, we are screening all of our patients for alcohol and drug use  Would it be okay to ask you these screening questions? No Filed at: 04/02/2023 1331                    Medical Decision Making  Posterior thoracic pain relieved with nitroglycerin  Patient has a history of coronary artery disease but states that her anginal symptoms have always been anterior  She is pain-free at the time of examination  We will check cardiac profile    Amount and/or Complexity of Data Reviewed  Labs: ordered  Radiology: ordered            Disposition  Final diagnoses:   Chest pain     Time reflects when diagnosis was documented in both MDM as applicable and the Disposition within this note     Time User Action Codes Description Comment    4/2/2023  6:01 PM Alayne Knife Add [R07 9] Chest pain       ED Disposition     ED Disposition   Discharge Condition   Stable    Date/Time   Sun Apr 2, 2023  6:01 PM    Comment   Jada Spencer discharge to home/self care  Follow-up Information     Follow up With Specialties Details Why [address], [address] [address]  [phone]      Evelyn Tapia MD Cardiology Schedule an appointment as soon as possible for a visit   [address] [place] [address]  [phone]            Patient's Medications   Discharge Prescriptions    No medications on file       No discharge procedures on file      PDMP Review     None          ED Provider  Electronically Signed by           Ramona Kraft MD  04/02/23 8007

## 2023-04-03 LAB
ATRIAL RATE: 59 BPM
P AXIS: 26 DEGREES
PR INTERVAL: 206 MS
QRS AXIS: -14 DEGREES
QRSD INTERVAL: 84 MS
QT INTERVAL: 400 MS
QTC INTERVAL: 396 MS
T WAVE AXIS: 52 DEGREES
VENTRICULAR RATE: 59 BPM

## 2023-04-28 ENCOUNTER — APPOINTMENT (OUTPATIENT)
Dept: LAB | Facility: CLINIC | Age: 83
End: 2023-04-28

## 2023-06-07 ENCOUNTER — APPOINTMENT (OUTPATIENT)
Dept: LAB | Facility: HOSPITAL | Age: 83
End: 2023-06-07
Payer: MEDICARE

## 2023-06-07 ENCOUNTER — HOSPITAL ENCOUNTER (OUTPATIENT)
Dept: RADIOLOGY | Facility: HOSPITAL | Age: 83
Discharge: HOME | End: 2023-06-07
Payer: MEDICARE

## 2023-06-07 DIAGNOSIS — E78.1 PURE HYPERGLYCERIDEMIA: ICD-10-CM

## 2023-06-07 DIAGNOSIS — Z79.899 ENCOUNTER FOR LONG-TERM (CURRENT) USE OF OTHER MEDICATIONS: ICD-10-CM

## 2023-06-07 DIAGNOSIS — E78.00 PURE HYPERCHOLESTEROLEMIA: ICD-10-CM

## 2023-06-07 DIAGNOSIS — R10.9 ABDOMINAL PAIN, UNSPECIFIED ABDOMINAL LOCATION: ICD-10-CM

## 2023-06-07 LAB
ALBUMIN SERPL BCP-MCNC: 4.2 G/DL (ref 3.5–5)
ALP SERPL-CCNC: 56 U/L (ref 34–104)
ALT SERPL W P-5'-P-CCNC: 11 U/L (ref 7–52)
AMYLASE SERPL-CCNC: 34 IU/L (ref 29–103)
ANION GAP SERPL CALCULATED.3IONS-SCNC: 8 MMOL/L (ref 4–13)
AST SERPL W P-5'-P-CCNC: 18 U/L (ref 13–39)
BILIRUB DIRECT SERPL-MCNC: 0.09 MG/DL (ref 0–0.2)
BILIRUB SERPL-MCNC: 0.54 MG/DL (ref 0.2–1)
BUN SERPL-MCNC: 18 MG/DL (ref 5–25)
CALCIUM SERPL-MCNC: 9.2 MG/DL (ref 8.4–10.2)
CHLORIDE SERPL-SCNC: 101 MMOL/L (ref 96–108)
CHOLEST SERPL-MCNC: 173 MG/DL
CK SERPL-CCNC: 87 U/L (ref 26–192)
CO2 SERPL-SCNC: 28 MMOL/L (ref 21–32)
CREAT SERPL-MCNC: 1.06 MG/DL (ref 0.6–1.3)
GFR SERPL CREATININE-BSD FRML MDRD: 48 ML/MIN/1.73SQ M
GLUCOSE P FAST SERPL-MCNC: 132 MG/DL (ref 65–99)
HDLC SERPL-MCNC: 34 MG/DL
LDLC SERPL CALC-MCNC: 84 MG/DL (ref 0–100)
LIPASE SERPL-CCNC: 32 U/L (ref 11–82)
MAGNESIUM SERPL-MCNC: 1.9 MG/DL (ref 1.9–2.7)
NONHDLC SERPL-MCNC: 139 MG/DL
POTASSIUM SERPL-SCNC: 4.1 MMOL/L (ref 3.5–5.3)
PROT SERPL-MCNC: 7.5 G/DL (ref 6.4–8.4)
SODIUM SERPL-SCNC: 137 MMOL/L (ref 135–147)
TRIGL SERPL-MCNC: 275 MG/DL

## 2023-06-07 PROCEDURE — 80061 LIPID PANEL: CPT

## 2023-06-07 PROCEDURE — 36415 COLL VENOUS BLD VENIPUNCTURE: CPT

## 2023-06-07 PROCEDURE — 74018 RADEX ABDOMEN 1 VIEW: CPT

## 2023-06-07 PROCEDURE — 80076 HEPATIC FUNCTION PANEL: CPT

## 2023-06-07 PROCEDURE — 83690 ASSAY OF LIPASE: CPT

## 2023-06-07 PROCEDURE — 82150 ASSAY OF AMYLASE: CPT

## 2023-06-07 PROCEDURE — 80048 BASIC METABOLIC PNL TOTAL CA: CPT

## 2023-06-07 PROCEDURE — 83735 ASSAY OF MAGNESIUM: CPT

## 2023-06-07 PROCEDURE — 82550 ASSAY OF CK (CPK): CPT

## 2023-10-11 ENCOUNTER — OFFICE VISIT (OUTPATIENT)
Dept: FAMILY MEDICINE CLINIC | Facility: CLINIC | Age: 83
End: 2023-10-11
Payer: MEDICARE

## 2023-10-11 VITALS
BODY MASS INDEX: 32.09 KG/M2 | RESPIRATION RATE: 18 BRPM | TEMPERATURE: 97.4 F | DIASTOLIC BLOOD PRESSURE: 68 MMHG | WEIGHT: 174.4 LBS | HEIGHT: 62 IN | HEART RATE: 64 BPM | SYSTOLIC BLOOD PRESSURE: 134 MMHG

## 2023-10-11 DIAGNOSIS — Z12.39 SCREENING BREAST EXAMINATION: ICD-10-CM

## 2023-10-11 DIAGNOSIS — Z12.31 ENCOUNTER FOR SCREENING MAMMOGRAM FOR MALIGNANT NEOPLASM OF BREAST: ICD-10-CM

## 2023-10-11 DIAGNOSIS — Z00.00 MEDICARE ANNUAL WELLNESS VISIT, SUBSEQUENT: Primary | ICD-10-CM

## 2023-10-11 DIAGNOSIS — E78.2 MIXED HYPERLIPIDEMIA: ICD-10-CM

## 2023-10-11 DIAGNOSIS — I10 PRIMARY HYPERTENSION: ICD-10-CM

## 2023-10-11 DIAGNOSIS — R73.09 ELEVATED GLUCOSE: ICD-10-CM

## 2023-10-11 DIAGNOSIS — I25.10 CORONARY ARTERY DISEASE INVOLVING NATIVE CORONARY ARTERY OF NATIVE HEART WITHOUT ANGINA PECTORIS: ICD-10-CM

## 2023-10-11 DIAGNOSIS — E03.9 HYPOTHYROIDISM, UNSPECIFIED TYPE: ICD-10-CM

## 2023-10-11 PROBLEM — L60.0 INGROWN TOENAIL: Status: RESOLVED | Noted: 2018-08-21 | Resolved: 2023-10-11

## 2023-10-11 PROBLEM — S91.109A OPEN WOUND OF TOE: Status: RESOLVED | Noted: 2018-09-26 | Resolved: 2023-10-11

## 2023-10-11 PROBLEM — M79.671 RIGHT FOOT PAIN: Status: RESOLVED | Noted: 2018-08-21 | Resolved: 2023-10-11

## 2023-10-11 PROBLEM — L03.031 PARONYCHIA OF TOENAIL OF RIGHT FOOT: Status: RESOLVED | Noted: 2018-08-21 | Resolved: 2023-10-11

## 2023-10-11 PROCEDURE — G0439 PPPS, SUBSEQ VISIT: HCPCS | Performed by: INTERNAL MEDICINE

## 2023-10-11 PROCEDURE — 99204 OFFICE O/P NEW MOD 45 MIN: CPT | Performed by: INTERNAL MEDICINE

## 2023-10-11 RX ORDER — METOPROLOL SUCCINATE 25 MG/1
25 TABLET, EXTENDED RELEASE ORAL DAILY
Qty: 90 TABLET | Refills: 3 | Status: SHIPPED | OUTPATIENT
Start: 2023-10-11

## 2023-10-11 RX ORDER — VALSARTAN 160 MG/1
160 TABLET ORAL DAILY
Qty: 90 TABLET | Refills: 3 | Status: SHIPPED | OUTPATIENT
Start: 2023-10-11

## 2023-10-11 NOTE — PROGRESS NOTES
Assessment and Plan:     Problem List Items Addressed This Visit        Endocrine    Hypothyroidism     She is clinically euthyroid but labs were ordered to assess. For now we will continue levothyroxine as ordered and adjust dosing if necessary. Relevant Medications    metoprolol succinate (TOPROL-XL) 25 mg 24 hr tablet    Other Relevant Orders    TSH, 3rd generation with Free T4 reflex       Cardiovascular and Mediastinum    HTN (hypertension)     Well controlled on current doses of valsartan, metoprolol, hydrochlorothiazide and will continue as ordered. Encouraged exercise and weight loss. Relevant Medications    valsartan (DIOVAN) 160 mg tablet    metoprolol succinate (TOPROL-XL) 25 mg 24 hr tablet    Other Relevant Orders    CBC and differential    Comprehensive metabolic panel    Lipid panel    CAD (coronary artery disease)     She denies current issues. Continues to follow with Dr. Morteza Medina at [facility]. Relevant Medications    metoprolol succinate (TOPROL-XL) 25 mg 24 hr tablet       Other    Hyperlipidemia     Labs were ordered and she will continue vascepa and simvastatin. Encouraged low fat diet and exercise. Relevant Orders    CBC and differential    Comprehensive metabolic panel    Lipid panel   Other Visit Diagnoses     Medicare annual wellness visit, subsequent    -  Primary    Screening breast examination        Relevant Orders    Mammo screening bilateral w 3d & cad    Elevated glucose        Relevant Orders    HEMOGLOBIN A1C W/ EAG ESTIMATION    Encounter for screening mammogram for malignant neoplasm of breast        Relevant Orders    Mammo screening bilateral w 3d & cad          Depression Screening and Follow-up Plan: Patient was screened for depression during today's encounter. They screened negative with a PHQ-2 score of 0.       Preventive health issues were discussed with patient, and age appropriate screening tests were ordered as noted in patient's After Visit Summary. Personalized health advice and appropriate referrals for health education or preventive services given if needed, as noted in patient's After Visit Summary. History of Present Illness:     Patient presents for a Medicare Wellness Visit    NP, here to establish and for AWV. Has been sick for a week. Had a fever and a cough; this resolved, then had diarrhea for 2 more days. Tested positive for COVID. She is now feeling better, but still has the sniffles. She was on vacation at the time. MI around 2020, "slight,"  Had cath with angioplasty but no stent. Sees Dr. Jose Luis Rodrigues regularly. Has longstanding history of hypothyroidism. States levels have been stable and has not needed to change the dose recently. Denies chest pain, dyspnea, paroxysmal nocturnal dyspnea. Patient Care Team:  Sona Tinajero MD as PCP - General (Internal Medicine)  Otilia Cushing, MD     Review of Systems:     Review of Systems   Constitutional:  Negative for chills and fever. HENT:  Positive for congestion. Negative for ear pain and sore throat. Eyes:  Negative for pain and visual disturbance. Respiratory:  Negative for cough and shortness of breath. Cardiovascular:  Negative for chest pain and palpitations. Gastrointestinal:  Negative for abdominal pain and vomiting. Genitourinary:  Negative for dysuria and hematuria. Musculoskeletal:  Negative for arthralgias and back pain. Skin:  Negative for color change and rash. Neurological:  Negative for seizures and syncope. All other systems reviewed and are negative.        Problem List:     Patient Active Problem List   Diagnosis   • HTN (hypertension)   • Hyperlipidemia   • CAD (coronary artery disease)   • MI (myocardial infarction) ([address])   • Hypothyroidism      Past Medical and Surgical History:     Past Medical History:   Diagnosis Date   • Coronary artery disease    • Hyperlipidemia    • Hypertension    • Hypothyroidism    • MI, old    • UTI (urinary tract infection) 07/26/2022     Past Surgical History:   Procedure Laterality Date   • CHOLECYSTECTOMY     • CORONARY ANGIOPLASTY      2001    • WA XCAPSL CTRC RMVL INSJ IO LENS PROSTH W/O ECP Right 8/1/2022    Procedure: EXTRACTION EXTRACAPSULAR CATARACT PHACO INTRAOCULAR LENS (IOL); Surgeon: Annemarie Weinberg MD;  Location: [facility] MAIN OR;  Service: Ophthalmology   • WA XCAPSL CTRC RMVL INSJ IO LENS PROSTH W/O ECP Left 10/17/2022    Procedure: EXTRACTION EXTRACAPSULAR CATARACT PHACO INTRAOCULAR LENS (IOL); Surgeon: Annemarie Weinberg MD;  Location: [facility] MAIN OR;  Service: Ophthalmology   • TONSILLECTOMY        Family History:     Family History   Problem Relation Age of Onset   • Thyroid disease Mother    • Lung cancer Mother    • Cirrhosis Father    • Heart attack Sister    • No Known Problems Sister    • No Known Problems Sister    • No Known Problems Daughter    • No Known Problems Daughter    • No Known Problems Daughter    • No Known Problems Maternal Aunt    • No Known Problems Maternal Aunt    • No Known Problems Maternal Aunt    • No Known Problems Maternal Aunt    • No Known Problems Maternal Aunt       Social History:     Social History     Socioeconomic History   • Marital status:       Spouse name: None   • Number of children: None   • Years of education: None   • Highest education level: None   Occupational History   • None   Tobacco Use   • Smoking status: Never   • Smokeless tobacco: Never   Vaping Use   • Vaping Use: Never used   Substance and Sexual Activity   • Alcohol use: No   • Drug use: No   • Sexual activity: None   Other Topics Concern   • None   Social History Narrative   • None     Social Determinants of Health     Financial Resource Strain: Low Risk  (10/11/2023)    Overall Financial Resource Strain (CARDIA)    • Difficulty of Paying Living Expenses: Not hard at all   Food Insecurity: Not on file   Transportation Needs: No Transportation Needs (10/11/2023)    PRAPARE - Transportation • Lack of Transportation (Medical): No    • Lack of Transportation (Non-Medical): No   Physical Activity: Not on file   Stress: Not on file   Social Connections: Not on file   Intimate Partner Violence: Not on file   Housing Stability: Not on file      Medications and Allergies:     Current Outpatient Medications   Medication Sig Dispense Refill   • Biotin 2500 MCG CAPS Take by mouth daily     • Cholecalciferol (VITAMIN D-3 PO) Take by mouth daily     • Cholecalciferol (Vitamin D3) 249709 UNIT/GM POWD Take 50 mcg by mouth daily     • diclofenac sodium (VOLTAREN) 1 % Apply 2 g topically 4 (four) times a day 1 Tube 3   • hydrochlorothiazide (HYDRODIURIL) 12.5 mg tablet Take 12.5 mg by mouth daily     • metoprolol succinate (TOPROL-XL) 25 mg 24 hr tablet Take 1 tablet (25 mg total) by mouth daily 90 tablet 3   • nitroglycerin (NITROSTAT) 0.4 mg SL tablet if needed     • Saccharomyces boulardii (Probiotic) 250 MG CAPS Take by mouth     • simvastatin (ZOCOR) 20 mg tablet Take 20 mg by mouth daily at bedtime. • Synthroid 137 MCG tablet Take 137 mcg by mouth daily     • valsartan (DIOVAN) 160 mg tablet Take 1 tablet (160 mg total) by mouth daily 90 tablet 3   • Vascepa 1 g CAPS TAKE 2 CAPSULES BY ORAL ROUTE 2 TIMES EVERY DAY WITH FOOD SWALLOWING WHOLE. DO NOT CHEW, OPEN, DISSOLVE AND/OR CRUSH. • ammonium lactate (LAC-HYDRIN) 12 % cream Apply topically as needed for dry skin 385 g 0   • ondansetron (ZOFRAN) 4 mg tablet Take 1 tablet (4 mg total) by mouth every 8 (eight) hours as needed for nausea or vomiting for up to 10 doses. 10 tablet 0     No current facility-administered medications for this visit.      Allergies   Allergen Reactions   • Iodinated Contrast Media Hives   • Fenofibrate Hives      Immunizations:     Immunization History   Administered Date(s) Administered   • COVID-19 MODERNA VACC 0.5 ML IM 08/03/2021, 08/31/2021   • Influenza Split High Dose Preservative Free IM 09/28/2018      [facility] Maintenance: There are no preventive care reminders to display for this patient. Topic Date Due   • Pneumococcal Vaccine: 65+ Years (1 - PCV) Never done   • COVID-19 Vaccine (3 - Moderna series) 10/26/2021   • Influenza Vaccine (1) 09/01/2023      Medicare Screening Tests and Risk Assessments:     Sadie Baker is here for her Subsequent Wellness visit. Health Risk Assessment:   Patient rates overall health as good. Patient feels that their physical health rating is same. Patient is satisfied with their life. Eyesight was rated as same. Hearing was rated as same. Patient feels that their emotional and mental health rating is same. Patients states they are never, rarely angry. Patient states they are never, rarely unusually tired/fatigued. Pain experienced in the last 7 days has been none. Patient states that she has experienced weight loss or gain in last 6 months. Depression Screening:   PHQ-2 Score: 0      Fall Risk Screening: In the past year, patient has experienced: history of falling in past year    Number of falls: 1  Injured during fall?: Yes    Feels unsteady when standing or walking?: No    Worried about falling?: No      Urinary Incontinence Screening:   Patient has leaked urine accidently in the last six months. Home Safety:  Patient does not have trouble with stairs inside or outside of their home. Patient has working smoke alarms and has working carbon monoxide detector. Home safety hazards include: none. Nutrition:   Current diet is Regular. Medications:   Patient is currently taking over-the-counter supplements. OTC medications include: see medication list. Patient is able to manage medications. Activities of Daily Living (ADLs)/Instrumental Activities of Daily Living (IADLs):   Walk and transfer into and out of bed and chair?: Yes  Dress and groom yourself?: Yes    Bathe or shower yourself?: Yes    Feed yourself?  Yes  Do your laundry/housekeeping?: Yes  Manage your money, pay your bills and track your expenses?: Yes  Make your own meals?: Yes    Do your own shopping?: Yes    Previous Hospitalizations:   Any hospitalizations or ED visits within the last 12 months?: No      Advance Care Planning:   Living will: No    Durable POA for healthcare: No    Advanced directive: No      Comments: Declines information. Cognitive Screening:   Provider or family/friend/caregiver concerned regarding cognition?: No    PREVENTIVE SCREENINGS      Cardiovascular Screening:    General: Screening Not Indicated and History Lipid Disorder      Diabetes Screening:     General: Screening Current      Colorectal Cancer Screening:     General: Screening Not Indicated      Breast Cancer Screening:     General: Screening Current and Screening Not Indicated      Cervical Cancer Screening:    General: Screening Not Indicated      Osteoporosis Screening:    General: Screening Current      Abdominal Aortic Aneurysm (AAA) Screening:        General: Patient Declines      Lung Cancer Screening:     General: Screening Not Indicated      Hepatitis C Screening:    General: Risks and Benefits Discussed and Screening Not Indicated    Screening, Brief Intervention, and Referral to Treatment (SBIRT)    Screening  Typical number of drinks in a day: 0  Typical number of drinks in a week: 0  Interpretation: Low risk drinking behavior. Single Item Drug Screening:  How often have you used an illegal drug (including marijuana) or a prescription medication for non-medical reasons in the past year? never    Single Item Drug Screen Score: 0  Interpretation: Negative screen for possible drug use disorder    Brief Intervention  Alcohol & drug use screenings were reviewed. No concerns regarding substance use disorder identified. Other Counseling Topics:   Car/seat belt/driving safety, skin self-exam, sunscreen and calcium and vitamin D intake and regular weightbearing exercise. No results found.      Physical Exam:     BP 134/68   Pulse 64   Temp (!) 97.4 °F (36.3 °C)   Resp 18   Ht 5' 1.5" (1.562 m)   Wt 79.1 kg (174 lb 6.4 oz)   BMI 32.42 kg/m²     Physical Exam  Vitals and nursing note reviewed. Constitutional:       General: She is not in acute distress. Appearance: She is well-developed. HENT:      Head: Normocephalic and atraumatic. Eyes:      Conjunctiva/sclera: Conjunctivae normal.   Cardiovascular:      Rate and Rhythm: Normal rate and regular rhythm. Heart sounds: No murmur heard. Pulmonary:      Effort: Pulmonary effort is normal. No respiratory distress. Breath sounds: Normal breath sounds. Abdominal:      Palpations: Abdomen is soft. Tenderness: There is no abdominal tenderness. Musculoskeletal:         General: No swelling. Cervical back: Neck supple. Skin:     General: Skin is warm and dry. Capillary Refill: Capillary refill takes less than 2 seconds. Neurological:      Mental Status: She is alert.    Psychiatric:         Mood and Affect: Mood normal.          Betsy Carter MD

## 2023-10-11 NOTE — ASSESSMENT & PLAN NOTE
"PROGRESS NOTE    Subjective   Chief complaint: Brooke Lam is a 82 y.o. female who is a long term care patient being seen and evaluated for anxiety.    HPI:   Patient with increase in anxiety.  Patient has diagnosis of dementia and is confused.  Nurse reports that patient is exit seeking and yells repeatedly \"I don't want to be here.  I want to go home\".      Objective   Vital signs:   18, 149/71, 97.2, 78, 98%  Physical Exam  Constitutional:       General: She is not in acute distress.  Eyes:      Extraocular Movements: Extraocular movements intact.   Pulmonary:      Effort: Pulmonary effort is normal.   Musculoskeletal:      Cervical back: Neck supple.   Neurological:      Mental Status: She is alert.   Psychiatric:         Mood and Affect: Mood normal.         Behavior: Behavior is cooperative.         Assessment/Plan   Problem List Items Addressed This Visit       Anxiety - Primary     Ativan 0.25mg prn           Medications, treatments, and labs reviewed  Continue medications and treatments as listed in EMR    Scribe Attestation  INina Scribe   attest that this documentation has been prepared under the direction and in the presence of REMA Sesay    Provider Attestation - Scribe documentation  All medical record entries made by the Scribe were at my direction and personally dictated by me. I have reviewed the chart and agree that the record accurately reflects my personal performance of the history, physical exam, discussion and plan.   REMA Sesay      " Well controlled on current doses of valsartan, metoprolol, hydrochlorothiazide and will continue as ordered. Encouraged exercise and weight loss.

## 2023-10-11 NOTE — ASSESSMENT & PLAN NOTE
She denies current issues. Continues to follow with Dr. Gregorio Law at [facility].

## 2023-10-11 NOTE — ASSESSMENT & PLAN NOTE
She is clinically euthyroid but labs were ordered to assess. For now we will continue levothyroxine as ordered and adjust dosing if necessary.

## 2023-10-11 NOTE — PATIENT INSTRUCTIONS
Medicare Preventive Visit Patient Instructions  Thank you for completing your Welcome to Medicare Visit or Medicare Annual Wellness Visit today. Your next wellness visit will be due in one year (10/11/2024). The screening/preventive services that you may require over the next 5-10 years are detailed below. Some tests may not apply to you based off risk factors and/or age. Screening tests ordered at today's visit but not completed yet may show as past due. Also, please note that scanned in results may not display below. Preventive Screenings:  Service Recommendations Previous Testing/Comments   Colorectal Cancer Screening  * Colonoscopy    * Fecal Occult Blood Test (FOBT)/Fecal Immunochemical Test (FIT)  * Fecal DNA/Cologuard Test  * Flexible Sigmoidoscopy Age: 43-73 years old   Colonoscopy: every 10 years (may be performed more frequently if at higher risk)  OR  FOBT/FIT: every 1 year  OR  Cologuard: every 3 years  OR  Sigmoidoscopy: every 5 years  Screening may be recommended earlier than age 39 if at higher risk for colorectal cancer. Also, an individualized decision between you and your healthcare provider will decide whether screening between the ages of 77-80 would be appropriate. Colonoscopy: Not on file  FOBT/FIT: Not on file  Cologuard: Not on file  Sigmoidoscopy: Not on file          Breast Cancer Screening Age: 36 years old  Frequency: every 1-2 years  Not required if history of left and right mastectomy Mammogram: 07/29/2022    Screening Current   Cervical Cancer Screening Between the ages of 21-29, pap smear recommended once every 3 years. Between the ages of 32-69, can perform pap smear with HPV co-testing every 5 years.    Recommendations may differ for women with a history of total hysterectomy, cervical cancer, or abnormal pap smears in past. Pap Smear: Not on file    Screening Not Indicated   Hepatitis C Screening Once for adults born between 1945 and 1965  More frequently in patients at high risk for Hepatitis C Hep C Antibody: Not on file        Diabetes Screening 1-2 times per year if you're at risk for diabetes or have pre-diabetes Fasting glucose: 132 mg/dL (6/7/2023)  A1C: 6.5 % (4/28/2023)  Screening Current   Cholesterol Screening Once every 5 years if you don't have a lipid disorder. May order more often based on risk factors. Lipid panel: 06/07/2023    Screening Not Indicated  History Lipid Disorder     Other Preventive Screenings Covered by Medicare:  Abdominal Aortic Aneurysm (AAA) Screening: covered once if your at risk. You're considered to be at risk if you have a family history of AAA. Lung Cancer Screening: covers low dose CT scan once per year if you meet all of the following conditions: (1) Age 48-67; (2) No signs or symptoms of lung cancer; (3) Current smoker or have quit smoking within the last 15 years; (4) You have a tobacco smoking history of at least 20 pack years (packs per day multiplied by number of years you smoked); (5) You get a written order from a healthcare provider. Glaucoma Screening: covered annually if you're considered high risk: (1) You have diabetes OR (2) Family history of glaucoma OR (3)  aged 48 and older OR (3)  American aged 72 and older  Osteoporosis Screening: covered every 2 years if you meet one of the following conditions: (1) You're estrogen deficient and at risk for osteoporosis based off medical history and other findings; (2) Have a vertebral abnormality; (3) On glucocorticoid therapy for more than 3 months; (4) Have primary hyperparathyroidism; (5) On osteoporosis medications and need to assess response to drug therapy. Last bone density test (DXA Scan): 03/18/2021. HIV Screening: covered annually if you're between the age of 14-79. Also covered annually if you are younger than 13 and older than 72 with risk factors for HIV infection.  For pregnant patients, it is covered up to 3 times per pregnancy. Immunizations:  Immunization Recommendations   Influenza Vaccine Annual influenza vaccination during flu season is recommended for all persons aged >= 6 months who do not have contraindications   Pneumococcal Vaccine   * Pneumococcal conjugate vaccine = PCV13 (Prevnar 13), PCV15 (Vaxneuvance), PCV20 (Prevnar 20)  * Pneumococcal polysaccharide vaccine = PPSV23 (Pneumovax) Adults 13-51 yo with certain risk factors or if 69+ yo  If never received any pneumonia vaccine: recommend Prevnar 20 (PCV20)  Give PCV20 if previously received 1 dose of PCV13 or PPSV23   Hepatitis B Vaccine 3 dose series if at intermediate or high risk (ex: diabetes, end stage renal disease, liver disease)   Respiratory syncytial virus (RSV) Vaccine - COVERED BY MEDICARE PART D  * RSVPreF3 (Arexvy) CDC recommends that adults 61years of age and older may receive a single dose of RSV vaccine using shared clinical decision-making (SCDM)   Tetanus (Td) Vaccine - COST NOT COVERED BY MEDICARE PART B Following completion of primary series, a booster dose should be given every 10 years to maintain immunity against tetanus. Td may also be given as tetanus wound prophylaxis. Tdap Vaccine - COST NOT COVERED BY MEDICARE PART B Recommended at least once for all adults. For pregnant patients, recommended with each pregnancy. Shingles Vaccine (Shingrix) - COST NOT COVERED BY MEDICARE PART B  2 shot series recommended in those 19 years and older who have or will have weakened immune systems or those 50 years and older     Health Maintenance Due:  There are no preventive care reminders to display for this patient. Immunizations Due:      Topic Date Due   • Pneumococcal Vaccine: 65+ Years (1 - PCV) Never done   • COVID-19 Vaccine (3 - Moderna series) 10/26/2021   • Influenza Vaccine (1) 09/01/2023     Advance Directives   What are advance directives? Advance directives are legal documents that state your wishes and plans for medical care.  These plans are made ahead of time in case you lose your ability to make decisions for yourself. Advance directives can apply to any medical decision, such as the treatments you want, and if you want to donate organs. What are the types of advance directives? There are many types of advance directives, and each state has rules about how to use them. You may choose a combination of any of the following:  Living will: This is a written record of the treatment you want. You can also choose which treatments you do not want, which to limit, and which to stop at a certain time. This includes surgery, medicine, IV fluid, and tube feedings. Durable power of  for healthcare [facility]): This is a written record that states who you want to make healthcare choices for you when you are unable to make them for yourself. This person, called a proxy, is usually a family member or a friend. You may choose more than 1 proxy. Do not resuscitate (DNR) order:  A DNR order is used in case your heart stops beating or you stop breathing. It is a request not to have certain forms of treatment, such as CPR. A DNR order may be included in other types of advance directives. Medical directive: This covers the care that you want if you are in a coma, near death, or unable to make decisions for yourself. You can list the treatments you want for each condition. Treatment may include pain medicine, surgery, blood transfusions, dialysis, IV or tube feedings, and a ventilator (breathing machine). Values history: This document has questions about your views, beliefs, and how you feel and think about life. This information can help others choose the care that you would choose. Why are advance directives important? An advance directive helps you control your care. Although spoken wishes may be used, it is better to have your wishes written down. Spoken wishes can be misunderstood, or not followed.  Treatments may be given even if you do not want them. An advance directive may make it easier for your family to make difficult choices about your care. Fall Prevention    Fall prevention  includes ways to make your home and other areas safer. It also includes ways you can move more carefully to prevent a fall. Health conditions that cause changes in your blood pressure, vision, or muscle strength and coordination may increase your risk for falls. Medicines may also increase your risk for falls if they make you dizzy, weak, or sleepy. Fall prevention tips:   Stand or sit up slowly. Use assistive devices as directed. Wear shoes that fit well and have soles that . Wear a personal alarm. Stay active. Manage your medical conditions. Home Safety Tips:  Add items to prevent falls in the bathroom. Keep paths clear. Install bright lights in your home. Keep items you use often on shelves within reach. Paint or place reflective tape on the edges of your stairs. Urinary Incontinence   Urinary incontinence (UI)  is when you lose control of your bladder. UI develops because your bladder cannot store or empty urine properly. The 3 most common types of UI are stress incontinence, urge incontinence, or both. Medicines:   May be given to help strengthen your bladder control. Report any side effects of medication to your healthcare provider. Do pelvic muscle exercises often:  Your pelvic muscles help you stop urinating. Squeeze these muscles tight for 5 seconds, then relax for 5 seconds. Gradually work up to squeezing for 10 seconds. Do 3 sets of 15 repetitions a day, or as directed. This will help strengthen your pelvic muscles and improve bladder control. Train your bladder:  Go to the bathroom at set times, such as every 2 hours, even if you do not feel the urge to go. You can also try to hold your urine when you feel the urge to go. For example, hold your urine for 5 minutes when you feel the urge to go.  As that becomes easier, hold your urine for 10 minutes. Self-care:   Keep a UI record. Write down how often you leak urine and how much you leak. Make a note of what you were doing when you leaked urine. Drink liquids as directed. You may need to limit the amount of liquid you drink to help control your urine leakage. Do not drink any liquid right before you go to bed. Limit or do not have drinks that contain caffeine or alcohol. Prevent constipation. Eat a variety of high-fiber foods. Good examples are high-fiber cereals, beans, vegetables, and whole-grain breads. Walking is the best way to trigger your intestines to have a bowel movement. Exercise regularly and maintain a healthy weight. Weight loss and exercise will decrease pressure on your bladder and help you control your leakage. Use a catheter as directed  to help empty your bladder. A catheter is a tiny, plastic tube that is put into your bladder to drain your urine. Go to behavior therapy as directed. Behavior therapy may be used to help you learn to control your urge to urinate. Weight Management   Why it is important to manage your weight:  Being overweight increases your risk of health conditions such as heart disease, high blood pressure, type 2 diabetes, and certain types of cancer. It can also increase your risk for osteoarthritis, sleep apnea, and other respiratory problems. Aim for a slow, steady weight loss. Even a small amount of weight loss can lower your risk of health problems. How to lose weight safely:  A safe and healthy way to lose weight is to eat fewer calories and get regular exercise. You can lose up about 1 pound a week by decreasing the number of calories you eat by 500 calories each day. Healthy meal plan for weight management:  A healthy meal plan includes a variety of foods, contains fewer calories, and helps you stay healthy. A healthy meal plan includes the following:  Eat whole-grain foods more often.   A healthy meal plan should contain fiber. Fiber is the part of grains, fruits, and vegetables that is not broken down by your body. Whole-grain foods are healthy and provide extra fiber in your diet. Some examples of whole-grain foods are whole-wheat breads and pastas, oatmeal, brown rice, and bulgur. Eat a variety of vegetables every day. Include dark, leafy greens such as spinach, kale, chacho greens, and mustard greens. Eat yellow and orange vegetables such as carrots, sweet potatoes, and winter squash. Eat a variety of fruits every day. Choose fresh or canned fruit (canned in its own juice or light syrup) instead of juice. Fruit juice has very little or no fiber. Eat low-fat dairy foods. Drink fat-free (skim) milk or 1% milk. Eat fat-free yogurt and low-fat cottage cheese. Try low-fat cheeses such as mozzarella and other reduced-fat cheeses. Choose meat and other protein foods that are low in fat. Choose beans or other legumes such as split peas or lentils. Choose fish, skinless poultry (chicken or turkey), or lean cuts of red meat (beef or pork). Before you cook meat or poultry, cut off any visible fat. Use less fat and oil. Try baking foods instead of frying them. Add less fat, such as margarine, sour cream, regular salad dressing and mayonnaise to foods. Eat fewer high-fat foods. Some examples of high-fat foods include french fries, doughnuts, ice cream, and cakes. Eat fewer sweets. Limit foods and drinks that are high in sugar. This includes candy, cookies, regular soda, and sweetened drinks. Exercise:  Exercise at least 30 minutes per day on most days of the week. Some examples of exercise include walking, biking, dancing, and swimming. You can also fit in more physical activity by taking the stairs instead of the elevator or parking farther away from stores. Ask your healthcare provider about the best exercise plan for you.       © Copyright [employer] 2018 Information is for End User's use only and may not be sold, redistributed or otherwise used for commercial purposes.  All illustrations and images included in CareNotes® are the copyrighted property of [employer] Inc. or [address] [place] [address]

## 2023-10-11 NOTE — PROGRESS NOTES
Name: Deann Estevez      : 1940      MRN: [id number]  Encounter Provider: Areli Medina MD  Encounter Date: 10/11/2023   Encounter department: [address] [place] [address]     {There are no diagnoses linked to this encounter. (Refresh or delete this SmartLink)}       Subjective     HPI  Review of Systems    Past Medical History:   Diagnosis Date   • Coronary artery disease    • Hyperlipidemia    • Hypertension    • Hypothyroidism    • MI, old    • UTI (urinary tract infection) 2022     Past Surgical History:   Procedure Laterality Date   • CHOLECYSTECTOMY     • CORONARY ANGIOPLASTY          • TN XCAPSL CTRC RMVL INSJ IO LENS PROSTH W/O ECP Right 2022    Procedure: EXTRACTION EXTRACAPSULAR CATARACT PHACO INTRAOCULAR LENS (IOL); Surgeon: Sofía Dean MD;  [place] [facility] [place]  Service: Ophthalmology   • TN XCAPSL CTRC RMVL INSJ IO LENS PROSTH W/O ECP Left 10/17/2022    Procedure: EXTRACTION EXTRACAPSULAR CATARACT PHACO INTRAOCULAR LENS (IOL); Surgeon: Sofía Dean MD;  [place] [facility] [place];  Service: Ophthalmology   • TONSILLECTOMY       Family History   Problem Relation Age of Onset   • Lung cancer Mother    • Cirrhosis Father    • Heart attack Sister    • No Known Problems Daughter    • No Known Problems Sister    • No Known Problems Sister    • No Known Problems Daughter    • No Known Problems Daughter    • No Known Problems Maternal Aunt    • No Known Problems Maternal Aunt    • No Known Problems Maternal Aunt    • No Known Problems Maternal Aunt    • No Known Problems Maternal Aunt      Social History     Socioeconomic History   • Marital status:       Spouse name: None   • Number of children: None   • Years of education: None   • Highest education level: None   Occupational History   • None   Tobacco Use   • Smoking status: Never   • Smokeless tobacco: Never   Vaping Use   • Vaping Use: Never used   Substance and Sexual Activity   • Alcohol use: No   • Drug use: No   • Sexual activity: None   Other Topics Concern   • None   Social History Narrative   • None     Social Determinants of Health     Financial Resource Strain: Not on file   Food Insecurity: Not on file   Transportation Needs: Not on file   Physical Activity: Not on file   Stress: Not on file   Social Connections: Not on file   Intimate Partner Violence: Not on file   Housing Stability: Not on file     Current Outpatient Medications on File Prior to Visit   Medication Sig   • ammonium lactate (LAC-HYDRIN) 12 % cream Apply topically as needed for dry skin   • Biotin 2500 MCG CAPS Take by mouth daily   • Cholecalciferol (VITAMIN D-3 PO) Take by mouth daily   • Cholecalciferol (Vitamin D3) 701926 UNIT/GM POWD Take 50 mcg by mouth daily   • diclofenac sodium (VOLTAREN) 1 % Apply 2 g topically 4 (four) times a day   • gatifloxacin (ZYMAXID) 0.5 % Administer 1 drop into the left eye 2 (two) times a day (Patient not taking: Reported on 2/23/2023)   • hydrochlorothiazide (HYDRODIURIL) 12.5 mg tablet Take 12.5 mg by mouth daily   • ketorolac (ACULAR) 0.5 % ophthalmic solution Administer 1 drop into the left eye 4 (four) times a day (Patient not taking: Reported on 2/23/2023)   • levothyroxine 100 mcg tablet Take 137 mcg by mouth daily   • metoprolol succinate (TOPROL-XL) 25 mg 24 hr tablet Take 25 mg by mouth daily   • nitroglycerin (NITROSTAT) 0.4 mg SL tablet PLACE ONE TABLET UNDER THE TONGUE AS DIRECTED   • ondansetron (ZOFRAN) 4 mg tablet Take 1 tablet (4 mg total) by mouth every 8 (eight) hours as needed for nausea or vomiting for up to 10 doses. • Saccharomyces boulardii (Probiotic) 250 MG CAPS Take by mouth   • simvastatin (ZOCOR) 20 mg tablet Take 20 mg by mouth daily at bedtime. • Synthroid 137 MCG tablet Take 137 mcg by mouth daily   • valsartan (DIOVAN) 160 mg tablet Take 160 mg by mouth daily   • Vascepa 1 g CAPS TAKE 2 CAPSULES BY ORAL ROUTE 2 TIMES EVERY DAY WITH FOOD SWALLOWING WHOLE.  DO NOT CHEW, OPEN, DISSOLVE AND/OR CRUSH.      Allergies   Allergen Reactions   • Iodinated Contrast Media Hives   • Fenofibrate Hives     Immunization History   Administered Date(s) Administered   • COVID-19 MODERNA VACC 0.5 ML IM 08/03/2021, 08/31/2021       Objective     /68   Pulse 64   Temp (!) 97.4 °F (36.3 °C)   Resp 18   Ht 5' 1.5" (1.562 m)   Wt 79.1 kg (174 lb 6.4 oz)   BMI 32.42 kg/m²     Physical Exam  Jono Connelly MD

## 2023-10-11 NOTE — ASSESSMENT & PLAN NOTE
Labs were ordered and she will continue vascepa and simvastatin. Encouraged low fat diet and exercise.

## 2023-10-18 ENCOUNTER — APPOINTMENT (OUTPATIENT)
Dept: LAB | Facility: CLINIC | Age: 83
End: 2023-10-18
Payer: MEDICARE

## 2023-10-18 DIAGNOSIS — I10 PRIMARY HYPERTENSION: ICD-10-CM

## 2023-10-18 DIAGNOSIS — E78.2 MIXED HYPERLIPIDEMIA: ICD-10-CM

## 2023-10-18 DIAGNOSIS — R73.09 ELEVATED GLUCOSE: ICD-10-CM

## 2023-10-18 DIAGNOSIS — E03.9 HYPOTHYROIDISM, UNSPECIFIED TYPE: ICD-10-CM

## 2023-10-18 LAB
ALBUMIN SERPL BCP-MCNC: 3.7 G/DL (ref 3.5–5)
ALP SERPL-CCNC: 50 U/L (ref 34–104)
ALT SERPL W P-5'-P-CCNC: 11 U/L (ref 7–52)
ANION GAP SERPL CALCULATED.3IONS-SCNC: 9 MMOL/L
AST SERPL W P-5'-P-CCNC: 16 U/L (ref 13–39)
BASOPHILS # BLD AUTO: 0.07 THOUSANDS/ÂΜL (ref 0–0.1)
BASOPHILS NFR BLD AUTO: 1 % (ref 0–1)
BILIRUB SERPL-MCNC: 0.28 MG/DL (ref 0.2–1)
BUN SERPL-MCNC: 15 MG/DL (ref 5–25)
CALCIUM SERPL-MCNC: 9.1 MG/DL (ref 8.4–10.2)
CHLORIDE SERPL-SCNC: 102 MMOL/L (ref 96–108)
CHOLEST SERPL-MCNC: 134 MG/DL
CO2 SERPL-SCNC: 30 MMOL/L (ref 21–32)
CREAT SERPL-MCNC: 0.93 MG/DL (ref 0.6–1.3)
EOSINOPHIL # BLD AUTO: 0.28 THOUSAND/ÂΜL (ref 0–0.61)
EOSINOPHIL NFR BLD AUTO: 3 % (ref 0–6)
ERYTHROCYTE [DISTWIDTH] IN BLOOD BY AUTOMATED COUNT: 13.3 % (ref 11.6–15.1)
EST. AVERAGE GLUCOSE BLD GHB EST-MCNC: 146 MG/DL
GFR SERPL CREATININE-BSD FRML MDRD: 56 ML/MIN/1.73SQ M
GLUCOSE P FAST SERPL-MCNC: 114 MG/DL (ref 65–99)
HBA1C MFR BLD: 6.7 %
HCT VFR BLD AUTO: 36.1 % (ref 34.8–46.1)
HDLC SERPL-MCNC: 28 MG/DL
HGB BLD-MCNC: 11.7 G/DL (ref 11.5–15.4)
IMM GRANULOCYTES # BLD AUTO: 0.03 THOUSAND/UL (ref 0–0.2)
IMM GRANULOCYTES NFR BLD AUTO: 0 % (ref 0–2)
LDLC SERPL CALC-MCNC: 46 MG/DL (ref 0–100)
LYMPHOCYTES # BLD AUTO: 3.57 THOUSANDS/ÂΜL (ref 0.6–4.47)
LYMPHOCYTES NFR BLD AUTO: 42 % (ref 14–44)
MCH RBC QN AUTO: 31 PG (ref 26.8–34.3)
MCHC RBC AUTO-ENTMCNC: 32.4 G/DL (ref 31.4–37.4)
MCV RBC AUTO: 96 FL (ref 82–98)
MONOCYTES # BLD AUTO: 0.87 THOUSAND/ÂΜL (ref 0.17–1.22)
MONOCYTES NFR BLD AUTO: 10 % (ref 4–12)
NEUTROPHILS # BLD AUTO: 3.75 THOUSANDS/ÂΜL (ref 1.85–7.62)
NEUTS SEG NFR BLD AUTO: 44 % (ref 43–75)
NONHDLC SERPL-MCNC: 106 MG/DL
NRBC BLD AUTO-RTO: 0 /100 WBCS
PLATELET # BLD AUTO: 255 THOUSANDS/UL (ref 149–390)
PMV BLD AUTO: 11.2 FL (ref 8.9–12.7)
POTASSIUM SERPL-SCNC: 4.3 MMOL/L (ref 3.5–5.3)
PROT SERPL-MCNC: 6.7 G/DL (ref 6.4–8.4)
RBC # BLD AUTO: 3.77 MILLION/UL (ref 3.81–5.12)
SODIUM SERPL-SCNC: 141 MMOL/L (ref 135–147)
TRIGL SERPL-MCNC: 298 MG/DL
TSH SERPL DL<=0.05 MIU/L-ACNC: 0.88 UIU/ML (ref 0.45–4.5)
WBC # BLD AUTO: 8.57 THOUSAND/UL (ref 4.31–10.16)

## 2023-10-18 PROCEDURE — 84443 ASSAY THYROID STIM HORMONE: CPT

## 2023-10-18 PROCEDURE — 83036 HEMOGLOBIN GLYCOSYLATED A1C: CPT

## 2023-10-18 PROCEDURE — 36415 COLL VENOUS BLD VENIPUNCTURE: CPT

## 2023-10-18 PROCEDURE — 80053 COMPREHEN METABOLIC PANEL: CPT

## 2023-10-18 PROCEDURE — 80061 LIPID PANEL: CPT

## 2023-10-18 PROCEDURE — 85025 COMPLETE CBC W/AUTO DIFF WBC: CPT

## 2023-10-23 ENCOUNTER — TELEPHONE (OUTPATIENT)
Age: 83
End: 2023-10-23

## 2023-10-23 DIAGNOSIS — R05.9 COUGH, UNSPECIFIED TYPE: Primary | ICD-10-CM

## 2023-10-23 RX ORDER — BENZONATATE 200 MG/1
200 CAPSULE ORAL 3 TIMES DAILY PRN
Qty: 20 CAPSULE | Refills: 0 | Status: SHIPPED | OUTPATIENT
Start: 2023-10-23

## 2023-10-23 NOTE — TELEPHONE ENCOUNTER
Reason for call:     Patient requesting a new script for the cough med benzonate.  Script is not on her acitve list       [x] Refill   [] Prior Auth  [] Other:     Office:   [x] PCP/Provider - darin Wong  [] Specialty/Provider -     Medication: benzonate 200 mg , 1 tid, (patient states she takes 1 tablet daily)      [facility]

## 2023-12-08 DIAGNOSIS — E78.2 MIXED HYPERLIPIDEMIA: Primary | ICD-10-CM

## 2023-12-08 NOTE — TELEPHONE ENCOUNTER
Reason for call:   [x] Refill   [] Prior Auth  [] Other:     Office:   [x] PCP/Provider -   [] Specialty/Provider -     Medication: simvastatin 20 mg 1 daily at bedtime    Quantity: 90    Pharmacy: [id number] [facility],  [facility] [phone]     Does the patient have enough for 3 days?    [x] Yes   [] No - Send as HP to POD

## 2023-12-11 RX ORDER — SIMVASTATIN 20 MG
20 TABLET ORAL
Qty: 90 TABLET | Refills: 3 | Status: SHIPPED | OUTPATIENT
Start: 2023-12-11

## 2024-02-12 ENCOUNTER — RA CDI HCC (OUTPATIENT)
Dept: OTHER | Facility: HOSPITAL | Age: 84
End: 2024-02-12

## 2024-02-14 ENCOUNTER — OFFICE VISIT (OUTPATIENT)
Dept: FAMILY MEDICINE CLINIC | Facility: CLINIC | Age: 84
End: 2024-02-14
Payer: MEDICARE

## 2024-02-14 VITALS
WEIGHT: 179 LBS | BODY MASS INDEX: 32.94 KG/M2 | SYSTOLIC BLOOD PRESSURE: 120 MMHG | HEART RATE: 88 BPM | TEMPERATURE: 97.7 F | DIASTOLIC BLOOD PRESSURE: 58 MMHG | HEIGHT: 62 IN | RESPIRATION RATE: 18 BRPM

## 2024-02-14 DIAGNOSIS — R73.09 ELEVATED GLUCOSE: ICD-10-CM

## 2024-02-14 DIAGNOSIS — I10 PRIMARY HYPERTENSION: Primary | ICD-10-CM

## 2024-02-14 DIAGNOSIS — E78.2 MIXED HYPERLIPIDEMIA: ICD-10-CM

## 2024-02-14 DIAGNOSIS — M94.0 COSTOCHONDRITIS: ICD-10-CM

## 2024-02-14 DIAGNOSIS — I25.10 CORONARY ARTERY DISEASE INVOLVING NATIVE CORONARY ARTERY OF NATIVE HEART WITHOUT ANGINA PECTORIS: ICD-10-CM

## 2024-02-14 DIAGNOSIS — E03.9 HYPOTHYROIDISM, UNSPECIFIED TYPE: ICD-10-CM

## 2024-02-14 DIAGNOSIS — K63.5 POLYP OF COLON, UNSPECIFIED PART OF COLON, UNSPECIFIED TYPE: ICD-10-CM

## 2024-02-14 PROCEDURE — 99214 OFFICE O/P EST MOD 30 MIN: CPT | Performed by: INTERNAL MEDICINE

## 2024-02-14 RX ORDER — FENOFIBRATE 145 MG/1
145 TABLET, COATED ORAL DAILY
COMMUNITY
Start: 2023-12-21

## 2024-02-14 NOTE — PROGRESS NOTES
Name: Adelina Dyer      : 1940      MRN: [id number]  Encounter Provider: Destinee Wong MD  Encounter Date: 2024   Encounter department: [facility]    Assessment & Plan     1. Primary hypertension  Assessment & Plan:  Well controlled on current doses of valsartan and metoprolol.  Will continue and encouraged exercise and weight loss.       2. Hypothyroidism, unspecified type  Assessment & Plan:  Latest TSH is normal and she will continue levothyroxine at current dose.        3. Mixed hyperlipidemia  Assessment & Plan:  Reviewed labs with patient.  LDL was well controlled, but she has not yet started fenofibrate for her triglycerides and was urged to start.       4. Elevated glucose  -     Hemoglobin A1C; Future; Expected date: 2024    5. Polyp of colon, unspecified part of colon, unspecified type  Comments:  Per patient, she will follow up with Dr. Nathan for colonoscopy.  Orders:  -     Ambulatory Referral to Gastroenterology; Future    6. Coronary artery disease involving native coronary artery of native heart without angina pectoris  Assessment & Plan:  She is currently without exertional chest pain and continues to follow with cardiology.      7. Costochondritis  Comments:  Advised tylenol and moist heat prn.         Subjective      Here for a follow up visit.  She was given fenofibrate by Dr. Bustamante but has not yet started this because she read the possible side effects.  She does have some chest discomfort at the bottom of her ribs for the past few weeks, it is not exertional but can be positional.  Her bra will bother this so she has not been wearing a bra and it seems to help.  There is no cold or heat intolerance, diarrhea or constipation, hair or skin changes, unanticipated weight gain or loss.        Review of Systems   Constitutional: Negative.    Respiratory: Negative.     Cardiovascular:  Positive for chest pain. Negative for palpitations and leg swelling.  "  Endocrine: Negative.        Current Outpatient Medications on File Prior to Visit   Medication Sig   • ammonium lactate (LAC-HYDRIN) 12 % cream Apply topically as needed for dry skin   • Biotin 2500 MCG CAPS Take by mouth daily   • Cholecalciferol (VITAMIN D-3 PO) Take by mouth daily   • Cholecalciferol (Vitamin D3) 142463 UNIT/GM POWD Take 50 mcg by mouth daily   • diclofenac sodium (VOLTAREN) 1 % Apply 2 g topically 4 (four) times a day   • fenofibrate (TRICOR) 145 mg tablet Take 145 mg by mouth daily   • hydrochlorothiazide (HYDRODIURIL) 12.5 mg tablet Take 12.5 mg by mouth daily   • metoprolol succinate (TOPROL-XL) 25 mg 24 hr tablet Take 1 tablet (25 mg total) by mouth daily   • nitroglycerin (NITROSTAT) 0.4 mg SL tablet if needed   • ondansetron (ZOFRAN) 4 mg tablet Take 1 tablet (4 mg total) by mouth every 8 (eight) hours as needed for nausea or vomiting for up to 10 doses.   • Saccharomyces boulardii (Probiotic) 250 MG CAPS Take by mouth   • simvastatin (ZOCOR) 20 mg tablet Take 1 tablet (20 mg total) by mouth daily at bedtime   • Synthroid 137 MCG tablet Take 137 mcg by mouth daily   • valsartan (DIOVAN) 160 mg tablet Take 1 tablet (160 mg total) by mouth daily   • Vascepa 1 g CAPS TAKE 2 CAPSULES BY ORAL ROUTE 2 TIMES EVERY DAY WITH FOOD SWALLOWING WHOLE. DO NOT CHEW, OPEN, DISSOLVE AND/OR CRUSH.   • [DISCONTINUED] benzonatate (TESSALON) 200 MG capsule Take 1 capsule (200 mg total) by mouth 3 (three) times a day as needed for cough (Patient not taking: Reported on 2/14/2024)       Objective     /58   Pulse 88   Temp 97.7 °F (36.5 °C)   Resp 18   Ht 5' 1.5\" (1.562 m)   Wt 81.2 kg (179 lb)   BMI 33.27 kg/m²     Physical Exam  Constitutional:       Appearance: She is well-developed.   Eyes:      Conjunctiva/sclera: Conjunctivae normal.   Neck:      Thyroid: No thyromegaly.      Vascular: No JVD.   Cardiovascular:      Rate and Rhythm: Normal rate and regular rhythm.      Heart sounds: Normal " heart sounds. No murmur heard.     No friction rub. No gallop.   Pulmonary:      Effort: Pulmonary effort is normal.      Breath sounds: Normal breath sounds. No wheezing or rales.   Chest:      Chest wall: Tenderness present.      Comments: Tenderness lower border of right ribs at mid clavicular line  Abdominal:      General: Bowel sounds are normal. There is no distension.      Palpations: Abdomen is soft.      Tenderness: There is no abdominal tenderness.   Musculoskeletal:      Cervical back: Neck supple.       Destinee Wong MD

## 2024-02-14 NOTE — ASSESSMENT & PLAN NOTE
Reviewed labs with patient.  LDL was well controlled, but she has not yet started fenofibrate for her triglycerides and was urged to start.

## 2024-02-22 RX ORDER — LEVOTHYROXINE SODIUM 137 MCG
137 TABLET ORAL DAILY
Qty: 90 TABLET | Refills: 1 | Status: CANCELLED | OUTPATIENT
Start: 2024-02-22

## 2024-02-22 NOTE — TELEPHONE ENCOUNTER
Previously prescribed by former PCP    Reason for call:   [x] Refill   [] Prior Auth  [] Other:     Office:   [x] PCP/Provider - [facility] / Marvin  [] Specialty/Provider -     Medication: Synthroid (BRAND)    Dose/Frequency: 137mcg qd    Quantity: 90    Pharmacy: [id number] [place], [place] - [address]     Does the patient have enough for 3 days?   [x] Yes   [] No - Send as HP to POD

## 2024-02-23 DIAGNOSIS — E03.9 HYPOTHYROIDISM, UNSPECIFIED TYPE: Primary | ICD-10-CM

## 2024-02-26 RX ORDER — LEVOTHYROXINE SODIUM 137 MCG
137 TABLET ORAL DAILY
Qty: 90 TABLET | Refills: 3 | Status: SHIPPED | OUTPATIENT
Start: 2024-02-26

## 2024-03-21 ENCOUNTER — OFFICE VISIT (OUTPATIENT)
Dept: GASTROENTEROLOGY | Facility: CLINIC | Age: 84
End: 2024-03-21
Payer: MEDICARE

## 2024-03-21 ENCOUNTER — TELEPHONE (OUTPATIENT)
Age: 84
End: 2024-03-21

## 2024-03-21 VITALS
BODY MASS INDEX: 32.02 KG/M2 | HEART RATE: 51 BPM | WEIGHT: 174 LBS | DIASTOLIC BLOOD PRESSURE: 84 MMHG | SYSTOLIC BLOOD PRESSURE: 166 MMHG | HEIGHT: 62 IN

## 2024-03-21 DIAGNOSIS — R10.13 DYSPEPSIA: ICD-10-CM

## 2024-03-21 DIAGNOSIS — K63.5 POLYP OF COLON, UNSPECIFIED PART OF COLON, UNSPECIFIED TYPE: ICD-10-CM

## 2024-03-21 DIAGNOSIS — R19.4 CHANGE IN BOWEL HABIT: Primary | ICD-10-CM

## 2024-03-21 PROCEDURE — G2211 COMPLEX E/M VISIT ADD ON: HCPCS | Performed by: INTERNAL MEDICINE

## 2024-03-21 PROCEDURE — 99213 OFFICE O/P EST LOW 20 MIN: CPT | Performed by: INTERNAL MEDICINE

## 2024-03-21 RX ORDER — POLYETHYLENE GLYCOL 3350, SODIUM CHLORIDE, SODIUM BICARBONATE, POTASSIUM CHLORIDE 420; 11.2; 5.72; 1.48 G/4L; G/4L; G/4L; G/4L
4000 POWDER, FOR SOLUTION ORAL ONCE
Qty: 4000 ML | Refills: 0 | Status: SHIPPED | OUTPATIENT
Start: 2024-03-21 | End: 2024-03-21

## 2024-03-21 NOTE — TELEPHONE ENCOUNTER
Pt was at her GI appt today and her BP was slightly elevated at 166/84.  She states that she has been taking her BP meds every day and has not missed a dose.  She states that she does have a cold with some congestion and doesn't feel the best.  She is using zyrtec to treat her cold symptoms.  Pt denies any HA, chest pain, SOB, visual disturbances or weakness.  Pt has home BP cuff that she will use to monitor her BP at the same time every day over the next few days.  She will call for an appt if BP stays elevated.  Care advice and ED precautions given.

## 2024-03-21 NOTE — PATIENT INSTRUCTIONS
Scheduled date of EGD/colonoscopy (as of today):06/14/24  Physician performing EGD/colonoscopy:Dr. Nathan  Location of EGD/colonoscopy:[facility]  Desired bowel prep reviewed with patient:Ana  Instructions reviewed with patient by:felicia  Clearances:   n/a

## 2024-03-21 NOTE — PROGRESS NOTES
[facility] - Outpatient Follow-up Note  Adelina Dyer 83 y.o. female MRN: [id number]  Encounter: [id number]          ASSESSMENT AND PLAN:      1. Change in bowel habit  Symptoms suggestive of dyssynergic defecation.  Will evaluate with colonoscopy.  She will maximize dietary fiber intake and use a stool to prop up her feet when moving her bowels.  If colonoscopy is unrevealing and symptoms persist we will likely refer for anorectal manometry and balloon expulsion testing    - Colonoscopy; Future  - polyethylene glycol-electrolytes (TriLyte) 4000 mL solution; Take 4,000 mL by mouth once for 1 dose Take 4000 mL by mouth once for 1 dose. Use as directed  Dispense: 4000 mL; Refill: 0    2. Dyspepsia    - EGD; Future    3. Polyp of colon, unspecified part of colon, unspecified type    - Ambulatory Referral to Gastroenterology    ______________________________________________________________________    SUBJECTIVE: Adelina is a former patient of Dr. Ordonez who presents with a change in bowel habit and abdominal discomfort.  Reports she had COVID several months ago and around that time she developed difficulty moving her bowels.  This has persisted with straining to move her bowels regardless of the consistency.  No blood in her stool, no unexplained weight loss.  Does have a history of prior adenomatous polyps, last colonoscopy 2017      REVIEW OF SYSTEMS:    ROS       Historical Information   Past Medical History:   Diagnosis Date    Coronary artery disease     Hyperlipidemia     Hypertension     Hypothyroidism     MI, old     UTI (urinary tract infection) 07/26/2022     Past Surgical History:   Procedure Laterality Date    CHOLECYSTECTOMY      CORONARY ANGIOPLASTY      2001     UT XCAPSL CTRC RMVL INSJ IO LENS PROSTH W/O ECP Right 8/1/2022    Procedure: EXTRACTION EXTRACAPSULAR CATARACT PHACO INTRAOCULAR LENS (IOL);  Surgeon: Finn Heredia MD;  Location: [facility] MAIN OR;  Service:  "Ophthalmology    [place] XCAPSL CTRC RMVL INSJ IO LENS PROSTH W/O ECP Left 10/17/2022    Procedure: EXTRACTION EXTRACAPSULAR CATARACT PHACO INTRAOCULAR LENS (IOL);  Surgeon: Finn Heredia MD;  Location: [facility] MAIN OR;  Service: Ophthalmology    TONSILLECTOMY       Social History   Social History     Substance and Sexual Activity   Alcohol Use No     Social History     Substance and Sexual Activity   Drug Use No     Social History     Tobacco Use   Smoking Status Never   Smokeless Tobacco Never     Family History   Problem Relation Age of Onset    Thyroid disease Mother     Lung cancer Mother     Cirrhosis Father     Heart attack Sister     No Known Problems Sister     No Known Problems Sister     No Known Problems Daughter     No Known Problems Daughter     No Known Problems Daughter     No Known Problems Maternal Aunt     No Known Problems Maternal Aunt     No Known Problems Maternal Aunt     No Known Problems Maternal Aunt     No Known Problems Maternal Aunt        Meds/Allergies       Current Outpatient Medications:     ammonium lactate (LAC-HYDRIN) 12 % cream    Biotin 2500 MCG CAPS    Cholecalciferol (VITAMIN D-3 PO)    Cholecalciferol (Vitamin D3) 094443 UNIT/GM POWD    diclofenac sodium (VOLTAREN) 1 %    fenofibrate (TRICOR) 145 mg tablet    hydrochlorothiazide (HYDRODIURIL) 12.5 mg tablet    metoprolol succinate (TOPROL-XL) 25 mg 24 hr tablet    nitroglycerin (NITROSTAT) 0.4 mg SL tablet    ondansetron (ZOFRAN) 4 mg tablet    polyethylene glycol-electrolytes (TriLyte) 4000 mL solution    Saccharomyces boulardii (Probiotic) 250 MG CAPS    simvastatin (ZOCOR) 20 mg tablet    Synthroid 137 MCG tablet    valsartan (DIOVAN) 160 mg tablet    Vascepa 1 g CAPS    Allergies   Allergen Reactions    Iodinated Contrast Media Hives           Objective     Blood pressure 166/84, pulse (!) 51, height 5' 1.5\" (1.562 m), weight 78.9 kg (174 lb). Body mass index is 32.34 kg/m².      PHYSICAL EXAM:      Physical Exam  Vitals and " nursing note reviewed.   Constitutional:       General: She is not in acute distress.     Appearance: She is not ill-appearing.   HENT:      Head: Normocephalic and atraumatic.   Eyes:      General: No scleral icterus.     Extraocular Movements: Extraocular movements intact.   Cardiovascular:      Rate and Rhythm: Normal rate and regular rhythm.   Pulmonary:      Effort: Pulmonary effort is normal. No respiratory distress.   Skin:     General: Skin is warm and dry.      Coloration: Skin is not cyanotic.      Findings: No erythema.   Neurological:      General: No focal deficit present.      Mental Status: She is alert and oriented to person, place, and time.   Psychiatric:         Mood and Affect: Mood normal.         Behavior: Behavior normal.          Lab Results:   No visits with results within 1 Day(s) from this visit.   Latest known visit with results is:   Appointment on 10/18/2023   Component Date Value    WBC 10/18/2023 8.57     RBC 10/18/2023 3.77 (L)     Hemoglobin 10/18/2023 11.7     Hematocrit 10/18/2023 36.1     MCV 10/18/2023 96     MCH 10/18/2023 31.0     MCHC 10/18/2023 32.4     RDW 10/18/2023 13.3     MPV 10/18/2023 11.2     Platelets 10/18/2023 255     nRBC 10/18/2023 0     Neutrophils Relative 10/18/2023 44     Immature Grans % 10/18/2023 0     Lymphocytes Relative 10/18/2023 42     Monocytes Relative 10/18/2023 10     Eosinophils Relative 10/18/2023 3     Basophils Relative 10/18/2023 1     Neutrophils Absolute 10/18/2023 3.75     Absolute Immature Grans 10/18/2023 0.03     Absolute Lymphocytes 10/18/2023 3.57     Absolute Monocytes 10/18/2023 0.87     Eosinophils Absolute 10/18/2023 0.28     Basophils Absolute 10/18/2023 0.07     Sodium 10/18/2023 141     Potassium 10/18/2023 4.3     Chloride 10/18/2023 102     CO2 10/18/2023 30     ANION GAP 10/18/2023 9     BUN 10/18/2023 15     Creatinine 10/18/2023 0.93     Glucose, Fasting 10/18/2023 114 (H)     Calcium 10/18/2023 9.1     AST 10/18/2023 16      ALT 10/18/2023 11     Alkaline Phosphatase 10/18/2023 50     Total Protein 10/18/2023 6.7     Albumin 10/18/2023 3.7     Total Bilirubin 10/18/2023 0.28     eGFR 10/18/2023 56     Cholesterol 10/18/2023 134     Triglycerides 10/18/2023 298 (H)     HDL, Direct 10/18/2023 28 (L)     LDL Calculated 10/18/2023 46     Non-HDL-Chol (CHOL-HDL) 10/18/2023 106     Hemoglobin A1C 10/18/2023 6.7 (H)     EAG 10/18/2023 146     TSH 3RD GENERATON 10/18/2023 0.883          Radiology Results:   No results found.

## 2024-03-28 ENCOUNTER — TRANSCRIBE ORDERS (OUTPATIENT)
Dept: LAB | Facility: CLINIC | Age: 84
End: 2024-03-28

## 2024-03-28 ENCOUNTER — APPOINTMENT (OUTPATIENT)
Dept: LAB | Facility: CLINIC | Age: 84
End: 2024-03-28
Payer: MEDICARE

## 2024-03-28 DIAGNOSIS — E78.00 HYPERCHOLESTEROLEMIA: Primary | ICD-10-CM

## 2024-03-28 DIAGNOSIS — Z79.899 ENCOUNTER FOR LONG-TERM (CURRENT) USE OF MEDICATIONS: ICD-10-CM

## 2024-03-28 DIAGNOSIS — R73.09 ELEVATED GLUCOSE: ICD-10-CM

## 2024-03-28 DIAGNOSIS — E78.00 HYPERCHOLESTEROLEMIA: ICD-10-CM

## 2024-03-28 DIAGNOSIS — I10 HYPERTENSION, ESSENTIAL: ICD-10-CM

## 2024-03-28 LAB
ALBUMIN SERPL BCP-MCNC: 4.1 G/DL (ref 3.5–5)
ALP SERPL-CCNC: 41 U/L (ref 34–104)
ALT SERPL W P-5'-P-CCNC: 13 U/L (ref 7–52)
ANION GAP SERPL CALCULATED.3IONS-SCNC: 8 MMOL/L (ref 4–13)
AST SERPL W P-5'-P-CCNC: 18 U/L (ref 13–39)
BILIRUB DIRECT SERPL-MCNC: 0.11 MG/DL (ref 0–0.2)
BILIRUB SERPL-MCNC: 0.47 MG/DL (ref 0.2–1)
BUN SERPL-MCNC: 21 MG/DL (ref 5–25)
CALCIUM SERPL-MCNC: 8.9 MG/DL (ref 8.4–10.2)
CHLORIDE SERPL-SCNC: 105 MMOL/L (ref 96–108)
CHOLEST SERPL-MCNC: 129 MG/DL
CK SERPL-CCNC: 77 U/L (ref 26–192)
CO2 SERPL-SCNC: 27 MMOL/L (ref 21–32)
CREAT SERPL-MCNC: 1.07 MG/DL (ref 0.6–1.3)
EST. AVERAGE GLUCOSE BLD GHB EST-MCNC: 137 MG/DL
GFR SERPL CREATININE-BSD FRML MDRD: 47 ML/MIN/1.73SQ M
GLUCOSE P FAST SERPL-MCNC: 114 MG/DL (ref 65–99)
HBA1C MFR BLD: 6.4 %
HDLC SERPL-MCNC: 47 MG/DL
LDLC SERPL CALC-MCNC: 55 MG/DL (ref 0–100)
NONHDLC SERPL-MCNC: 82 MG/DL
POTASSIUM SERPL-SCNC: 4.3 MMOL/L (ref 3.5–5.3)
PROT SERPL-MCNC: 6.9 G/DL (ref 6.4–8.4)
SODIUM SERPL-SCNC: 140 MMOL/L (ref 135–147)
TRIGL SERPL-MCNC: 133 MG/DL

## 2024-03-28 PROCEDURE — 83036 HEMOGLOBIN GLYCOSYLATED A1C: CPT

## 2024-03-28 PROCEDURE — 80048 BASIC METABOLIC PNL TOTAL CA: CPT

## 2024-03-28 PROCEDURE — 82550 ASSAY OF CK (CPK): CPT

## 2024-03-28 PROCEDURE — 80076 HEPATIC FUNCTION PANEL: CPT

## 2024-03-28 PROCEDURE — 80061 LIPID PANEL: CPT

## 2024-03-28 PROCEDURE — 36415 COLL VENOUS BLD VENIPUNCTURE: CPT

## 2024-04-02 ENCOUNTER — APPOINTMENT (OUTPATIENT)
Dept: LAB | Facility: CLINIC | Age: 84
End: 2024-04-02
Payer: MEDICARE

## 2024-04-02 ENCOUNTER — OFFICE VISIT (OUTPATIENT)
Dept: FAMILY MEDICINE CLINIC | Facility: CLINIC | Age: 84
End: 2024-04-02
Payer: MEDICARE

## 2024-04-02 VITALS
SYSTOLIC BLOOD PRESSURE: 142 MMHG | WEIGHT: 176 LBS | DIASTOLIC BLOOD PRESSURE: 88 MMHG | BODY MASS INDEX: 32.39 KG/M2 | TEMPERATURE: 98.6 F | RESPIRATION RATE: 17 BRPM | HEIGHT: 62 IN | HEART RATE: 63 BPM

## 2024-04-02 DIAGNOSIS — E03.9 HYPOTHYROIDISM, UNSPECIFIED TYPE: ICD-10-CM

## 2024-04-02 DIAGNOSIS — I10 PRIMARY HYPERTENSION: Primary | ICD-10-CM

## 2024-04-02 DIAGNOSIS — I25.10 CORONARY ARTERY DISEASE INVOLVING NATIVE CORONARY ARTERY OF NATIVE HEART WITHOUT ANGINA PECTORIS: ICD-10-CM

## 2024-04-02 DIAGNOSIS — E78.2 MIXED HYPERLIPIDEMIA: ICD-10-CM

## 2024-04-02 LAB
T4 FREE SERPL-MCNC: 1.13 NG/DL (ref 0.61–1.12)
TSH SERPL DL<=0.05 MIU/L-ACNC: 2.37 UIU/ML (ref 0.45–4.5)

## 2024-04-02 PROCEDURE — 84443 ASSAY THYROID STIM HORMONE: CPT

## 2024-04-02 PROCEDURE — 99214 OFFICE O/P EST MOD 30 MIN: CPT | Performed by: FAMILY MEDICINE

## 2024-04-02 PROCEDURE — 36415 COLL VENOUS BLD VENIPUNCTURE: CPT

## 2024-04-02 PROCEDURE — G2211 COMPLEX E/M VISIT ADD ON: HCPCS | Performed by: FAMILY MEDICINE

## 2024-04-02 PROCEDURE — 84439 ASSAY OF FREE THYROXINE: CPT

## 2024-04-02 RX ORDER — VALSARTAN 160 MG/1
320 TABLET ORAL DAILY
Qty: 90 TABLET | Refills: 3 | Status: SHIPPED | OUTPATIENT
Start: 2024-04-02

## 2024-04-02 NOTE — ASSESSMENT & PLAN NOTE
Lab Results   Component Value Date    CHOLESTEROL 129 03/28/2024    CHOLESTEROL 134 10/18/2023    CHOLESTEROL 173 06/07/2023     Lab Results   Component Value Date    HDL 47 (L) 03/28/2024    HDL 28 (L) 10/18/2023    HDL 34 (L) 06/07/2023     Lab Results   Component Value Date    TRIG 133 03/28/2024    TRIG 298 (H) 10/18/2023    TRIG 275 (H) 06/07/2023     Lab Results   Component Value Date    NONHDLC 82 03/28/2024    NONHDLC 106 10/18/2023    NONHDLC 139 06/07/2023      Well controlled on simvastatin 20mg daily and fenofibrate.

## 2024-04-02 NOTE — ASSESSMENT & PLAN NOTE
Follows cardiologist Dr. Bustamante. Currently on metoprolol 25mg daily, valsartan 160mg daily and HCTZ 12.5mg daily. Given that her blood pressures have been higher over the past few weeks, will increase dose of valsartan from 160mg to 320mg daily- she states she has a lot of the 160mg tablets so will just take 2 at once until they are done. She will schedule f/u with Dr. Bustamante and keep a home BP log.    [facility] Medicine Goals of Care Note    Patient:  Sukh Moran Date:  2021   :  1949 Attending:  Remy Dunne MD   71 year old male      Decision Maker:  Patient  Patient Able to Participate:  Yes    A discussion of the patient's Goals of Care has been completed with the patient regarding the patient's current condition, prognosis, and future goals of care.  The patient has a good understanding of the patient’s current condition and overall disease process.     The Goals of Care include   Full Resuscitation (usual medical care including resuscitation, intubation, vasopressors, and transfer to the ICU if/when indicated)  The anticipated/desired location for the patient upon discharge would be  Home      Remy Dunne MD   21 10:49 AM

## 2024-04-02 NOTE — PROGRESS NOTES
Name: Adelina Dyer      : 1940      MRN: [id number]  Encounter Provider: Amina Anderson MD  Encounter Date: 2024   Encounter department: formerly [facility]    Assessment & Plan     1. Primary hypertension  Assessment & Plan:  Follows cardiologist Dr. Bustamante. Currently on metoprolol 25mg daily, valsartan 160mg daily and HCTZ 12.5mg daily. Given that her blood pressures have been higher over the past few weeks, will increase dose of valsartan from 160mg to 320mg daily- she states she has a lot of the 160mg tablets so will just take 2 at once until they are done. She will schedule f/u with Dr. Bustamante and keep a home BP log.     Orders:  -     valsartan (DIOVAN) 160 mg tablet; Take 2 tablets (320 mg total) by mouth daily    2. Mixed hyperlipidemia  Assessment & Plan:  Lab Results   Component Value Date    CHOLESTEROL 129 2024    CHOLESTEROL 134 10/18/2023    CHOLESTEROL 173 2023     Lab Results   Component Value Date    HDL 47 (L) 2024    HDL 28 (L) 10/18/2023    HDL 34 (L) 2023     Lab Results   Component Value Date    TRIG 133 2024    TRIG 298 (H) 10/18/2023    TRIG 275 (H) 2023     Lab Results   Component Value Date    NONHDLC 82 2024    NONHDLC 106 10/18/2023    NONHDLC 139 2023      Well controlled on simvastatin 20mg daily and fenofibrate.       3. Hypothyroidism, unspecified type  Assessment & Plan:  Continue levothyroxine.     Orders:  -     TSH, 3rd generation; Future  -     T4, free; Future    4. Coronary artery disease involving native coronary artery of native heart without angina pectoris  Assessment & Plan:  Follows cardiologist Dr. Bustamante.              Subjective      Hypertension  This is a chronic problem. The problem has been gradually worsening since onset. Associated symptoms include headaches. Pertinent negatives include no anxiety, blurred vision, chest pain, malaise/fatigue, neck pain, orthopnea, palpitations, peripheral edema, PND,  shortness of breath or sweats. Past treatments include angiotensin blockers, diuretics and beta blockers. There are no compliance problems.        Review of Systems   Constitutional:  Negative for malaise/fatigue.   Eyes:  Negative for blurred vision.   Respiratory:  Negative for shortness of breath.    Cardiovascular:  Negative for chest pain, palpitations, orthopnea and PND.   Musculoskeletal:  Negative for neck pain.   Neurological:  Positive for headaches.       Current Outpatient Medications on File Prior to Visit   Medication Sig    Biotin 2500 MCG CAPS Take by mouth daily    Cholecalciferol (VITAMIN D-3 PO) Take by mouth daily    fenofibrate (TRICOR) 145 mg tablet Take 145 mg by mouth daily    hydrochlorothiazide (HYDRODIURIL) 12.5 mg tablet Take 12.5 mg by mouth daily    metoprolol succinate (TOPROL-XL) 25 mg 24 hr tablet Take 1 tablet (25 mg total) by mouth daily    nitroglycerin (NITROSTAT) 0.4 mg SL tablet if needed    Saccharomyces boulardii (Probiotic) 250 MG CAPS Take by mouth    simvastatin (ZOCOR) 20 mg tablet Take 1 tablet (20 mg total) by mouth daily at bedtime    Synthroid 137 MCG tablet Take 1 tablet (137 mcg total) by mouth daily    [DISCONTINUED] diclofenac sodium (VOLTAREN) 1 % Apply 2 g topically 4 (four) times a day    [DISCONTINUED] polyethylene glycol-electrolytes (TriLyte) 4000 mL solution Take 4,000 mL by mouth once for 1 dose Take 4000 mL by mouth once for 1 dose. Use as directed    [DISCONTINUED] valsartan (DIOVAN) 160 mg tablet Take 1 tablet (160 mg total) by mouth daily    Vascepa 1 g CAPS TAKE 2 CAPSULES BY ORAL ROUTE 2 TIMES EVERY DAY WITH FOOD SWALLOWING WHOLE. DO NOT CHEW, OPEN, DISSOLVE AND/OR CRUSH. (Patient not taking: Reported on 4/2/2024)    [DISCONTINUED] ammonium lactate (LAC-HYDRIN) 12 % cream Apply topically as needed for dry skin (Patient not taking: Reported on 4/2/2024)    [DISCONTINUED] Cholecalciferol (Vitamin D3) 775363 UNIT/GM POWD Take 50 mcg by mouth daily  "(Patient not taking: Reported on 4/2/2024)    [DISCONTINUED] ondansetron (ZOFRAN) 4 mg tablet Take 1 tablet (4 mg total) by mouth every 8 (eight) hours as needed for nausea or vomiting for up to 10 doses. (Patient not taking: Reported on 4/2/2024)       Objective     /88   Pulse 63   Temp 98.6 °F (37 °C)   Resp 17   Ht 5' 1.5\" (1.562 m)   Wt 79.8 kg (176 lb)   BMI 32.72 kg/m²     Physical Exam  Constitutional:       General: She is not in acute distress.     Appearance: She is well-developed. She is not diaphoretic.   HENT:      Head: Normocephalic and atraumatic.   Cardiovascular:      Rate and Rhythm: Normal rate and regular rhythm.      Heart sounds: Normal heart sounds. No murmur heard.     No friction rub. No gallop.   Pulmonary:      Effort: Pulmonary effort is normal. No respiratory distress.      Breath sounds: Normal breath sounds. No wheezing or rales.   Chest:      Chest wall: No tenderness.   Musculoskeletal:         General: No deformity. Normal range of motion.      Cervical back: Normal range of motion and neck supple.   Skin:     General: Skin is warm and dry.   Neurological:      Mental Status: She is alert and oriented to person, place, and time.   Psychiatric:         Behavior: Behavior normal.         Thought Content: Thought content normal.         Judgment: Judgment normal.       Amina Anderson MD    "

## 2024-04-03 ENCOUNTER — TELEPHONE (OUTPATIENT)
Dept: FAMILY MEDICINE CLINIC | Facility: CLINIC | Age: 84
End: 2024-04-03

## 2024-04-03 DIAGNOSIS — E03.9 HYPOTHYROIDISM, UNSPECIFIED TYPE: Primary | ICD-10-CM

## 2024-04-03 NOTE — TELEPHONE ENCOUNTER
Called pt to discuss thyroid blood work. No answer. LMOM to call back.     If she does call back- can we please ask her if she is taking her thyroid medication (levothyroxine) correctly? (First thing in the morning on an empty stomach with nothing other than water. No other foods, beverages, medications, supplements/vitamins for at least 30 min to 1 hour after).   One of her thyroid levels (T4) is very slightly elevated. If she is not taking medication correctly, recommend that she start doing so. If she is, for now I recommend continuing the same dose of her thyroid medication and rechecking blood work in 3 months. Orders for repeat blood work are in chart.     Thank you.

## 2024-04-12 NOTE — TELEPHONE ENCOUNTER
Spoke to patient regarding 's message.Patient stated she has been taking the thyroid medication along with her other meds.Advised patient to take the thyroid medication first thing in the morning on an empty stomach with nothing other than water   No other foods, beverages, medications, supplements/vitamins for at least 30 min to 1 hour after).   continuing the same dose of her thyroid medication and rechecking blood work in 3 months. Patient aware and understands.

## 2024-04-12 NOTE — TELEPHONE ENCOUNTER
Pt called back in regards to her bloodwork results. Transferred pt over to [facility] (pritesh).

## 2024-05-17 ENCOUNTER — APPOINTMENT (OUTPATIENT)
Dept: LAB | Facility: CLINIC | Age: 84
End: 2024-05-17
Payer: MEDICARE

## 2024-05-17 ENCOUNTER — TRANSCRIBE ORDERS (OUTPATIENT)
Dept: LAB | Facility: CLINIC | Age: 84
End: 2024-05-17

## 2024-05-17 DIAGNOSIS — Z79.899 ENCOUNTER FOR LONG-TERM (CURRENT) USE OF OTHER MEDICATIONS: ICD-10-CM

## 2024-05-17 DIAGNOSIS — E78.1 HYPERTRIGLYCERIDEMIA: ICD-10-CM

## 2024-05-17 DIAGNOSIS — E78.00 HYPERCHOLESTEROLEMIA: Primary | ICD-10-CM

## 2024-05-17 DIAGNOSIS — E78.00 HYPERCHOLESTEROLEMIA: ICD-10-CM

## 2024-05-17 LAB
ALBUMIN SERPL BCP-MCNC: 4.2 G/DL (ref 3.5–5)
ALP SERPL-CCNC: 37 U/L (ref 34–104)
ALT SERPL W P-5'-P-CCNC: 13 U/L (ref 7–52)
ANION GAP SERPL CALCULATED.3IONS-SCNC: 9 MMOL/L (ref 4–13)
AST SERPL W P-5'-P-CCNC: 19 U/L (ref 13–39)
BILIRUB DIRECT SERPL-MCNC: 0.09 MG/DL (ref 0–0.2)
BILIRUB SERPL-MCNC: 0.38 MG/DL (ref 0.2–1)
BUN SERPL-MCNC: 23 MG/DL (ref 5–25)
CALCIUM SERPL-MCNC: 9.5 MG/DL (ref 8.4–10.2)
CHLORIDE SERPL-SCNC: 102 MMOL/L (ref 96–108)
CHOLEST SERPL-MCNC: 119 MG/DL
CK SERPL-CCNC: 83 U/L (ref 26–192)
CO2 SERPL-SCNC: 30 MMOL/L (ref 21–32)
CREAT SERPL-MCNC: 1.14 MG/DL (ref 0.6–1.3)
GFR SERPL CREATININE-BSD FRML MDRD: 43 ML/MIN/1.73SQ M
GLUCOSE P FAST SERPL-MCNC: 110 MG/DL (ref 65–99)
HDLC SERPL-MCNC: 47 MG/DL
LDLC SERPL CALC-MCNC: 49 MG/DL (ref 0–100)
NONHDLC SERPL-MCNC: 72 MG/DL
POTASSIUM SERPL-SCNC: 4.6 MMOL/L (ref 3.5–5.3)
PROT SERPL-MCNC: 7.2 G/DL (ref 6.4–8.4)
SODIUM SERPL-SCNC: 141 MMOL/L (ref 135–147)
TRIGL SERPL-MCNC: 113 MG/DL

## 2024-05-17 PROCEDURE — 80076 HEPATIC FUNCTION PANEL: CPT

## 2024-05-17 PROCEDURE — 36415 COLL VENOUS BLD VENIPUNCTURE: CPT

## 2024-05-17 PROCEDURE — 80048 BASIC METABOLIC PNL TOTAL CA: CPT

## 2024-05-17 PROCEDURE — 80061 LIPID PANEL: CPT

## 2024-05-17 PROCEDURE — 83036 HEMOGLOBIN GLYCOSYLATED A1C: CPT

## 2024-05-17 PROCEDURE — 82550 ASSAY OF CK (CPK): CPT

## 2024-05-18 LAB
EST. AVERAGE GLUCOSE BLD GHB EST-MCNC: 143 MG/DL
HBA1C MFR BLD: 6.6 %

## 2024-06-07 ENCOUNTER — TELEPHONE (OUTPATIENT)
Dept: GASTROENTEROLOGY | Facility: CLINIC | Age: 84
End: 2024-06-07

## 2024-06-07 NOTE — TELEPHONE ENCOUNTER
lmom confirming pt's colonoscopy scheduled on 6/14/24 at [facility] with Dr Nathan .  Informed [facility]  would be calling this pt with the arrival time.  Informed of clear liquid diet day prior as well as the bowel cleansing preparation.  Informed would need a  the day of the procedure due to being under sedation. I asked pt to please call back if has not received instructions or if has any questions.

## 2024-06-13 ENCOUNTER — HOSPITAL ENCOUNTER (OUTPATIENT)
Dept: RADIOLOGY | Facility: HOSPITAL | Age: 84
Discharge: HOME | End: 2024-06-13
Payer: MEDICARE

## 2024-06-13 DIAGNOSIS — M79.601 RIGHT UPPER LIMB PAIN: ICD-10-CM

## 2024-06-13 DIAGNOSIS — M54.9 DORSALGIA: ICD-10-CM

## 2024-06-13 PROCEDURE — 72148 MRI LUMBAR SPINE W/O DYE: CPT

## 2024-06-13 RX ORDER — SODIUM CHLORIDE, SODIUM LACTATE, POTASSIUM CHLORIDE, CALCIUM CHLORIDE 600; 310; 30; 20 MG/100ML; MG/100ML; MG/100ML; MG/100ML
75 INJECTION, SOLUTION INTRAVENOUS CONTINUOUS
Status: CANCELLED | OUTPATIENT
Start: 2024-06-13

## 2024-06-14 ENCOUNTER — HOSPITAL ENCOUNTER (OUTPATIENT)
Dept: GASTROENTEROLOGY | Facility: AMBULARY SURGERY CENTER | Age: 84
Setting detail: OUTPATIENT SURGERY
End: 2024-06-14
Attending: INTERNAL MEDICINE
Payer: MEDICARE

## 2024-06-14 ENCOUNTER — ANESTHESIA (OUTPATIENT)
Dept: GASTROENTEROLOGY | Facility: AMBULARY SURGERY CENTER | Age: 84
End: 2024-06-14

## 2024-06-14 ENCOUNTER — ANESTHESIA EVENT (OUTPATIENT)
Dept: GASTROENTEROLOGY | Facility: AMBULARY SURGERY CENTER | Age: 84
End: 2024-06-14

## 2024-06-14 VITALS
DIASTOLIC BLOOD PRESSURE: 57 MMHG | OXYGEN SATURATION: 96 % | SYSTOLIC BLOOD PRESSURE: 113 MMHG | TEMPERATURE: 98.1 F | RESPIRATION RATE: 16 BRPM | HEART RATE: 72 BPM

## 2024-06-14 DIAGNOSIS — R10.13 DYSPEPSIA: ICD-10-CM

## 2024-06-14 DIAGNOSIS — R19.4 CHANGE IN BOWEL HABIT: ICD-10-CM

## 2024-06-14 DIAGNOSIS — K29.70 GASTRITIS DETERMINED BY ENDOSCOPY: Primary | ICD-10-CM

## 2024-06-14 PROBLEM — K21.9 GASTROESOPHAGEAL REFLUX DISEASE: Status: ACTIVE | Noted: 2024-06-14

## 2024-06-14 PROBLEM — Z98.61 HISTORY OF PTCA: Status: ACTIVE | Noted: 2024-06-14

## 2024-06-14 PROCEDURE — 43239 EGD BIOPSY SINGLE/MULTIPLE: CPT | Performed by: INTERNAL MEDICINE

## 2024-06-14 PROCEDURE — 45380 COLONOSCOPY AND BIOPSY: CPT | Performed by: INTERNAL MEDICINE

## 2024-06-14 PROCEDURE — 43255 EGD CONTROL BLEEDING ANY: CPT | Performed by: INTERNAL MEDICINE

## 2024-06-14 PROCEDURE — 45385 COLONOSCOPY W/LESION REMOVAL: CPT | Performed by: INTERNAL MEDICINE

## 2024-06-14 PROCEDURE — 88305 TISSUE EXAM BY PATHOLOGIST: CPT | Performed by: PATHOLOGY

## 2024-06-14 RX ORDER — PROPOFOL 10 MG/ML
INJECTION, EMULSION INTRAVENOUS AS NEEDED
Status: DISCONTINUED | OUTPATIENT
Start: 2024-06-14 | End: 2024-06-14

## 2024-06-14 RX ORDER — LIDOCAINE HYDROCHLORIDE 10 MG/ML
INJECTION, SOLUTION EPIDURAL; INFILTRATION; INTRACAUDAL; PERINEURAL AS NEEDED
Status: DISCONTINUED | OUTPATIENT
Start: 2024-06-14 | End: 2024-06-14

## 2024-06-14 RX ORDER — OMEPRAZOLE 20 MG/1
20 CAPSULE, DELAYED RELEASE ORAL DAILY
Qty: 30 CAPSULE | Refills: 5 | Status: SHIPPED | OUTPATIENT
Start: 2024-06-14

## 2024-06-14 RX ORDER — SODIUM CHLORIDE, SODIUM LACTATE, POTASSIUM CHLORIDE, CALCIUM CHLORIDE 600; 310; 30; 20 MG/100ML; MG/100ML; MG/100ML; MG/100ML
75 INJECTION, SOLUTION INTRAVENOUS CONTINUOUS
Status: DISCONTINUED | OUTPATIENT
Start: 2024-06-14 | End: 2024-06-18 | Stop reason: HOSPADM

## 2024-06-14 RX ORDER — PROPOFOL 10 MG/ML
INJECTION, EMULSION INTRAVENOUS CONTINUOUS PRN
Status: DISCONTINUED | OUTPATIENT
Start: 2024-06-14 | End: 2024-06-14

## 2024-06-14 RX ADMIN — LIDOCAINE HYDROCHLORIDE 50 MG: 10 INJECTION, SOLUTION EPIDURAL; INFILTRATION; INTRACAUDAL; PERINEURAL at 08:21

## 2024-06-14 RX ADMIN — SODIUM CHLORIDE, SODIUM LACTATE, POTASSIUM CHLORIDE, AND CALCIUM CHLORIDE 75 ML/HR: .6; .31; .03; .02 INJECTION, SOLUTION INTRAVENOUS at 07:45

## 2024-06-14 RX ADMIN — PROPOFOL 50 MG: 10 INJECTION, EMULSION INTRAVENOUS at 08:29

## 2024-06-14 RX ADMIN — PROPOFOL 50 MG: 10 INJECTION, EMULSION INTRAVENOUS at 08:24

## 2024-06-14 RX ADMIN — PROPOFOL 80 MCG/KG/MIN: 10 INJECTION, EMULSION INTRAVENOUS at 08:41

## 2024-06-14 RX ADMIN — PROPOFOL 50 MG: 10 INJECTION, EMULSION INTRAVENOUS at 08:27

## 2024-06-14 RX ADMIN — PROPOFOL 50 MG: 10 INJECTION, EMULSION INTRAVENOUS at 08:34

## 2024-06-14 RX ADMIN — PROPOFOL 40 MG: 10 INJECTION, EMULSION INTRAVENOUS at 08:46

## 2024-06-14 RX ADMIN — PROPOFOL 100 MG: 10 INJECTION, EMULSION INTRAVENOUS at 08:21

## 2024-06-14 NOTE — H&P
History and Physical -  [facility]  Adelina Dyer 83 y.o. female MRN: [id number]                  HPI: Adelina Dyer is a 83 y.o. year old female who presents for dyspepsia, history of colon polyps      REVIEW OF SYSTEMS: Per the HPI, and otherwise unremarkable.    Historical Information   Past Medical History:   Diagnosis Date    Coronary artery disease     Hyperlipidemia     Hypertension     Hypothyroidism     MI, old     UTI (urinary tract infection) 07/26/2022     Past Surgical History:   Procedure Laterality Date    CHOLECYSTECTOMY      CORONARY ANGIOPLASTY      2001     [place] XCAPSL CTRC RMVL INSJ IO LENS PROSTH W/O ECP Right 8/1/2022    Procedure: EXTRACTION EXTRACAPSULAR CATARACT PHACO INTRAOCULAR LENS (IOL);  Surgeon: Finn Heredia MD;  Location: [facility] MAIN OR;  Service: Ophthalmology    AZ XCAPSL CTRC RMVL INSJ IO LENS PROSTH W/O ECP Left 10/17/2022    Procedure: EXTRACTION EXTRACAPSULAR CATARACT PHACO INTRAOCULAR LENS (IOL);  Surgeon: Finn Heredia MD;  Location: [facility] MAIN OR;  Service: Ophthalmology    TONSILLECTOMY       Social History   Social History     Substance and Sexual Activity   Alcohol Use No     Social History     Substance and Sexual Activity   Drug Use No     Social History     Tobacco Use   Smoking Status Never    Passive exposure: Past   Smokeless Tobacco Never     Family History   Problem Relation Age of Onset    Thyroid disease Mother     Lung cancer Mother     Cirrhosis Father     Heart attack Sister     No Known Problems Sister     No Known Problems Sister     No Known Problems Daughter     No Known Problems Daughter     No Known Problems Daughter     No Known Problems Maternal Aunt     No Known Problems Maternal Aunt     No Known Problems Maternal Aunt     No Known Problems Maternal Aunt     No Known Problems Maternal Aunt        Meds/Allergies       Current Outpatient Medications:     Biotin 2500 MCG CAPS    Cholecalciferol (VITAMIN D-3 PO)    fenofibrate  (TRICOR) 145 mg tablet    hydrochlorothiazide (HYDRODIURIL) 12.5 mg tablet    metoprolol succinate (TOPROL-XL) 25 mg 24 hr tablet    simvastatin (ZOCOR) 20 mg tablet    Synthroid 137 MCG tablet    valsartan (DIOVAN) 160 mg tablet    Vascepa 1 g CAPS    nitroglycerin (NITROSTAT) 0.4 mg SL tablet    Saccharomyces boulardii (Probiotic) 250 MG CAPS    Current Facility-Administered Medications:     lactated ringers infusion, 75 mL/hr, Intravenous, Continuous, 75 mL/hr at 06/14/24 0745    Allergies   Allergen Reactions    Iodinated Contrast Media Hives       Objective     /55   Pulse 72   Temp 98.1 °F (36.7 °C) (Temporal)   Resp 18   SpO2 98%       PHYSICAL EXAM    Gen: NAD  Head: NCAT  CV: RRR  CHEST: Clear  ABD: soft, NT/ND  EXT: no edema      ASSESSMENT/PLAN:  This is a 83 y.o. year old female here for EGD, colonoscopy, and she is stable and optimized for her procedure.

## 2024-06-14 NOTE — ANESTHESIA PREPROCEDURE EVALUATION
Procedure:  COLONOSCOPY  EGD    Relevant Problems   CARDIO   (+) CAD (coronary artery disease)   (+) HTN (hypertension)   (+) History of PTCA   (+) Hyperlipidemia   (+) MI (myocardial infarction) (HCC)      ENDO   (+) Hypothyroidism      GI/HEPATIC   (+) Gastroesophageal reflux disease        Physical Exam    Airway    Mallampati score: II  TM Distance: >3 FB  Neck ROM: full     Dental       Cardiovascular  Rhythm: regular, Rate: normal    Pulmonary   Breath sounds clear to auscultation    Other Findings  post-pubertal.      Anesthesia Plan  ASA Score- 3     Anesthesia Type- IV sedation with anesthesia with ASA Monitors.         Additional Monitors:     Airway Plan:            Plan Factors-    Chart reviewed.        Patient is not a current smoker.              Induction- intravenous.    Postoperative Plan-     Perioperative Resuscitation Plan - Level 1 - Full Code.       Informed Consent- Anesthetic plan and risks discussed with patient.

## 2024-06-14 NOTE — ANESTHESIA POSTPROCEDURE EVALUATION
Post-Op Assessment Note    CV Status:  Stable  Pain Score: 0    Pain management: adequate       Mental Status:  Sleepy   Hydration Status:  Euvolemic   PONV Controlled:  Controlled   Airway Patency:  Patent     Post Op Vitals Reviewed: Yes    No anethesia notable event occurred.    Staff: Anesthesiologist, CRNA               /59 (06/14/24 0854)    Temp      Pulse 67 (06/14/24 0854)   Resp 12 (06/14/24 0854)    SpO2 100 % (06/14/24 0854)

## 2024-06-17 ENCOUNTER — APPOINTMENT (OUTPATIENT)
Dept: RADIOLOGY | Facility: CLINIC | Age: 84
End: 2024-06-17
Payer: MEDICARE

## 2024-06-17 ENCOUNTER — OFFICE VISIT (OUTPATIENT)
Dept: FAMILY MEDICINE CLINIC | Facility: CLINIC | Age: 84
End: 2024-06-17
Payer: MEDICARE

## 2024-06-17 VITALS
SYSTOLIC BLOOD PRESSURE: 132 MMHG | HEIGHT: 62 IN | WEIGHT: 170 LBS | TEMPERATURE: 97.2 F | HEART RATE: 50 BPM | RESPIRATION RATE: 16 BRPM | DIASTOLIC BLOOD PRESSURE: 72 MMHG | BODY MASS INDEX: 31.28 KG/M2

## 2024-06-17 DIAGNOSIS — I10 PRIMARY HYPERTENSION: Primary | ICD-10-CM

## 2024-06-17 DIAGNOSIS — E03.9 HYPOTHYROIDISM, UNSPECIFIED TYPE: ICD-10-CM

## 2024-06-17 DIAGNOSIS — R07.9 RIGHT-SIDED CHEST PAIN: ICD-10-CM

## 2024-06-17 DIAGNOSIS — E78.2 MIXED HYPERLIPIDEMIA: ICD-10-CM

## 2024-06-17 PROCEDURE — 99214 OFFICE O/P EST MOD 30 MIN: CPT | Performed by: INTERNAL MEDICINE

## 2024-06-17 PROCEDURE — 71046 X-RAY EXAM CHEST 2 VIEWS: CPT

## 2024-06-17 PROCEDURE — G2211 COMPLEX E/M VISIT ADD ON: HCPCS | Performed by: INTERNAL MEDICINE

## 2024-06-17 RX ORDER — AMLODIPINE BESYLATE 5 MG/1
5 TABLET ORAL DAILY
COMMUNITY
Start: 2024-05-23

## 2024-06-17 NOTE — ASSESSMENT & PLAN NOTE
Reviewed labs with patient, lipids are well controlled on simvastatin and fenofibrate and will continue as ordered.

## 2024-06-17 NOTE — PROGRESS NOTES
Ambulatory Visit  Name: Adelina Dyer      : 1940      MRN: [id number]  Encounter Provider: Destinee Wong MD  Encounter Date: 2024   Encounter department: [facility]    Assessment & Plan   1. Primary hypertension  Assessment & Plan:  Well controlled on current medications and will continue.  2. Mixed hyperlipidemia  Assessment & Plan:  Reviewed labs with patient, lipids are well controlled on simvastatin and fenofibrate and will continue as ordered.   3. Hypothyroidism, unspecified type  Assessment & Plan:  Clinically euthyroid and TSH is normal, continue levothyroxine as ordered.   4. Right-sided chest pain  Comments:  Appears musculoskeletal, will check xray.  Orders:  -     XR chest pa & lateral; Future; Expected date: 2024         History of Present Illness     Here for follow up of hypertension and other issues.  Reviewed labwork ordered by her cardiologist.  She is trying to follow a healthy diet.  Has some intermittent right chest pain which feels like rib and is not exertional.  It hurts when she presses on it. There are no symptoms on inspiration.        Review of Systems   Constitutional: Negative.    Respiratory: Negative.  Negative for chest tightness and shortness of breath.    Cardiovascular:  Positive for chest pain. Negative for palpitations and leg swelling.     Past Medical History:   Diagnosis Date   • Coronary artery disease    • Hyperlipidemia    • Hypertension    • Hypothyroidism    • MI, old    • UTI (urinary tract infection) 2022     Past Surgical History:   Procedure Laterality Date   • CHOLECYSTECTOMY     • CORONARY ANGIOPLASTY          • IN XCAPSL CTRC RMVL INSJ IO LENS PROSTH W/O ECP Right 2022    Procedure: EXTRACTION EXTRACAPSULAR CATARACT PHACO INTRAOCULAR LENS (IOL);  Surgeon: Finn Heredia MD;  Location: [facility] MAIN OR;  Service: Ophthalmology   • IN XCAPSL CTRC RMVL INSJ IO LENS PROSTH W/O ECP Left 10/17/2022    Procedure:  EXTRACTION EXTRACAPSULAR CATARACT PHACO INTRAOCULAR LENS (IOL);  Surgeon: Finn Heredia MD;  Location: [facility] MAIN OR;  Service: Ophthalmology   • TONSILLECTOMY       Family History   Problem Relation Age of Onset   • Thyroid disease Mother    • Lung cancer Mother    • Cirrhosis Father    • Heart attack Sister    • No Known Problems Sister    • No Known Problems Sister    • No Known Problems Daughter    • No Known Problems Daughter    • No Known Problems Daughter    • No Known Problems Maternal Aunt    • No Known Problems Maternal Aunt    • No Known Problems Maternal Aunt    • No Known Problems Maternal Aunt    • No Known Problems Maternal Aunt      Social History     Tobacco Use   • Smoking status: Never     Passive exposure: Past   • Smokeless tobacco: Never   Vaping Use   • Vaping status: Never Used   Substance and Sexual Activity   • Alcohol use: No   • Drug use: No   • Sexual activity: Not on file     Current Outpatient Medications on File Prior to Visit   Medication Sig   • amLODIPine (NORVASC) 5 mg tablet Take 5 mg by mouth daily   • Biotin 2500 MCG CAPS Take by mouth daily   • Cholecalciferol (VITAMIN D-3 PO) Take by mouth daily   • fenofibrate (TRICOR) 145 mg tablet Take 145 mg by mouth daily   • hydrochlorothiazide (HYDRODIURIL) 12.5 mg tablet Take 12.5 mg by mouth daily   • metFORMIN (GLUCOPHAGE) 500 mg tablet 500 mg 2 (two) times a day   • metoprolol succinate (TOPROL-XL) 25 mg 24 hr tablet Take 1 tablet (25 mg total) by mouth daily   • nitroglycerin (NITROSTAT) 0.4 mg SL tablet if needed   • omeprazole (PriLOSEC) 20 mg delayed release capsule Take 1 capsule (20 mg total) by mouth daily   • Saccharomyces boulardii (Probiotic) 250 MG CAPS Take by mouth   • simvastatin (ZOCOR) 20 mg tablet Take 1 tablet (20 mg total) by mouth daily at bedtime   • Synthroid 137 MCG tablet Take 1 tablet (137 mcg total) by mouth daily   • valsartan (DIOVAN) 160 mg tablet Take 2 tablets (320 mg total) by mouth daily   •  "Vascepa 1 g CAPS      Allergies   Allergen Reactions   • Iodinated Contrast Media Hives     Immunization History   Administered Date(s) Administered   • COVID-19 MODERNA VACC 0.5 ML IM 08/03/2021, 08/31/2021   • Influenza Split High Dose Preservative Free IM 09/28/2018     Objective     /72   Pulse (!) 50   Temp (!) 97.2 °F (36.2 °C) (Temporal)   Resp 16   Ht 5' 1.5\" (1.562 m)   Wt 77.1 kg (170 lb)   BMI 31.60 kg/m²     Physical Exam  Constitutional:       Appearance: Normal appearance.   HENT:      Head: Normocephalic and atraumatic.   Eyes:      Pupils: Pupils are equal, round, and reactive to light.   Cardiovascular:      Rate and Rhythm: Normal rate and regular rhythm.      Heart sounds: No murmur heard.     No friction rub. No gallop.   Pulmonary:      Effort: Pulmonary effort is normal.      Breath sounds: Normal breath sounds. No wheezing or rales.   Chest:       Abdominal:      General: Abdomen is flat. Bowel sounds are normal.      Palpations: Abdomen is soft.      Tenderness: There is no abdominal tenderness.   Musculoskeletal:         General: No swelling.   Neurological:      Mental Status: She is alert.       Administrative Statements         "

## 2024-06-21 PROCEDURE — 88305 TISSUE EXAM BY PATHOLOGIST: CPT | Performed by: PATHOLOGY

## 2024-07-11 ENCOUNTER — TELEPHONE (OUTPATIENT)
Dept: GASTROENTEROLOGY | Facility: CLINIC | Age: 84
End: 2024-07-11

## 2024-07-11 NOTE — TELEPHONE ENCOUNTER
Patients GI provider:  Dr. Nathan    Number to return call: [phone]    Reason for call: Pt calling for results of biopsy from egd and colon on 6/14. Pt also states she was put on omeprazole for a ulcer and is almost out of it. Wants to know how long she should be taking this for?    Scheduled procedure/appointment date if applicable: none

## 2024-07-16 NOTE — TELEPHONE ENCOUNTER
Patient called in she has not heard anything from the message she left she would like a call back regarding the message for results and medication Omeprazole

## 2024-07-18 ENCOUNTER — TELEPHONE (OUTPATIENT)
Dept: GASTROENTEROLOGY | Facility: CLINIC | Age: 84
End: 2024-07-18

## 2024-07-18 NOTE — TELEPHONE ENCOUNTER
All results and recommendations reviewed with pt, however unsure if pt hung up when done or if phone call was disconnected. Pt verbalized understanding before disconnect.

## 2024-07-18 NOTE — TELEPHONE ENCOUNTER
----- Message from Matthew Nathan MD sent at 7/18/2024 10:28 AM EDT -----  Please call the patient regarding result.  Biopsies all benign.  No evidence of significant gastric inflammation, infection or precancerous changes.  Colon polyps were benign adenomas.  If her abdominal discomfort has improved with omeprazole would recommend she continue this, otherwise she can stop.  No need for repeat EGD or colonoscopy

## 2024-08-06 DIAGNOSIS — I10 PRIMARY HYPERTENSION: ICD-10-CM

## 2024-08-06 RX ORDER — METOPROLOL SUCCINATE 25 MG/1
25 TABLET, EXTENDED RELEASE ORAL DAILY
Qty: 100 TABLET | Refills: 1 | Status: SHIPPED | OUTPATIENT
Start: 2024-08-06

## 2024-10-04 ENCOUNTER — APPOINTMENT (EMERGENCY)
Dept: RADIOLOGY | Facility: HOSPITAL | Age: 84
DRG: 149 | End: 2024-10-04
Payer: MEDICARE

## 2024-10-04 ENCOUNTER — HOSPITAL ENCOUNTER (INPATIENT)
Facility: HOSPITAL | Age: 84
LOS: 1 days | Discharge: HOME | DRG: 149 | End: 2024-10-07
Attending: EMERGENCY MEDICINE
Payer: MEDICARE

## 2024-10-04 DIAGNOSIS — J32.4 PANSINUSITIS: ICD-10-CM

## 2024-10-04 DIAGNOSIS — E03.9 HYPOTHYROIDISM, UNSPECIFIED TYPE: ICD-10-CM

## 2024-10-04 DIAGNOSIS — R42 DIZZINESS: Primary | ICD-10-CM

## 2024-10-04 DIAGNOSIS — R42 LIGHTHEADEDNESS: ICD-10-CM

## 2024-10-04 PROBLEM — E11.9 TYPE 2 DIABETES MELLITUS (HCC): Status: ACTIVE | Noted: 2024-10-04

## 2024-10-04 PROBLEM — N18.30 CHRONIC KIDNEY DISEASE, STAGE 3 (HCC): Status: ACTIVE | Noted: 2024-10-04

## 2024-10-04 PROBLEM — R93.89 ABNORMAL CT SCAN: Status: ACTIVE | Noted: 2024-10-04

## 2024-10-04 LAB
2HR DELTA HS TROPONIN: 0 NG/L
ALBUMIN SERPL BCG-MCNC: 4.1 G/DL (ref 3.5–5)
ALP SERPL-CCNC: 39 U/L (ref 34–104)
ALT SERPL W P-5'-P-CCNC: 13 U/L (ref 7–52)
ANION GAP SERPL CALCULATED.3IONS-SCNC: 7 MMOL/L (ref 4–13)
AST SERPL W P-5'-P-CCNC: 18 U/L (ref 13–39)
BASOPHILS # BLD AUTO: 0.07 THOUSANDS/ΜL (ref 0–0.1)
BASOPHILS NFR BLD AUTO: 1 % (ref 0–1)
BILIRUB SERPL-MCNC: 0.51 MG/DL (ref 0.2–1)
BUN SERPL-MCNC: 18 MG/DL (ref 5–25)
CALCIUM SERPL-MCNC: 9.7 MG/DL (ref 8.4–10.2)
CARDIAC TROPONIN I PNL SERPL HS: 7 NG/L
CARDIAC TROPONIN I PNL SERPL HS: 7 NG/L
CHLORIDE SERPL-SCNC: 101 MMOL/L (ref 96–108)
CO2 SERPL-SCNC: 27 MMOL/L (ref 21–32)
CREAT SERPL-MCNC: 1.1 MG/DL (ref 0.6–1.3)
EOSINOPHIL # BLD AUTO: 0.59 THOUSAND/ΜL (ref 0–0.61)
EOSINOPHIL NFR BLD AUTO: 6 % (ref 0–6)
ERYTHROCYTE [DISTWIDTH] IN BLOOD BY AUTOMATED COUNT: 13.2 % (ref 11.6–15.1)
EST. AVERAGE GLUCOSE BLD GHB EST-MCNC: 131 MG/DL
GFR SERPL CREATININE-BSD FRML MDRD: 45 ML/MIN/1.73SQ M
GLUCOSE SERPL-MCNC: 106 MG/DL (ref 65–140)
GLUCOSE SERPL-MCNC: 144 MG/DL (ref 65–140)
GLUCOSE SERPL-MCNC: 98 MG/DL (ref 65–140)
HBA1C MFR BLD: 6.2 %
HCT VFR BLD AUTO: 38.7 % (ref 34.8–46.1)
HGB BLD-MCNC: 12.4 G/DL (ref 11.5–15.4)
IMM GRANULOCYTES # BLD AUTO: 0.02 THOUSAND/UL (ref 0–0.2)
IMM GRANULOCYTES NFR BLD AUTO: 0 % (ref 0–2)
LYMPHOCYTES # BLD AUTO: 5.11 THOUSANDS/ΜL (ref 0.6–4.47)
LYMPHOCYTES NFR BLD AUTO: 52 % (ref 14–44)
MCH RBC QN AUTO: 30 PG (ref 26.8–34.3)
MCHC RBC AUTO-ENTMCNC: 32 G/DL (ref 31.4–37.4)
MCV RBC AUTO: 94 FL (ref 82–98)
MONOCYTES # BLD AUTO: 0.72 THOUSAND/ΜL (ref 0.17–1.22)
MONOCYTES NFR BLD AUTO: 7 % (ref 4–12)
NEUTROPHILS # BLD AUTO: 3.34 THOUSANDS/ΜL (ref 1.85–7.62)
NEUTS SEG NFR BLD AUTO: 34 % (ref 43–75)
NRBC BLD AUTO-RTO: 0 /100 WBCS
PLATELET # BLD AUTO: 235 THOUSANDS/UL (ref 149–390)
PMV BLD AUTO: 10.1 FL (ref 8.9–12.7)
POTASSIUM SERPL-SCNC: 3.8 MMOL/L (ref 3.5–5.3)
PROCALCITONIN SERPL-MCNC: <0.05 NG/ML
PROT SERPL-MCNC: 7.1 G/DL (ref 6.4–8.4)
RBC # BLD AUTO: 4.14 MILLION/UL (ref 3.81–5.12)
SODIUM SERPL-SCNC: 135 MMOL/L (ref 135–147)
T4 FREE SERPL-MCNC: 1.51 NG/DL (ref 0.61–1.12)
TSH SERPL DL<=0.05 MIU/L-ACNC: 0.2 UIU/ML (ref 0.45–4.5)
TSH SERPL DL<=0.05 MIU/L-ACNC: 0.21 UIU/ML (ref 0.45–4.5)
WBC # BLD AUTO: 9.85 THOUSAND/UL (ref 4.31–10.16)

## 2024-10-04 PROCEDURE — 84443 ASSAY THYROID STIM HORMONE: CPT | Performed by: EMERGENCY MEDICINE

## 2024-10-04 PROCEDURE — 80053 COMPREHEN METABOLIC PANEL: CPT

## 2024-10-04 PROCEDURE — 96375 TX/PRO/DX INJ NEW DRUG ADDON: CPT

## 2024-10-04 PROCEDURE — 84145 PROCALCITONIN (PCT): CPT | Performed by: INTERNAL MEDICINE

## 2024-10-04 PROCEDURE — 85025 COMPLETE CBC W/AUTO DIFF WBC: CPT

## 2024-10-04 PROCEDURE — 84439 ASSAY OF FREE THYROXINE: CPT | Performed by: EMERGENCY MEDICINE

## 2024-10-04 PROCEDURE — 93005 ELECTROCARDIOGRAM TRACING: CPT

## 2024-10-04 PROCEDURE — 99285 EMERGENCY DEPT VISIT HI MDM: CPT

## 2024-10-04 PROCEDURE — 84484 ASSAY OF TROPONIN QUANT: CPT

## 2024-10-04 PROCEDURE — 36415 COLL VENOUS BLD VENIPUNCTURE: CPT

## 2024-10-04 PROCEDURE — 82948 REAGENT STRIP/BLOOD GLUCOSE: CPT

## 2024-10-04 PROCEDURE — 96374 THER/PROPH/DIAG INJ IV PUSH: CPT

## 2024-10-04 PROCEDURE — 70450 CT HEAD/BRAIN W/O DYE: CPT

## 2024-10-04 PROCEDURE — 84443 ASSAY THYROID STIM HORMONE: CPT | Performed by: INTERNAL MEDICINE

## 2024-10-04 PROCEDURE — 96361 HYDRATE IV INFUSION ADD-ON: CPT

## 2024-10-04 PROCEDURE — 83036 HEMOGLOBIN GLYCOSYLATED A1C: CPT | Performed by: INTERNAL MEDICINE

## 2024-10-04 PROCEDURE — 99223 1ST HOSP IP/OBS HIGH 75: CPT | Performed by: INTERNAL MEDICINE

## 2024-10-04 PROCEDURE — 99285 EMERGENCY DEPT VISIT HI MDM: CPT | Performed by: EMERGENCY MEDICINE

## 2024-10-04 PROCEDURE — 71045 X-RAY EXAM CHEST 1 VIEW: CPT

## 2024-10-04 RX ORDER — PANTOPRAZOLE SODIUM 40 MG/1
40 TABLET, DELAYED RELEASE ORAL ONCE
Status: COMPLETED | OUTPATIENT
Start: 2024-10-04 | End: 2024-10-04

## 2024-10-04 RX ORDER — MECLIZINE HYDROCHLORIDE 25 MG/1
25 TABLET ORAL ONCE
Status: COMPLETED | OUTPATIENT
Start: 2024-10-04 | End: 2024-10-04

## 2024-10-04 RX ORDER — ONDANSETRON 2 MG/ML
4 INJECTION INTRAMUSCULAR; INTRAVENOUS EVERY 4 HOURS PRN
Status: DISCONTINUED | OUTPATIENT
Start: 2024-10-04 | End: 2024-10-07 | Stop reason: HOSPADM

## 2024-10-04 RX ORDER — OMEGA-3-ACID ETHYL ESTERS 1 G/1
2 CAPSULE, LIQUID FILLED ORAL DAILY
Status: DISCONTINUED | OUTPATIENT
Start: 2024-10-05 | End: 2024-10-05 | Stop reason: CLARIF

## 2024-10-04 RX ORDER — AMLODIPINE BESYLATE 5 MG/1
5 TABLET ORAL DAILY
Status: DISCONTINUED | OUTPATIENT
Start: 2024-10-05 | End: 2024-10-07 | Stop reason: HOSPADM

## 2024-10-04 RX ORDER — HEPARIN SODIUM 5000 [USP'U]/ML
5000 INJECTION, SOLUTION INTRAVENOUS; SUBCUTANEOUS EVERY 8 HOURS SCHEDULED
Status: DISCONTINUED | OUTPATIENT
Start: 2024-10-04 | End: 2024-10-07 | Stop reason: HOSPADM

## 2024-10-04 RX ORDER — NITROGLYCERIN 0.4 MG/1
0.4 TABLET SUBLINGUAL
Status: DISCONTINUED | OUTPATIENT
Start: 2024-10-04 | End: 2024-10-07 | Stop reason: HOSPADM

## 2024-10-04 RX ORDER — DIAZEPAM 10 MG/2ML
5 INJECTION, SOLUTION INTRAMUSCULAR; INTRAVENOUS ONCE
Status: COMPLETED | OUTPATIENT
Start: 2024-10-04 | End: 2024-10-04

## 2024-10-04 RX ORDER — FENOFIBRATE 145 MG/1
145 TABLET, COATED ORAL DAILY
Status: DISCONTINUED | OUTPATIENT
Start: 2024-10-05 | End: 2024-10-07 | Stop reason: HOSPADM

## 2024-10-04 RX ORDER — ACETAMINOPHEN 10 MG/ML
1000 INJECTION, SOLUTION INTRAVENOUS EVERY 6 HOURS PRN
Status: DISCONTINUED | OUTPATIENT
Start: 2024-10-04 | End: 2024-10-07

## 2024-10-04 RX ORDER — HYDROCHLOROTHIAZIDE 12.5 MG/1
12.5 TABLET ORAL DAILY
Status: DISCONTINUED | OUTPATIENT
Start: 2024-10-05 | End: 2024-10-07 | Stop reason: HOSPADM

## 2024-10-04 RX ORDER — MECLIZINE HYDROCHLORIDE 25 MG/1
25 TABLET ORAL 3 TIMES DAILY PRN
Qty: 30 TABLET | Refills: 0 | Status: SHIPPED | OUTPATIENT
Start: 2024-10-04

## 2024-10-04 RX ORDER — METOPROLOL SUCCINATE 25 MG/1
25 TABLET, EXTENDED RELEASE ORAL DAILY
Status: DISCONTINUED | OUTPATIENT
Start: 2024-10-05 | End: 2024-10-07 | Stop reason: HOSPADM

## 2024-10-04 RX ORDER — SACCHAROMYCES BOULARDII 250 MG
250 CAPSULE ORAL 2 TIMES DAILY
Status: DISCONTINUED | OUTPATIENT
Start: 2024-10-04 | End: 2024-10-07 | Stop reason: HOSPADM

## 2024-10-04 RX ORDER — LOSARTAN POTASSIUM 50 MG/1
100 TABLET ORAL DAILY
Status: DISCONTINUED | OUTPATIENT
Start: 2024-10-05 | End: 2024-10-07 | Stop reason: HOSPADM

## 2024-10-04 RX ORDER — PANTOPRAZOLE SODIUM 40 MG/1
40 TABLET, DELAYED RELEASE ORAL
Status: DISCONTINUED | OUTPATIENT
Start: 2024-10-05 | End: 2024-10-07 | Stop reason: HOSPADM

## 2024-10-04 RX ORDER — INSULIN LISPRO 100 [IU]/ML
1-6 INJECTION, SOLUTION INTRAVENOUS; SUBCUTANEOUS
Status: DISCONTINUED | OUTPATIENT
Start: 2024-10-04 | End: 2024-10-07 | Stop reason: HOSPADM

## 2024-10-04 RX ORDER — ONDANSETRON 2 MG/ML
4 INJECTION INTRAMUSCULAR; INTRAVENOUS ONCE
Status: COMPLETED | OUTPATIENT
Start: 2024-10-04 | End: 2024-10-04

## 2024-10-04 RX ORDER — PRAVASTATIN SODIUM 40 MG
40 TABLET ORAL
Status: DISCONTINUED | OUTPATIENT
Start: 2024-10-04 | End: 2024-10-07 | Stop reason: HOSPADM

## 2024-10-04 RX ADMIN — PRAVASTATIN SODIUM 40 MG: 40 TABLET ORAL at 17:44

## 2024-10-04 RX ADMIN — Medication 250 MG: at 17:44

## 2024-10-04 RX ADMIN — DIAZEPAM 5 MG: 5 INJECTION INTRAMUSCULAR; INTRAVENOUS at 14:10

## 2024-10-04 RX ADMIN — MECLIZINE HYDROCHLORIDE 25 MG: 25 TABLET ORAL at 13:14

## 2024-10-04 RX ADMIN — ONDANSETRON 4 MG: 2 INJECTION INTRAMUSCULAR; INTRAVENOUS at 14:10

## 2024-10-04 RX ADMIN — AMPICILLIN SODIUM AND SULBACTAM SODIUM 3 G: 2; 1 INJECTION, POWDER, FOR SOLUTION INTRAMUSCULAR; INTRAVENOUS at 15:47

## 2024-10-04 RX ADMIN — ONDANSETRON 4 MG: 2 INJECTION INTRAMUSCULAR; INTRAVENOUS at 10:20

## 2024-10-04 RX ADMIN — SODIUM CHLORIDE 1000 ML: 0.9 INJECTION, SOLUTION INTRAVENOUS at 10:55

## 2024-10-04 RX ADMIN — PANTOPRAZOLE SODIUM 40 MG: 40 TABLET, DELAYED RELEASE ORAL at 16:29

## 2024-10-04 RX ADMIN — ONDANSETRON 4 MG: 2 INJECTION INTRAMUSCULAR; INTRAVENOUS at 22:32

## 2024-10-04 NOTE — ED PROVIDER NOTES
Final diagnoses:   Lightheadedness   Dizziness   Pansinusitis     ED Disposition       ED Disposition   Admit    Condition   Stable    Date/Time   Fri Oct 4, 2024  3:23 PM    Comment   Case was discussed with hospitalist and the patient's admission status was agreed to be observation to the service of Dr. Kelly.               Assessment & Plan       Medical Decision Making  Patient describes nonvertiginous lightheadedness and disequilibrium which is positional.  She much improved with hydration and feels much better.    Initial plan for discharge however as patient stood to leave the ED she felt much worse than before, became nauseous and vomited and was unable to stand or walk.  CT scan ordered, will admit for observation and hydration    Amount and/or Complexity of Data Reviewed  Labs: ordered.  Radiology: ordered.    Risk  Prescription drug management.  Decision regarding hospitalization.             Medications   ampicillin-sulbactam (UNASYN) 3 g in sodium chloride 0.9 % 100 mL IVPB (has no administration in time range)   ondansetron (ZOFRAN) injection 4 mg (4 mg Intravenous Given 10/4/24 1020)   sodium chloride 0.9 % bolus 1,000 mL (0 mL Intravenous Stopped 10/4/24 1319)   meclizine (ANTIVERT) tablet 25 mg (25 mg Oral Given 10/4/24 1314)   ondansetron (ZOFRAN) injection 4 mg (4 mg Intravenous Given 10/4/24 1410)   diazepam (VALIUM) injection 5 mg (5 mg Intravenous Given 10/4/24 1410)       ED Risk Strat Scores                                               History of Present Illness       Chief Complaint   Patient presents with    Dizziness    Nausea     Pt woke up with dizziness about an hour ago 0845 worse with movement and nauseas       Past Medical History:   Diagnosis Date    Coronary artery disease     Hyperlipidemia     Hypertension     Hypothyroidism     MI, old     UTI (urinary tract infection) 07/26/2022      Past Surgical History:   Procedure Laterality Date    CHOLECYSTECTOMY      CORONARY  ANGIOPLASTY      2001     [place] XCAPSL CTRC RMVL INSJ IO LENS PROSTH W/O ECP Right 8/1/2022    Procedure: EXTRACTION EXTRACAPSULAR CATARACT PHACO INTRAOCULAR LENS (IOL);  Surgeon: Finn Heredia MD;  Location: [facility] MAIN OR;  Service: Ophthalmology    NV XCAPSL CTRC RMVL INSJ IO LENS PROSTH W/O ECP Left 10/17/2022    Procedure: EXTRACTION EXTRACAPSULAR CATARACT PHACO INTRAOCULAR LENS (IOL);  Surgeon: Finn Heredia MD;  Location: [facility] MAIN OR;  Service: Ophthalmology    TONSILLECTOMY        Family History   Problem Relation Age of Onset    Thyroid disease Mother     Lung cancer Mother     Cirrhosis Father     Heart attack Sister     No Known Problems Sister     No Known Problems Sister     No Known Problems Daughter     No Known Problems Daughter     No Known Problems Daughter     No Known Problems Maternal Aunt     No Known Problems Maternal Aunt     No Known Problems Maternal Aunt     No Known Problems Maternal Aunt     No Known Problems Maternal Aunt       Social History     Tobacco Use    Smoking status: Never     Passive exposure: Past    Smokeless tobacco: Never   Vaping Use    Vaping status: Never Used   Substance Use Topics    Alcohol use: No    Drug use: No      E-Cigarette/Vaping    E-Cigarette Use Never User       E-Cigarette/Vaping Substances    Nicotine No     THC No     CBD No     Flavoring No     Other No     Unknown No       I have reviewed and agree with the history as documented.     Patient states she had mild dizziness and lightheadedness starting last night however it resolved without treatment and was able to sleep.  Patient woke well this morning but soon after rising she once again developed lightheadedness which she describes as not room spinning.  Patient had disequilibrium and had to hold on for fear of falling.  She states the symptoms are worse with position change especially getting up from the recumbent position.  It was associated with nausea and vomiting.  Denies headache.  Denies  chest pain abdominal pain.  No visual changes        Review of Systems   Constitutional:  Negative for chills and fever.   HENT:  Negative for congestion.    Eyes:  Negative for visual disturbance.   Respiratory:  Negative for cough and shortness of breath.    Cardiovascular:  Negative for chest pain and palpitations.   Gastrointestinal:  Positive for nausea and vomiting. Negative for abdominal pain.   Genitourinary:  Negative for dysuria.   Musculoskeletal:  Positive for gait problem. Negative for arthralgias and back pain.   Skin:  Negative for rash.   Neurological:  Positive for dizziness, weakness and light-headedness. Negative for facial asymmetry and headaches.   Hematological:  Does not bruise/bleed easily.   Psychiatric/Behavioral:  Negative for confusion.    All other systems reviewed and are negative.          Objective       ED Triage Vitals   Temperature Pulse Blood Pressure Respirations SpO2 Patient Position - Orthostatic VS   10/04/24 0951 10/04/24 0951 10/04/24 0951 10/04/24 0947 10/04/24 0951 10/04/24 0951   98.8 °F (37.1 °C) (!) 54 133/63 20 98 % Sitting      Temp src Heart Rate Source BP Location FiO2 (%) Pain Score    -- 10/04/24 0951 10/04/24 0951 -- 10/04/24 1020     Monitor Right arm  No Pain      Vitals      Date and Time Temp Pulse SpO2 Resp BP Pain Score FACES Pain Rating User   10/04/24 1330 -- 64 95 % 18 129/62 -- -- HD   10/04/24 1317 -- 60 97 % 16 133/63 -- -- KR   10/04/24 1127 -- 50 96 % 14 138/65 -- -- SB   10/04/24 1042 -- -- -- 16 -- -- -- SB   10/04/24 1027 -- 54 86 % -- 139/64 -- -- SB   10/04/24 1020 98.8 °F (37.1 °C) 52 95 % 17 133/63 No Pain -- SB   10/04/24 0951 98.8 °F (37.1 °C) 54 98 % 20 133/63 -- -- MARIELA   10/04/24 0947 -- -- -- 20 -- -- -- MARIELA            Physical Exam  Vitals and nursing note reviewed.   Constitutional:       Appearance: Normal appearance.   HENT:      Head: Normocephalic.      Right Ear: External ear normal.      Left Ear: External ear normal.      Nose:  Nose normal.      Mouth/Throat:      Mouth: Mucous membranes are moist.   Eyes:      Conjunctiva/sclera: Conjunctivae normal.   Neck:      Vascular: No carotid bruit.   Cardiovascular:      Rate and Rhythm: Normal rate and regular rhythm.      Pulses: Normal pulses.   Pulmonary:      Effort: Pulmonary effort is normal.      Breath sounds: Normal breath sounds.   Abdominal:      Palpations: Abdomen is soft.      Tenderness: There is no abdominal tenderness.   Musculoskeletal:         General: Normal range of motion.      Cervical back: Normal range of motion.   Skin:     General: Skin is warm and dry.      Capillary Refill: Capillary refill takes less than 2 seconds.   Neurological:      General: No focal deficit present.      Mental Status: She is alert.   Psychiatric:         Mood and Affect: Mood normal.         Results Reviewed       Procedure Component Value Units Date/Time    HS Troponin I 2hr [[id number]]  (Normal) Collected: 10/04/24 1204    Lab Status: Final result Specimen: Blood from Arm, Left Updated: 10/04/24 1232     hs TnI 2hr 7 ng/L      Delta 2hr hsTnI 0 ng/L     TSH, 3rd generation with Free T4 reflex [[id number]]  (Abnormal) Collected: 10/04/24 1002    Lab Status: Final result Specimen: Blood from Arm, Left Updated: 10/04/24 1213     TSH 3RD GENERATON 0.214 uIU/mL     T4, free [[id number]] Collected: 10/04/24 1002    Lab Status: In process Specimen: Blood from Arm, Left Updated: 10/04/24 1213    Comprehensive metabolic panel [[id number]]  (Abnormal) Collected: 10/04/24 1002    Lab Status: Final result Specimen: Blood from Arm, Left Updated: 10/04/24 1050     Sodium 135 mmol/L      Potassium 3.8 mmol/L      Chloride 101 mmol/L      CO2 27 mmol/L      ANION GAP 7 mmol/L      BUN 18 mg/dL      Creatinine 1.10 mg/dL      Glucose 144 mg/dL      Calcium 9.7 mg/dL      AST 18 U/L      ALT 13 U/L      Alkaline Phosphatase 39 U/L      Total Protein 7.1 g/dL      Albumin 4.1 g/dL      Total Bilirubin 0.51 mg/dL       eGFR 45 ml/min/1.73sq m     Narrative:      National Kid[employer] Foundation guidelines for Chronic Kidney Disease (CKD):     Stage 1 with normal or high GFR (GFR > 90 mL/min/1.73 square meters)    Stage 2 Mild CKD (GFR = 60-89 mL/min/1.73 square meters)    Stage 3A Moderate CKD (GFR = 45-59 mL/min/1.73 square meters)    Stage 3B Moderate CKD (GFR = 30-44 mL/min/1.73 square meters)    Stage 4 Severe CKD (GFR = 15-29 mL/min/1.73 square meters)    Stage 5 End Stage CKD (GFR <15 mL/min/1.73 square meters)  Note: GFR calculation is accurate only with a steady state creatinine    HS Troponin 0hr (reflex protocol) [[id number]]  (Normal) Collected: 10/04/24 1002    Lab Status: Final result Specimen: Blood from Arm, Left Updated: 10/04/24 1039     hs TnI 0hr 7 ng/L     CBC and differential [[id number]]  (Abnormal) Collected: 10/04/24 1002    Lab Status: Final result Specimen: Blood from Arm, Left Updated: 10/04/24 1009     WBC 9.85 Thousand/uL      RBC 4.14 Million/uL      Hemoglobin 12.4 g/dL      Hematocrit 38.7 %      MCV 94 fL      MCH 30.0 pg      MCHC 32.0 g/dL      RDW 13.2 %      MPV 10.1 fL      Platelets 235 Thousands/uL      nRBC 0 /100 WBCs      Segmented % 34 %      Immature Grans % 0 %      Lymphocytes % 52 %      Monocytes % 7 %      Eosinophils Relative 6 %      Basophils Relative 1 %      Absolute Neutrophils 3.34 Thousands/µL      Absolute Immature Grans 0.02 Thousand/uL      Absolute Lymphocytes 5.11 Thousands/µL      Absolute Monocytes 0.72 Thousand/µL      Eosinophils Absolute 0.59 Thousand/µL      Basophils Absolute 0.07 Thousands/µL             CT head without contrast   Final Interpretation by Oliver Hernandez MD (10/04 1500)      Acute pansinusitis.      The study was marked in EPIC for immediate notification.                  Workstation performed: [id number]         XR chest 1 view portable   Final Interpretation by Kiel Wan MD (10/04 1053)      No radiographic  evidence of acute intrathoracic process.            Workstation performed: [id number]             ECG 12 Lead Documentation Only    Date/Time: 10/4/2024 10:02 AM    Performed by: Florentin Lundy MD  Authorized by: Florentin Lundy MD    Indications / Diagnosis:  Dizziness  ECG reviewed by me, the ED Provider: yes    Patient location:  ED  Interpretation:     Interpretation: normal    Rate:     ECG rate:  50    ECG rate assessment: bradycardic    Rhythm:     Rhythm: sinus bradycardia    Ectopy:     Ectopy: none    QRS:     QRS axis:  Normal    QRS intervals:  Normal  Conduction:     Conduction: normal    ST segments:     ST segments:  Normal  T waves:     T waves: normal        ED Medication and Procedure Management   Prior to Admission Medications   Prescriptions Last Dose Informant Patient Reported? Taking?   Biotin 2500 MCG CAPS  Self Yes No   Sig: Take by mouth daily   Cholecalciferol (VITAMIN D-3 PO)  Self Yes No   Sig: Take by mouth daily   Saccharomyces boulardii (Probiotic) 250 MG CAPS  Self Yes No   Sig: Take by mouth   Synthroid 137 MCG tablet  Self No No   Sig: Take 1 tablet (137 mcg total) by mouth daily   Vascepa 1 g CAPS  Self Yes No   amLODIPine (NORVASC) 5 mg tablet   Yes No   Sig: Take 5 mg by mouth daily   fenofibrate (TRICOR) 145 mg tablet  Self Yes No   Sig: Take 145 mg by mouth daily   hydrochlorothiazide (HYDRODIURIL) 12.5 mg tablet  Self Yes No   Sig: Take 12.5 mg by mouth daily   metFORMIN (GLUCOPHAGE) 500 mg tablet   Yes No   Si mg 2 (two) times a day   metoprolol succinate (TOPROL-XL) 25 mg 24 hr tablet   No No   Sig: TAKE 1 TABLET BY MOUTH DAILY   nitroglycerin (NITROSTAT) 0.4 mg SL tablet  Self Yes No   Sig: if needed   omeprazole (PriLOSEC) 20 mg delayed release capsule   No No   Sig: Take 1 capsule (20 mg total) by mouth daily   simvastatin (ZOCOR) 20 mg tablet  Self No No   Sig: Take 1 tablet (20 mg total) by mouth daily at bedtime   valsartan (DIOVAN) 160 mg tablet   No No   Sig:  Take 2 tablets (320 mg total) by mouth daily      Facility-Administered Medications: None     Patient's Medications   Discharge Prescriptions    MECLIZINE (ANTIVERT) 25 MG TABLET    Take 1 tablet (25 mg total) by mouth 3 (three) times a day as needed for dizziness       Start Date: 10/4/2024 End Date: --       Order Dose: 25 mg       Quantity: 30 tablet    Refills: 0     No discharge procedures on file.  ED SEPSIS DOCUMENTATION   Time reflects when diagnosis was documented in both MDM as applicable and the Disposition within this note       Time User Action Codes Description Comment    10/4/2024  1:17 PM Florentin Lundy Add [R42] Lightheadedness     10/4/2024  3:23 PM Florentin Lundy Add [R42] Dizziness     10/4/2024  3:24 PM Florentin Lundy Add [J32.4] Pansinusitis     10/4/2024  3:24 PM Florentin Lundy Modify [R42] Lightheadedness     10/4/2024  3:24 PM Florentin Lundy Modify [R42] Dizziness                  Florentin Lundy MD  10/04/24 1528

## 2024-10-04 NOTE — ASSESSMENT & PLAN NOTE
"CT imaging with radiologist read of \"acute pansinusitis\"  Patient currently without sinus symptoms - remains afebrile without leukocytosis  Given a dose of IV Unasyn in the ED -> observe off further antibiotics for now, as even if true infection, more likely viral in nature  Procalcitonin pending  "

## 2024-10-04 NOTE — ASSESSMENT & PLAN NOTE
"Positional in presentation/description  Orthostatics negative - CT of head negative for pathologic etiology, however, read as \"acute pansinusitis\" (see plan below)  History of CAD, hypertension and diabetes noted -> not unreasonable to pursue MRI to rule out possibility of \"stroke\"  Limit/avoid sedating agents as possible  Await neurology input  "

## 2024-10-04 NOTE — H&P
"H&P - Hospitalist   Name: Adelina Dyer 83 y.o. female I MRN: [id number]  Unit/Bed#: [address] [place] [address] Date of Admission: 10/4/2024   Date of Service: 10/4/2024 I Hospital Day: 0     Assessment & Plan  Dizziness  Positional in presentation/description  Orthostatics negative - CT of head negative for pathologic etiology, however, read as \"acute pansinusitis\" (see plan below)  History of CAD, hypertension and diabetes noted -> not unreasonable to pursue MRI to rule out possibility of \"stroke\"  Limit/avoid sedating agents as possible  Await neurology input  Chronic kidney disease, stage 3 (HCC)  Baseline creatinine of approximately 1.0-1.1 -> currently stable at 1.1  Monitor renal function and urine output  Limit/avoid hypotension and nephrotoxins as possible - note chronic ARB (Cozaar) use   Abnormal CT scan  CT imaging with radiologist read of \"acute pansinusitis\"  Patient currently without sinus symptoms - remains afebrile without leukocytosis  Given a dose of IV Unasyn in the ED -> observe off further antibiotics for now, as even if true infection, more likely viral in nature  Procalcitonin pending  Hypothyroidism  Continue Synthroid  In light of presenting issue, check updated thyroid panel  Type 2 diabetes mellitus (HCC)  Lab Results   Component Value Date    HGBA1C 6.6 (H) 05/17/2024     Hold oral hypoglycemics while hospitalized  Initiate SSI coverage fractures  Carbohydrate restriction  Hypoglycemia protocol  Essential hypertension  Continue HCTZ/Toprol-XL/Cozaar  CAD (coronary artery disease)  Status post prior angioplasty  Continue Toprol-XL/Cozaar/statin      VTE Pharmacologic Prophylaxis: VTE Score: 4 Moderate Risk (Score 3-4) - Pharmacological DVT Prophylaxis Ordered: Heparin.    Code Status: Level 1 - Full Code    Discussion with:  Patient at bedside, along with daughter, outside of room    Anticipated Length of Stay:  Patient will be admitted on an Observation basis with an anticipated length of stay " of less than 2 midnights.  Justification for Hospital Stay: Positional dizziness requiring neurologic evaluation and MRI of brain.    Total Time for Visit (including Counseling & Coordination of Care):  75 minutes. More than 50% of total time spent on counseling and coordination of care, on one or more of the following: performing physical exam; counseling and coordination of care; obtaining or reviewing history; documenting in the medical record; reviewing/ordering tests, medications or procedures; communicating with other healthcare professionals and discussing with patient's family/caregivers.      History of Present Illness    Chief Complaint: Dizziness    Adelina Dyer is a 83 y.o. female who presents with recurrent episodes of positional dizziness that initially started last night, but transiently improved, allowing patient go to sleep.  Unfortunately, after waking up this morning, she started to have recurrent symptoms, exacerbated by positional movements from laying to sitting to standing, etc.  This is led to fear and anxiety in regards to ambulation due to gait instability.  Denies chest pain or shortness of breath, however, did recall mild nausea with vomiting.  Denies any prior such episodes.  No other acute complaints at this time.    Review of Systems:  A thorough 12 point review systems was conducted.  Pertinent positives and negatives are mentioned in the history of present illness.      Past Medical & Surgical History    Past Medical History:   Diagnosis Date    Coronary artery disease     Hyperlipidemia     Hypertension     Hypothyroidism     MI, old     UTI (urinary tract infection) 07/26/2022       Past Surgical History:   Procedure Laterality Date    CHOLECYSTECTOMY      CORONARY ANGIOPLASTY      2001     AR XCAPSL CTRC RMVL INSJ IO LENS PROSTH W/O ECP Right 8/1/2022    Procedure: EXTRACTION EXTRACAPSULAR CATARACT PHACO INTRAOCULAR LENS (IOL);  Surgeon: Finn Heredia MD;  Location: [facility]  MAIN OR;  Service: Ophthalmology    CT XCAPSL CTRC RMVL INSJ IO LENS PROSTH W/O ECP Left 10/17/2022    Procedure: EXTRACTION EXTRACAPSULAR CATARACT PHACO INTRAOCULAR LENS (IOL);  Surgeon: Finn Heredia MD;  Location: [facility] MAIN OR;  Service: Ophthalmology    TONSILLECTOMY           Medications:   Prior to Admission medications    Medication Sig Start Date End Date Taking? Authorizing Provider   meclizine (ANTIVERT) 25 mg tablet Take 1 tablet (25 mg total) by mouth 3 (three) times a day as needed for dizziness 10/4/24  Yes Florentin Lundy MD   metoprolol succinate (TOPROL-XL) 25 mg 24 hr tablet TAKE 1 TABLET BY MOUTH DAILY 8/6/24  Yes Destinee Wong MD   Synthroid 137 MCG tablet Take 1 tablet (137 mcg total) by mouth daily 2/26/24  Yes Destinee Wong MD   amLODIPine (NORVASC) 5 mg tablet Take 5 mg by mouth daily 5/23/24   Historical Provider, MD   Biotin 2500 MCG CAPS Take by mouth daily    Historical Provider, MD   Cholecalciferol (VITAMIN D-3 PO) Take by mouth daily    Historical Provider, MD   fenofibrate (TRICOR) 145 mg tablet Take 145 mg by mouth daily 12/21/23   Historical Provider, MD   hydrochlorothiazide (HYDRODIURIL) 12.5 mg tablet Take 12.5 mg by mouth daily    Historical Provider, MD   metFORMIN (GLUCOPHAGE) 500 mg tablet 500 mg 2 (two) times a day 4/17/24   Historical Provider, MD   nitroglycerin (NITROSTAT) 0.4 mg SL tablet if needed 9/29/22   Historical Provider, MD   omeprazole (PriLOSEC) 20 mg delayed release capsule Take 1 capsule (20 mg total) by mouth daily 6/14/24   Matthew Nathan MD   Saccharomyces boulardii (Probiotic) 250 MG CAPS Take by mouth    Historical Provider, MD   simvastatin (ZOCOR) 20 mg tablet Take 1 tablet (20 mg total) by mouth daily at bedtime 12/11/23   Destinee Wong MD   valsartan (DIOVAN) 160 mg tablet Take 2 tablets (320 mg total) by mouth daily 4/2/24   Amina Anderson MD   Vascepa 1 g CAPS  7/28/22   Historical Provider, MD       Allergies:   Allergies   Allergen  "Reactions    Iodinated Contrast Media Hives         Social & Family History    Social History     Substance and Sexual Activity   Alcohol Use Not Currently     Social History     Tobacco Use   Smoking Status Never    Passive exposure: Past   Smokeless Tobacco Never     Social History     Substance and Sexual Activity   Drug Use No       Family History   Problem Relation Age of Onset    Thyroid disease Mother     Lung cancer Mother     Cirrhosis Father     Heart attack Sister     No Known Problems Sister     No Known Problems Sister     No Known Problems Daughter     No Known Problems Daughter     No Known Problems Daughter     No Known Problems Maternal Aunt     No Known Problems Maternal Aunt     No Known Problems Maternal Aunt     No Known Problems Maternal Aunt     No Known Problems Maternal Aunt          Objective     Vitals:   Blood Pressure: 136/67 (10/04/24 1732)  Pulse: 66 (10/04/24 1732)  Temperature: 97.8 °F (36.6 °C) (10/04/24 1651)  Respirations: 18 (10/04/24 1651)  Height: 5' 1.5\" (156.2 cm) (10/04/24 1732)  Weight - Scale: 78 kg (172 lb) (10/04/24 1732)  SpO2: 92 % (10/04/24 1732)      Physical Exam:    GENERAL Mildly weak/fatigued   HEAD   Normocephalic - atraumatic   EYES Nonicteric   MOUTH   Mucosa moist   NECK   Supple - full range of motion   CARDIAC Rate controlled   PULMONARY Fairly clear to auscultation   ABDOMEN Nontender/nondistended   MUSCULOSKELETAL   Motor strength/range of motion intact   NEUROLOGIC   Alert/oriented at baseline - no obvious nystagmus - no obvious current focal deficits   SKIN   Chronic wrinkles/blemishes    PSYCHIATRIC   Mood/affect stable         Labs & Recent Cultures:  Results from last 7 days   Lab Units 10/04/24  1002   WBC Thousand/uL 9.85   HEMOGLOBIN g/dL 12.4   HEMATOCRIT % 38.7   PLATELETS Thousands/uL 235   SEGS PCT % 34*   LYMPHO PCT % 52*   MONO PCT % 7   EOS PCT % 6     Results from last 7 days   Lab Units 10/04/24  1002   SODIUM mmol/L 135   POTASSIUM " mmol/L 3.8   CHLORIDE mmol/L 101   CO2 mmol/L 27   BUN mg/dL 18   CREATININE mg/dL 1.10   ANION GAP mmol/L 7   CALCIUM mg/dL 9.7   ALBUMIN g/dL 4.1   TOTAL BILIRUBIN mg/dL 0.51   ALK PHOS U/L 39   ALT U/L 13   AST U/L 18   GLUCOSE RANDOM mg/dL 144*         Results from last 7 days   Lab Units 10/04/24  1714   POC GLUCOSE mg/dl 106                         Lines/Drains:  Invasive Devices       Peripheral Intravenous Line  Duration             Peripheral IV 10/04/24 Distal;Dorsal (posterior);Left Forearm <1 day    Peripheral IV 10/04/24 Left Antecubital <1 day                      Imaging:     CT head without contrast    Result Date: 10/4/2024  Narrative: CT BRAIN - WITHOUT CONTRAST INDICATION:   Dizziness. COMPARISON:  None. TECHNIQUE:  CT examination of the brain was performed.  Multiplanar 2D reformatted images were created from the source data. Radiation dose length product (DLP) for this visit:  926.95 mGy-cm .  This examination, like all CT scans performed in the [facility], was performed utilizing techniques to minimize radiation dose exposure, including the use of iterative  reconstruction and automated exposure control. IMAGE QUALITY:  Diagnostic. FINDINGS: PARENCHYMA:  No intracranial mass, mass effect or midline shift. No CT signs of acute infarction.  No acute parenchymal hemorrhage. VENTRICLES AND EXTRA-AXIAL SPACES:  Normal for the patient's age. VISUALIZED ORBITS: Normal visualized orbits. PARANASAL SINUSES: Diffuse mucosal disease most prominent in the ethmoid sinuses with layering fluid in the maxillary and sphenoid sinuses in keeping with acute sinusitis. CALVARIUM AND EXTRACRANIAL SOFT TISSUES:  Normal.     Impression: Acute pansinusitis. The study was marked in EPIC for immediate notification. Workstation performed: [id number]         XR chest 1 view portable    Result Date: 10/4/2024  Narrative: XR CHEST PORTABLE INDICATION: dizziness. COMPARISON: Two-view chest 6/17/2024  FINDINGS: No airspace consolidation, pneumothorax, pulmonary edema, or pleural effusion.  . Normal cardiac silhouette.  Aortic calcification is present. Multilevel thoracic spondylosis. Normal upper abdomen.     Impression: No radiographic evidence of acute intrathoracic process. Workstation performed: [id number]                     YANDY AGUIAR MD   Hospitalist - [facility] Internal Medicine        ** Please Note:  Documentation is constructed using a voice recognition dictation system.  An occasional wrong word/phrase or “sound-a-like” substitution may have been picked up by dictation device due to the inherent limitations of voice recognition software.  Read the chart carefully and recognize, using reasonable context, where substitutions may have occurred.**

## 2024-10-04 NOTE — ASSESSMENT & PLAN NOTE
Baseline creatinine of approximately 1.0-1.1 -> currently stable at 1.1  Monitor renal function and urine output  Limit/avoid hypotension and nephrotoxins as possible - note chronic ARB (Cozaar) use

## 2024-10-04 NOTE — PLAN OF CARE
Problem: SAFETY ADULT  Goal: Patient will remain free of falls  Description: INTERVENTIONS:  - Educate patient/family on patient safety including physical limitations  - Instruct patient to call for assistance with activity   - Consult OT/PT to assist with strengthening/mobility   - Keep Call bell within reach    Outcome: Progressing     Problem: DISCHARGE PLANNING  Goal: Discharge to home or other facility with appropriate resources  Description: INTERVENTIONS:  - Identify barriers to discharge w/patient and caregiver  - Arrange for needed discharge resources and transportation as appropriate  - Identify discharge learning needs (meds, wound care, etc.)  - Arrange for interpretive services to assist at discharge as needed  - Refer to Case Management Department for coordinating discharge planning if the patient needs post-hospital services based on physician/advanced practitioner order or complex needs related to functional status, cognitive ability, or social support system  Outcome: Progressing     Problem: Knowledge Deficit  Goal: Patient/family/caregiver demonstrates understanding of disease process, treatment plan, medications, and discharge instructions  Description: Complete learning assessment and assess knowledge base.  Interventions:  - Provide teaching at level of understanding  - Provide teaching via preferred learning methods  Outcome: Progressing     Problem: NEUROSENSORY - ADULT  Goal: Achieves stable or improved neurological status  Description: INTERVENTIONS  - Monitor and report changes in neurological status  - Monitor vital signs such as temperature, blood pressure, glucose, and any other labs ordered   - Initiate measures to prevent increased intracranial pressure  - Monitor for seizure activity and implement precautions     Outcome: Progressing     Problem: CARDIOVASCULAR - ADULT  Goal: Maintains optimal cardiac output and hemodynamic stability  Description: INTERVENTIONS:  - Monitor I/O,  vital signs and rhythm  - Monitor for S/S and trends of decreased cardiac output  - Administer and titrate ordered vasoactive medications to optimize hemodynamic stability  - Assess quality of pulses, skin color and temperature  - Assess for signs of decreased coronary artery perfusion  - Instruct patient to report change in severity of symptoms  Outcome: Progressing     Problem: METABOLIC, FLUID AND ELECTROLYTES - ADULT  Goal: Electrolytes maintained within normal limits  Description: INTERVENTIONS:  - Monitor labs and assess patient for signs and symptoms of electrolyte imbalances  - Administer electrolyte replacement as ordered  - Monitor response to electrolyte replacements, including repeat lab results as appropriate  - Instruct patient on fluid and nutrition as appropriate  Outcome: Progressing

## 2024-10-04 NOTE — ASSESSMENT & PLAN NOTE
Lab Results   Component Value Date    HGBA1C 6.6 (H) 05/17/2024     Hold oral hypoglycemics while hospitalized  Initiate SSI coverage fractures  Carbohydrate restriction  Hypoglycemia protocol

## 2024-10-05 ENCOUNTER — APPOINTMENT (OUTPATIENT)
Dept: RADIOLOGY | Facility: HOSPITAL | Age: 84
DRG: 149 | End: 2024-10-05
Payer: MEDICARE

## 2024-10-05 LAB
ANION GAP SERPL CALCULATED.3IONS-SCNC: 6 MMOL/L (ref 4–13)
BASOPHILS # BLD AUTO: 0.05 THOUSANDS/ΜL (ref 0–0.1)
BASOPHILS NFR BLD AUTO: 1 % (ref 0–1)
BUN SERPL-MCNC: 13 MG/DL (ref 5–25)
CALCIUM SERPL-MCNC: 8.7 MG/DL (ref 8.4–10.2)
CHLORIDE SERPL-SCNC: 107 MMOL/L (ref 96–108)
CO2 SERPL-SCNC: 27 MMOL/L (ref 21–32)
CREAT SERPL-MCNC: 1.08 MG/DL (ref 0.6–1.3)
EOSINOPHIL # BLD AUTO: 0.26 THOUSAND/ΜL (ref 0–0.61)
EOSINOPHIL NFR BLD AUTO: 3 % (ref 0–6)
ERYTHROCYTE [DISTWIDTH] IN BLOOD BY AUTOMATED COUNT: 13.2 % (ref 11.6–15.1)
GFR SERPL CREATININE-BSD FRML MDRD: 46 ML/MIN/1.73SQ M
GLUCOSE SERPL-MCNC: 103 MG/DL (ref 65–140)
GLUCOSE SERPL-MCNC: 103 MG/DL (ref 65–140)
GLUCOSE SERPL-MCNC: 124 MG/DL (ref 65–140)
GLUCOSE SERPL-MCNC: 88 MG/DL (ref 65–140)
GLUCOSE SERPL-MCNC: 90 MG/DL (ref 65–140)
HCT VFR BLD AUTO: 35.3 % (ref 34.8–46.1)
HGB BLD-MCNC: 11.1 G/DL (ref 11.5–15.4)
IMM GRANULOCYTES # BLD AUTO: 0.04 THOUSAND/UL (ref 0–0.2)
IMM GRANULOCYTES NFR BLD AUTO: 0 % (ref 0–2)
LYMPHOCYTES # BLD AUTO: 4.22 THOUSANDS/ΜL (ref 0.6–4.47)
LYMPHOCYTES NFR BLD AUTO: 42 % (ref 14–44)
MCH RBC QN AUTO: 29.8 PG (ref 26.8–34.3)
MCHC RBC AUTO-ENTMCNC: 31.4 G/DL (ref 31.4–37.4)
MCV RBC AUTO: 95 FL (ref 82–98)
MONOCYTES # BLD AUTO: 0.83 THOUSAND/ΜL (ref 0.17–1.22)
MONOCYTES NFR BLD AUTO: 8 % (ref 4–12)
NEUTROPHILS # BLD AUTO: 4.76 THOUSANDS/ΜL (ref 1.85–7.62)
NEUTS SEG NFR BLD AUTO: 46 % (ref 43–75)
NRBC BLD AUTO-RTO: 0 /100 WBCS
PLATELET # BLD AUTO: 209 THOUSANDS/UL (ref 149–390)
PMV BLD AUTO: 10.2 FL (ref 8.9–12.7)
POTASSIUM SERPL-SCNC: 3.7 MMOL/L (ref 3.5–5.3)
PROCALCITONIN SERPL-MCNC: <0.05 NG/ML
RBC # BLD AUTO: 3.72 MILLION/UL (ref 3.81–5.12)
SODIUM SERPL-SCNC: 140 MMOL/L (ref 135–147)
WBC # BLD AUTO: 10.16 THOUSAND/UL (ref 4.31–10.16)

## 2024-10-05 PROCEDURE — 80048 BASIC METABOLIC PNL TOTAL CA: CPT | Performed by: PHYSICIAN ASSISTANT

## 2024-10-05 PROCEDURE — 99232 SBSQ HOSP IP/OBS MODERATE 35: CPT | Performed by: INTERNAL MEDICINE

## 2024-10-05 PROCEDURE — 97163 PT EVAL HIGH COMPLEX 45 MIN: CPT

## 2024-10-05 PROCEDURE — 85025 COMPLETE CBC W/AUTO DIFF WBC: CPT | Performed by: PHYSICIAN ASSISTANT

## 2024-10-05 PROCEDURE — 84145 PROCALCITONIN (PCT): CPT | Performed by: INTERNAL MEDICINE

## 2024-10-05 PROCEDURE — 82948 REAGENT STRIP/BLOOD GLUCOSE: CPT

## 2024-10-05 PROCEDURE — 70551 MRI BRAIN STEM W/O DYE: CPT

## 2024-10-05 RX ORDER — CHLORAL HYDRATE 500 MG
1000 CAPSULE ORAL 2 TIMES DAILY
Status: DISCONTINUED | OUTPATIENT
Start: 2024-10-05 | End: 2024-10-07 | Stop reason: HOSPADM

## 2024-10-05 RX ORDER — LEVOTHYROXINE SODIUM 125 UG/1
125 TABLET ORAL
Status: DISCONTINUED | OUTPATIENT
Start: 2024-10-06 | End: 2024-10-07 | Stop reason: HOSPADM

## 2024-10-05 RX ORDER — MECLIZINE HYDROCHLORIDE 25 MG/1
25 TABLET ORAL EVERY 8 HOURS SCHEDULED
Status: COMPLETED | OUTPATIENT
Start: 2024-10-05 | End: 2024-10-07

## 2024-10-05 RX ADMIN — ONDANSETRON 4 MG: 2 INJECTION INTRAMUSCULAR; INTRAVENOUS at 16:41

## 2024-10-05 RX ADMIN — LEVOTHYROXINE SODIUM 137 MCG: 25 TABLET ORAL at 05:11

## 2024-10-05 RX ADMIN — AMLODIPINE BESYLATE 5 MG: 5 TABLET ORAL at 09:59

## 2024-10-05 RX ADMIN — MECLIZINE HYDROCHLORIDE 25 MG: 25 TABLET ORAL at 21:53

## 2024-10-05 RX ADMIN — METOPROLOL SUCCINATE 25 MG: 25 TABLET, EXTENDED RELEASE ORAL at 09:59

## 2024-10-05 RX ADMIN — PRAVASTATIN SODIUM 40 MG: 40 TABLET ORAL at 16:41

## 2024-10-05 RX ADMIN — OMEGA-3 FATTY ACIDS CAP 1000 MG 1000 MG: 1000 CAP at 12:02

## 2024-10-05 RX ADMIN — LOSARTAN POTASSIUM 100 MG: 50 TABLET, FILM COATED ORAL at 09:59

## 2024-10-05 RX ADMIN — Medication 250 MG: at 17:12

## 2024-10-05 RX ADMIN — PANTOPRAZOLE SODIUM 40 MG: 40 TABLET, DELAYED RELEASE ORAL at 05:11

## 2024-10-05 RX ADMIN — ACETAMINOPHEN 1000 MG: 10 INJECTION INTRAVENOUS at 23:23

## 2024-10-05 RX ADMIN — FENOFIBRATE 145 MG: 145 TABLET, FILM COATED ORAL at 09:59

## 2024-10-05 RX ADMIN — Medication 250 MG: at 09:59

## 2024-10-05 RX ADMIN — HYDROCHLOROTHIAZIDE 12.5 MG: 12.5 TABLET ORAL at 09:59

## 2024-10-05 RX ADMIN — OMEGA-3 FATTY ACIDS CAP 1000 MG 1000 MG: 1000 CAP at 17:12

## 2024-10-05 RX ADMIN — MECLIZINE HYDROCHLORIDE 25 MG: 25 TABLET ORAL at 16:41

## 2024-10-05 RX ADMIN — ACETAMINOPHEN 1000 MG: 10 INJECTION INTRAVENOUS at 16:43

## 2024-10-05 NOTE — ASSESSMENT & PLAN NOTE
Baseline creatinine of approximately 1.0-1.1 -> currently stable at 1.08  Monitor renal function and urine output  Limit/avoid hypotension and nephrotoxins as possible - note chronic ARB (Cozaar) use

## 2024-10-05 NOTE — ASSESSMENT & PLAN NOTE
Continue Synthroid  Thyroid panel noting a decreased TSH and elevated free T4 -> in light of presenting symptoms, decrease Synthroid to 125 mcg and repeat thyroid panel in 6-8 weeks -> outpatient follow-up

## 2024-10-05 NOTE — PLAN OF CARE
Problem: PHYSICAL THERAPY ADULT  Goal: Performs mobility at highest level of function for planned discharge setting.  See evaluation for individualized goals.  Description: Treatment/Interventions: LE strengthening/ROM, Functional transfer training, ADL retraining, Gait training, Bed mobility, Equipment eval/education, Patient/family training, Therapeutic exercise, Elevations          See flowsheet documentation for full assessment, interventions and recommendations.  Note: Prognosis: Good  Problem List: Impaired balance, Decreased mobility  Assessment: Pt is 83 y.o. female seen for PT evaluation s/p admit to [facility] on 10/4/2024 w/ Dizziness. PT consulted to assess pt's functional mobility and d/c needs. Order placed for PT eval and tx, w/ up and OOB as tolerated order.  Co-morbidities affecting patient's physical performance include: htn, CAD, DM.  Personal factors affecting patient at time of initial evaluation include: stairs to enter home, inability to ambulate household distances, limited home support, inability to perform ADLS, and inability to live alone.     Prior to admission, patient was independent with functional mobility without assistive device and independent with ADLS.  Upon evaluation: Pt was able to stand with min assist but not able to walk due to dizziness.      Please find objective findings from Physical Therapy assessment regarding body systems outlined above with impairments and limitations including impaired balance, gait deviations, decreased activity tolerance, decreased functional mobility tolerance, and fall risk.The Barthel Index was used as a functional outcome tool presenting with a score of Barthel Index Score: 50 today indicating marked limitations of functional mobility and ADLS.  Patient's clinical presentation is currently unstable/unpredictable as seen in patient's presentation of increased fall risk and new onset of impairment of functional mobility. Pt would  benefit from continued Physical Therapy treatment to address deficits as defined above and maximize level of functional mobility.     As demonstrated by objective findings, the assigned level of complexity for this evaluation is high.The patient's AM-PAC Basic Mobility Inpatient Short Form Raw Score is 17. A Raw score of greater than 16 suggests the patient may benefit from discharge to home, however as pt lives alone and now is unable to ambulate due to dizziness, pt may require additional resources from post acute rehab. Please also refer to the recommendation of the Physical Therapist for safe discharge planning.        Rehab Resource Intensity Level, PT: II (Moderate Resource Intensity)    See flowsheet documentation for full assessment.

## 2024-10-05 NOTE — ASSESSMENT & PLAN NOTE
"Positional in presentation/description  Orthostatics negative - CT of head negative for pathologic etiology, however, read as \"acute pansinusitis\" (see plan below)  History of CAD, hypertension and diabetes noted -> not unreasonable to pursue MRI to rule out possibility of \"stroke\"  Limit/avoid sedating agents as possible  If MRI negative for stroke, strong likelihood of positional vertigo, at which point we will continue working with PT/OT and training for Epley maneuvers, etc. - can trial PRN Meclizine  "

## 2024-10-05 NOTE — ASSESSMENT & PLAN NOTE
Lab Results   Component Value Date    HGBA1C 6.2 (H) 10/04/2024     Holding oral hypoglycemics while hospitalized  Initiate SSI coverage fractures  Carbohydrate restriction  Hypoglycemia protocol

## 2024-10-05 NOTE — PHYSICAL THERAPY NOTE
"   PT EVALUATION     10/05/24 3295   PT Last Visit   PT Visit Date 10/05/24   Note Type   Note type Evaluation   Pain Assessment   Pain Assessment Tool Song-Baker FACES   Song-Baker FACES Pain Rating 4   Pain Location/Orientation Location: Neck   Restrictions/Precautions   Other Precautions Chair Alarm;Bed Alarm;Fall Risk;Pain   Home Living   Type of Home House   Home Layout One level  (2 MAGALIE from garage.)   Home Equipment Walker;Cane   Prior Function   Level of [place] Independent with ADLs;Independent with functional mobility   Lives With Alone   Falls in the last 6 months 0   Vocational Retired   Comments Pt bowls 2x per week.   General   Additional Pertinent History Pt admitted with acute onset of dizziness that is worse with movement and better with being still and eyes closed.  MRI brain and neuro consult  pending. Orthostatic BPs negative per nursing notes in EPIC.   Family/Caregiver Present No   Cognition   Overall Cognitive Status WFL   Arousal/Participation Alert   Orientation Level Oriented X4   Subjective   Subjective \"I feel a little better, they are giving me medicine\"   RLE Assessment   RLE Assessment   (ROM WFL, MMT 4+/5)   LLE Assessment   LLE Assessment   (ROM WFL, MMT 4+/5)   Vestibular   Vestibular Comments Pt prefers to keep her eyes closed as it makes her feel less dizzy.  Horizontal nstagmus noted with lateral gaze but difficult to note direction as noted above pt keeps closing her eyes. Other vestibular testing deferred to next session as pt feels too dizzy to do any additional activity.   Coordination   Movements are Fluid and Coordinated 1   Bed Mobility   Supine to Sit 4  Minimal assistance   Sit to Supine 4  Minimal assistance   Transfers   Sit to Stand 4  Minimal assistance   Stand to Sit 4  Minimal assistance   Ambulation/Elevation   Gait Assistance 4  Minimal assist   Assistive Device   (hand held)   Distance few steps to the head of the bed   Balance   Static Sitting Fair "   Static Standing Fair -   Activity Tolerance   Activity Tolerance Other (Comment)  (limited by dizziness)   Assessment   Prognosis Good   Problem List Impaired balance;Decreased mobility   Assessment Pt is 83 y.o. female seen for PT evaluation s/p admit to [facility] on 10/4/2024 w/ Dizziness. PT consulted to assess pt's functional mobility and d/c needs. Order placed for PT eval and tx, w/ up and OOB as tolerated order.  Co-morbidities affecting patient's physical performance include: htn, CAD, DM.  Personal factors affecting patient at time of initial evaluation include: stairs to enter home, inability to ambulate household distances, limited home support, inability to perform ADLS, and inability to live alone.         Prior to admission, patient was independent with functional mobility without assistive device and independent with ADLS.  Upon evaluation: Pt was able to stand with min assist but not able to walk due to dizziness.          Please find objective findings from Physical Therapy assessment regarding body systems outlined above with impairments and limitations including impaired balance, gait deviations, decreased activity tolerance, decreased functional mobility tolerance, and fall risk.The Barthel Index was used as a functional outcome tool presenting with a score of Barthel Index Score: 50 today indicating marked limitations of functional mobility and ADLS.  Patient's clinical presentation is currently unstable/unpredictable as seen in patient's presentation of increased fall risk and new onset of impairment of functional mobility. Pt would benefit from continued Physical Therapy treatment to address deficits as defined above and maximize level of functional mobility. As demonstrated by objective findings, the assigned level of complexity for this evaluation is high.    The patient's AM-[facility] Basic Mobility Inpatient Short Form Raw Score is 17. A Raw score of greater than 16 suggests the  "patient may benefit from discharge to home, however as pt lives alone and now is unable to ambulate due to dizziness, pt may require additional resources from post acute rehab. Please also refer to the recommendation of the Physical Therapist for safe discharge planning.    Plan to compete vestibular testing next session if symptoms less acute.    Goals   Patient Goals \"no dizziness\"   STG Expiration Date 10/12/24   Short Term Goal #1 1. Complete vestibular assessment including DGI, 2. Pt will sit up OOB in a chair x4 hours, 3.Pt will ambulate with min assist x 40 feet   LTG Expiration Date 10/19/24   Long Term Goal #1 1. independent bed mobility, 2. independent transfers, 3.independent ambulation with least restrictive device 100 feet, 4. independent up and down 2 steps so pt can enter and exit her home, 5. improve DGI to score to 24/24   Plan   Treatment/Interventions LE strengthening/ROM;Functional transfer training;ADL retraining;Gait training;Bed mobility;Equipment eval/education;Patient/family training;Therapeutic exercise;Elevations   PT Frequency Other (Comment)  (5x/week)   Discharge Recommendation   Rehab Resource Intensity Level, PT II (Moderate Resource Intensity)   AM-PAC Basic Mobility Inpatient   Turning in Flat Bed Without Bedrails 4   Lying on Back to Sitting on Edge of Flat Bed Without Bedrails 3   Moving Bed to Chair 3   Standing Up From Chair Using Arms 3   Walk in Room 2   Climb 3-5 Stairs With Railing 2   Basic Mobility Inpatient Raw Score 17   Basic Mobility Standardized Score 39.67   [facility] Highest Level Of Mobility   -[facility] Goal 5: Stand one or more mins   [facility] Achieved 5: Stand (1 or more minutes)   Modified [place] Scale   Modified [place] Scale 4   Barthel Index   Feeding 10   Bathing 0   Grooming Score 0   Dressing Score 5   Bladder Score 10   Bowels Score 10   Toilet Use Score 5   Transfers (Bed/Chair) Score 10   Mobility (Level Surface) Score 0   Stairs Score 0   Barthel Index " Score 50   End of Consult   Patient Position at End of Consult All needs within reach;Supine   Licensure   [place] License Number  Pamela Rubin PT [id number]

## 2024-10-06 LAB
ATRIAL RATE: 50 BPM
GLUCOSE SERPL-MCNC: 111 MG/DL (ref 65–140)
GLUCOSE SERPL-MCNC: 112 MG/DL (ref 65–140)
GLUCOSE SERPL-MCNC: 148 MG/DL (ref 65–140)
GLUCOSE SERPL-MCNC: 93 MG/DL (ref 65–140)
P AXIS: 35 DEGREES
PR INTERVAL: 226 MS
QRS AXIS: -7 DEGREES
QRSD INTERVAL: 84 MS
QT INTERVAL: 444 MS
QTC INTERVAL: 404 MS
T WAVE AXIS: 47 DEGREES
VENTRICULAR RATE: 50 BPM

## 2024-10-06 PROCEDURE — 82948 REAGENT STRIP/BLOOD GLUCOSE: CPT

## 2024-10-06 PROCEDURE — 97167 OT EVAL HIGH COMPLEX 60 MIN: CPT

## 2024-10-06 PROCEDURE — 99232 SBSQ HOSP IP/OBS MODERATE 35: CPT | Performed by: INTERNAL MEDICINE

## 2024-10-06 PROCEDURE — 93010 ELECTROCARDIOGRAM REPORT: CPT | Performed by: INTERNAL MEDICINE

## 2024-10-06 RX ORDER — MECLIZINE HYDROCHLORIDE 25 MG/1
25 TABLET ORAL EVERY 8 HOURS PRN
Status: DISCONTINUED | OUTPATIENT
Start: 2024-10-07 | End: 2024-10-06

## 2024-10-06 RX ORDER — FLUTICASONE PROPIONATE 50 MCG
1 SPRAY, SUSPENSION (ML) NASAL 2 TIMES DAILY
Status: DISCONTINUED | OUTPATIENT
Start: 2024-10-06 | End: 2024-10-07 | Stop reason: HOSPADM

## 2024-10-06 RX ORDER — MECLIZINE HYDROCHLORIDE 25 MG/1
25 TABLET ORAL EVERY 8 HOURS PRN
Status: DISCONTINUED | OUTPATIENT
Start: 2024-10-07 | End: 2024-10-07 | Stop reason: HOSPADM

## 2024-10-06 RX ADMIN — MECLIZINE HYDROCHLORIDE 25 MG: 25 TABLET ORAL at 21:14

## 2024-10-06 RX ADMIN — METOPROLOL SUCCINATE 25 MG: 25 TABLET, EXTENDED RELEASE ORAL at 08:59

## 2024-10-06 RX ADMIN — FLUTICASONE PROPIONATE 1 SPRAY: 50 SPRAY, METERED NASAL at 18:33

## 2024-10-06 RX ADMIN — PANTOPRAZOLE SODIUM 40 MG: 40 TABLET, DELAYED RELEASE ORAL at 05:59

## 2024-10-06 RX ADMIN — LEVOTHYROXINE SODIUM 125 MCG: 125 TABLET ORAL at 05:59

## 2024-10-06 RX ADMIN — MECLIZINE HYDROCHLORIDE 25 MG: 25 TABLET ORAL at 14:55

## 2024-10-06 RX ADMIN — LOSARTAN POTASSIUM 100 MG: 50 TABLET, FILM COATED ORAL at 08:59

## 2024-10-06 RX ADMIN — PRAVASTATIN SODIUM 40 MG: 40 TABLET ORAL at 16:30

## 2024-10-06 RX ADMIN — OMEGA-3 FATTY ACIDS CAP 1000 MG 1000 MG: 1000 CAP at 08:59

## 2024-10-06 RX ADMIN — Medication 250 MG: at 17:05

## 2024-10-06 RX ADMIN — AMLODIPINE BESYLATE 5 MG: 5 TABLET ORAL at 08:59

## 2024-10-06 RX ADMIN — ACETAMINOPHEN 1000 MG: 10 INJECTION INTRAVENOUS at 16:30

## 2024-10-06 RX ADMIN — Medication 250 MG: at 08:59

## 2024-10-06 RX ADMIN — OMEGA-3 FATTY ACIDS CAP 1000 MG 1000 MG: 1000 CAP at 17:04

## 2024-10-06 RX ADMIN — FENOFIBRATE 145 MG: 145 TABLET, FILM COATED ORAL at 08:59

## 2024-10-06 RX ADMIN — HYDROCHLOROTHIAZIDE 12.5 MG: 12.5 TABLET ORAL at 09:00

## 2024-10-06 RX ADMIN — MECLIZINE HYDROCHLORIDE 25 MG: 25 TABLET ORAL at 05:59

## 2024-10-06 NOTE — ASSESSMENT & PLAN NOTE
"CT imaging with radiologist read of \"acute pansinusitis\"  Patient currently without sinus symptoms - remains afebrile without leukocytosis  Given a dose of IV Unasyn in the ED -> observe off further antibiotics for now, as even if true infection, more likely viral in nature  Procalcitonin normal   "

## 2024-10-06 NOTE — PROGRESS NOTES
"Progress Note - Hospitalist   Name: Adelina Dyer 83 y.o. female I MRN: [id number]  Unit/Bed#: [address] [place] [address] Date of Admission: 10/4/2024   Date of Service: 10/6/2024 I Hospital Day: 0    Assessment & Plan  Dizziness  Positional in presentation/description  Orthostatics negative - CT of head negative for pathologic etiology, however, read as \"acute pansinusitis\" (see plan below)  History of CAD, hypertension and diabetes noted -> not unreasonable to pursue MRI to rule out possibility of \"stroke\" -> MRI fortunately negative for acute stroke/infarct  Limit/avoid sedating agents as possible  Symptomatically improved on a trial of Meclizine - continue working with PT/OT and training for Epley maneuvers, etc.   Hopeful discharge tomorrow   Chronic kidney disease, stage 3 (HCC)  Baseline creatinine of approximately 1.0-1.1 -> stable at 1.08 yesterday  Monitor renal function and urine output  Limit/avoid hypotension and nephrotoxins as possible - note chronic ARB (Cozaar) use   Abnormal CT scan  CT imaging with radiologist read of \"acute pansinusitis\"  Patient currently without sinus symptoms - remains afebrile without leukocytosis  Given a dose of IV Unasyn in the ED -> observe off further antibiotics for now, as even if true infection, more likely viral in nature  Procalcitonin normal   Hypothyroidism  Continue Synthroid  Thyroid panel noting a decreased TSH and elevated free T4 -> in light of presenting symptoms, decrease Synthroid to 125 mcg and repeat thyroid panel in 6-8 weeks -> outpatient follow-up  Type 2 diabetes mellitus (HCC)  Lab Results   Component Value Date    HGBA1C 6.2 (H) 10/04/2024     Holding oral hypoglycemics while hospitalized  Continue SSI coverage per Accu-Cheks  Carbohydrate restriction  Hypoglycemia protocol  Essential hypertension  Continue HCTZ/Toprol-XL/Cozaar  CAD (coronary artery disease)  Status post prior angioplasty  Continue Toprol-XL/Cozaar/statin      VTE Pharmacologic " Prophylaxis: VTE Score: 4 Moderate Risk (Score 3-4) - Pharmacological DVT Prophylaxis Ordered: Heparin.    AM-PAC Basic Mobility:  Basic Mobility Inpatient Raw Score: 17  JH-HLM Goal: 5: Stand one or more mins  JH-HLM Achieved: 5: Stand (1 or more minutes)  JH-HLM Goal achieved. Continue to encourage appropriate mobility.    Patient Centered Rounds:  I have performed bedside rounds and discussed plan of care with nursing today.  Discussions with Specialists or Other Care Team Provider:  see above assessments if applicable    Education and Discussions with Family / Patient:  Patient at bedside - revisited later to update daughter, at bedside today    Time Spent for Care:  35 minutes. More than 50% of total time spent on counseling and coordination of care, on one or more of the following: performing physical exam; counseling and coordination of care, obtaining or reviewing history, documenting in the medical record, reviewing/ordering tests/medications/procedures, and communicating with other healthcare professionals.    Current Length of Stay: 0 day(s)  Current Patient Status: Inpatient   Certification Statement:  Patient will continue to require additional hospital stay due to assessments as noted above.    Code Status: Level 1 - Full Code      Subjective     Seen and examined earlier this morning.  Reports improvement in dizziness after initiation of a Meclizine trial.  Reports some sinus congestion.      Objective     Vitals:   Temp (24hrs), Av.8 °F (36.6 °C), Min:97.6 °F (36.4 °C), Max:98.1 °F (36.7 °C)    Temp:  [97.6 °F (36.4 °C)-98.1 °F (36.7 °C)] 98.1 °F (36.7 °C)  HR:  [50-67] 60  Resp:  [18] 18  BP: (104-120)/(44-58) 116/58  SpO2:  [91 %-96 %] 94 %  Body mass index is 31.97 kg/m².     Input and Output Summary (last 24 hours):       Intake/Output Summary (Last 24 hours) at 10/6/2024 1413  Last data filed at 10/6/2024 1301  Gross per 24 hour   Intake --   Output 1000 ml   Net -1000 ml       Physical  Exam:     GENERAL Waxing/waning weakness/fatigue   HEAD   Normocephalic - atraumatic   EYES Nonicteric   MOUTH   Mucosa moist   NECK   Supple - full range of motion   CARDIAC Rate controlled   PULMONARY Clear bilateral breath sounds without labored respirations   ABDOMEN Nontender/nondistended   MUSCULOSKELETAL   Motor strength/range of motion mildly deconditioned   NEUROLOGIC   Alert/oriented at baseline without obvious focal deficits    SKIN   Chronic wrinkles/blemishes    PSYCHIATRIC   Mood/affect stable         Labs & Recent Cultures:  Results from last 7 days   Lab Units 10/05/24  0512   WBC Thousand/uL 10.16   HEMOGLOBIN g/dL 11.1*   HEMATOCRIT % 35.3   PLATELETS Thousands/uL 209   SEGS PCT % 46   LYMPHO PCT % 42   MONO PCT % 8   EOS PCT % 3     Results from last 7 days   Lab Units 10/05/24  0512 10/04/24  1002   POTASSIUM mmol/L 3.7 3.8   CHLORIDE mmol/L 107 101   CO2 mmol/L 27 27   BUN mg/dL 13 18   CREATININE mg/dL 1.08 1.10   CALCIUM mg/dL 8.7 9.7   ALK PHOS U/L  --  39   ALT U/L  --  13   AST U/L  --  18         Results from last 7 days   Lab Units 10/06/24  1111 10/06/24  0749 10/05/24  2016 10/05/24  1607 10/05/24  1105 10/05/24  0734 10/04/24  2142 10/04/24  1714   POC GLUCOSE mg/dl 148* 93 103 103 124 88 98 106     Results from last 7 days   Lab Units 10/04/24  1002   HEMOGLOBIN A1C % 6.2*     Results from last 7 days   Lab Units 10/05/24  0514 10/04/24  1002   PROCALCITONIN ng/ml <0.05 <0.05                 Lines/Drains/Telemetry:  Invasive Devices       Peripheral Intravenous Line  Duration             Peripheral IV 10/04/24 Distal;Dorsal (posterior);Left Forearm 2 days                    Last 24 Hours Medication List:   Current Facility-Administered Medications   Medication Dose Route Frequency Provider Last Rate    acetaminophen  1,000 mg Intravenous Q6H PRN Tiffany Roth MD 1,000 mg (10/05/24 6972)    amLODIPine  5 mg Oral Daily Tiffany Roth MD      fenofibrate  145 mg Oral Daily Tiffany Roth  MD      fish oil  1,000 mg Oral BID Tiffany Roth MD      heparin (porcine)  5,000 Units Subcutaneous Q8H [facility] Tiffany Roth MD      hydroCHLOROthiazide  12.5 mg Oral Daily Tiffany Roth MD      insulin lispro  1-6 Units Subcutaneous 4x Daily (AC & HS) Tiffany Roth MD      levothyroxine  125 mcg Oral Early Morning Tiffany Roth MD      losartan  100 mg Oral Daily Tiffany Roth MD      meclizine  25 mg Oral Q8H [facility] Tiffany Roth MD      [START ON 10/7/2024] meclizine  25 mg Oral Q8H PRN Tiffany Roth MD      metoprolol succinate  25 mg Oral Daily Tiffany Roth MD      nitroglycerin  0.4 mg Sublingual Q5 Min PRN Tiffany Roth MD      ondansetron  4 mg Intravenous Q4H PRN Tiffany Roth MD      pantoprazole  40 mg Oral Early Morning Tiffany Roth MD      pravastatin  40 mg Oral Daily With Dinner Tiffany Roth MD      saccharomyces boulardii  250 mg Oral BID MD TIFFANY Sims MD   Hospitalist - [facility] Internal Medicine        ** Please Note:  Documentation is constructed using a voice recognition dictation system.  An occasional wrong word/phrase or “sound-a-like” substitution may have been picked up by dictation device due to the inherent limitations of voice recognition software.  Read the chart carefully and recognize, using reasonable context, where substitutions may have occurred.**

## 2024-10-06 NOTE — PLAN OF CARE
Problem: SAFETY ADULT  Goal: Patient will remain free of falls  Description: INTERVENTIONS:  - Educate patient/family on patient safety including physical limitations  - Instruct patient to call for assistance with activity   - Consult OT/PT to assist with strengthening/mobility   - Keep Call bell within reach    10/6/2024 1451 by Ana Maria Magallanes RN  Outcome: Progressing  10/6/2024 1450 by Ana Maria Magallanes RN  Outcome: Progressing     Problem: DISCHARGE PLANNING  Goal: Discharge to home or other facility with appropriate resources  Description: INTERVENTIONS:  - Identify barriers to discharge w/patient and caregiver  - Arrange for needed discharge resources and transportation as appropriate  - Identify discharge learning needs (meds, wound care, etc.)  - Arrange for interpretive services to assist at discharge as needed  - Refer to Case Management Department for coordinating discharge planning if the patient needs post-hospital services based on physician/advanced practitioner order or complex needs related to functional status, cognitive ability, or social support system  10/6/2024 1451 by Ana Maria Magallanes RN  Outcome: Progressing  10/6/2024 1450 by Ana Maria Magallanes RN  Outcome: Progressing     Problem: Knowledge Deficit  Goal: Patient/family/caregiver demonstrates understanding of disease process, treatment plan, medications, and discharge instructions  Description: Complete learning assessment and assess knowledge base.  Interventions:  - Provide teaching at level of understanding  - Provide teaching via preferred learning methods  10/6/2024 1451 by Ana Maria Magallanes RN  Outcome: Progressing  10/6/2024 1450 by Ana Maria Magallanes RN  Outcome: Progressing     Problem: NEUROSENSORY - ADULT  Goal: Achieves stable or improved neurological status  Description: INTERVENTIONS  - Monitor and report changes in neurological status  - Monitor vital signs such as temperature, blood pressure, glucose, and any other labs ordered   -  Initiate measures to prevent increased intracranial pressure  - Monitor for seizure activity and implement precautions     10/6/2024 1451 by Ana Maria Magallanes RN  Outcome: Progressing  10/6/2024 1450 by Ana Maria Magallanes RN  Outcome: Progressing     Problem: CARDIOVASCULAR - ADULT  Goal: Maintains optimal cardiac output and hemodynamic stability  Description: INTERVENTIONS:  - Monitor I/O, vital signs and rhythm  - Monitor for S/S and trends of decreased cardiac output  - Administer and titrate ordered vasoactive medications to optimize hemodynamic stability  - Assess quality of pulses, skin color and temperature  - Assess for signs of decreased coronary artery perfusion  - Instruct patient to report change in severity of symptoms  10/6/2024 1451 by Ana Maria Magallanes RN  Outcome: Progressing  10/6/2024 1450 by Ana Maria Magallanes RN  Outcome: Progressing     Problem: METABOLIC, FLUID AND ELECTROLYTES - ADULT  Goal: Electrolytes maintained within normal limits  Description: INTERVENTIONS:  - Monitor labs and assess patient for signs and symptoms of electrolyte imbalances  - Administer electrolyte replacement as ordered  - Monitor response to electrolyte replacements, including repeat lab results as appropriate  - Instruct patient on fluid and nutrition as appropriate  10/6/2024 1451 by Ana Maria Magallanes RN  Outcome: Progressing  10/6/2024 1450 by Ana Maria Magallanes RN  Outcome: Progressing

## 2024-10-06 NOTE — ASSESSMENT & PLAN NOTE
Baseline creatinine of approximately 1.0-1.1 -> stable at 1.08 yesterday  Monitor renal function and urine output  Limit/avoid hypotension and nephrotoxins as possible - note chronic ARB (Cozaar) use

## 2024-10-06 NOTE — ASSESSMENT & PLAN NOTE
"Positional in presentation/description  Orthostatics negative - CT of head negative for pathologic etiology, however, read as \"acute pansinusitis\" (see plan below)  History of CAD, hypertension and diabetes noted -> not unreasonable to pursue MRI to rule out possibility of \"stroke\" -> MRI fortunately negative for acute stroke/infarct  Limit/avoid sedating agents as possible  Symptomatically improved on a trial of Meclizine - continue working with PT/OT and training for Epley maneuvers, etc.   Hopeful discharge tomorrow   "

## 2024-10-06 NOTE — OCCUPATIONAL THERAPY NOTE
"OT EVALUATION       10/06/24 1320   OT Last Visit   OT Visit Date 10/06/24   Pain Assessment   Pain Assessment Tool 0-10   Pain Score No Pain   Restrictions/Precautions   Other Precautions Chair Alarm;Bed Alarm;Fall Risk   Home Living   Type of Home House   Home Layout One level  (2 MAGALIE)   Bathroom Shower/Tub Tub/shower unit   Bathroom Toilet Standard   Bathroom Equipment Shower chair;Grab bars in shower;Grab bars around toilet   Home Equipment Cane;Walker   Prior Function   Level of [place] Independent with ADLs;Independent with IADLS   Lives With Alone   Receives Help From Family   IADLs Independent with driving;Independent with meal prep;Independent with medication management   Falls in the last 6 months 0   Vocational Retired   Lifestyle   Reciprocal Relationships daughter present for end of session   Intrinsic Gratification bowling 2x/week   Subjective   Subjective \"Im alittle better today\"   ADL   Eating Assistance 7  Independent   Grooming Assistance 7  Independent   UB Bathing Assistance 4  Minimal Assistance   LB Bathing Assistance 3  Moderate Assistance   UB Dressing Assistance 4  Minimal Assistance   LB Dressing Assistance 3  Moderate Assistance   Toileting Assistance  4  Minimal Assistance   Bed Mobility   Supine to Sit 5  Supervision   Sit to Supine 5  Supervision   Transfers   Sit to Stand 4  Minimal assistance   Stand to Sit 4  Minimal assistance   Toilet transfer 4  Minimal assistance   Additional items Commode   Functional Mobility   Functional Mobility 4  Minimal assistance   Additional Comments short distance to and from bathroom with RW, increased time with lightheaded and dizziness.   Balance   Static Sitting Fair   Dynamic Sitting Fair   Static Standing Fair -   Dynamic Standing Poor +   Activity Tolerance   Activity Tolerance Patient limited by fatigue  (dizziness and lightheaded with activity)   RUE Assessment   RUE Assessment WFL   LUE Assessment   LUE Assessment WFL   Cognition "   Overall Cognitive Status WFL   Arousal/Participation Cooperative   Attention Within functional limits   Orientation Level Oriented X4   Following Commands Follows all commands and directions without difficulty   Assessment   Limitation Decreased ADL status;Decreased Safe judgement during ADL;Decreased endurance;Decreased self-care trans;Decreased high-level ADLs  (decreased balance and mobility)   Prognosis Good   Assessment Patient evaluated by Occupational Therapy.  Patient admitted with Dizziness.  The patients occupational profile, medical and therapy history includes a extensive additional review of physical, cognitive, or psychosocial history related to current functional performance.  Comorbidities affecting functional mobility and ADLS include: CAD, hypertension, and MI.  Prior to admission, patient was independent with functional mobility without assistive device, independent with ADLS, and independent with IADLS.  The evaluation identifies the following performance deficits: weakness, impaired balance, decreased endurance, increased fall risk, new onset of impairment of functional mobility, decreased ADLS, decreased IADLS, decreased activity tolerance, decreased safety awareness, impaired judgement, and decreased strength, that result in activity limitations and/or participation restrictions. This evaluation requires clinical decision making of high complexity, because the patient presents with comorbidites that affect occupational performance and required significant modification of tasks or assistance with consideration of multiple treatment options.  The Barthel Index was used as a functional outcome tool presenting with a score of Barthel Index Score: 50, indicating marked limitations of functional mobility and ADLS.  The patient's raw score on the AM-PAC Daily Activity Inpatient Short Form is 18. A raw score of less than 19 suggests the patient may benefit from discharge to post-acute rehabilitation  services. Please refer to the recommendation of the Occupational Therapist for safe discharge planning.  Patient will benefit from skilled Occupational Therapy services to address above deficits and facilitate a safe return to prior level of function.   Goals   Patient Goals no dizziness   STG Time Frame   (1-7 days)   Short Term Goal  Goals established to promote Patient Goals: no dizziness:  Grooming: supervision standing at sink; Bathing: min assist; Upper Body Dressing supervision; Lower Body Dressing: min assist; Toileting: supervision; Patient will increase ambulatory standard toilet transfer to supervision with rolling walker to increase performance and safety with ADLS and functional mobility; Patient will increase standing tolerance to 4 minutes during ADL task to decrease assistance level and decrease fall risk; Patient will increase bed mobility to independent in preparation for ADLS and transfers; Patient will increase functional mobility to and from bathroom with rolling walker with supervision to increase performance with ADLS and to use a toilet;  Patient will improve functional activity tolerance to 10 minutes of sustained functional tasks to increase participation in basic self-care and decrease assistance level;  Patient will increase dynamic sitting balance to fair+ to improve the ability to sit at edge of bed or on a chair for ADLS;  Patient will increase dynamic standing balance to fair- to improve postural stability and decrease fall risk during standing ADLS and transfers.   LTG Time Frame   (8-14 days)   Long Term Goal Grooming: independent standing at sink; Bathing: supervision; Upper Body Dressing independent; Lower Body Dressing: supervision; Toileting: independent; Patient will increase ambulatory standard toilet transfer to independent with rolling walker to increase performance and safety with ADLS and functional mobility; Patient will increase standing tolerance to 8 minutes during ADL  task to decrease assistance level and decrease fall risk;  Patient will increase functional mobility to and from bathroom with rolling walker independently to increase performance with ADLS and to use a toilet;  Patient will improve functional activity tolerance to 20 minutes of sustained functional tasks to increase participation in basic self-care and decrease assistance level;  Patient will increase dynamic sitting balance to good to improve the ability to sit at edge of bed or on a chair for ADLS;  Patient will increase dynamic standing balance to fair to improve postural stability and decrease fall risk during standing ADLS and transfers.   Pt will score >/= 22/24 on AM-PAC Daily Activity Inpatient scale to promote safe independence with ADLs and functional mobility; Pt will score >/= 80/100 on Barthel Index in order to decrease caregiver assistance needed and increase ability to perform ADLs and functional mobility.   Plan   Treatment Interventions ADL retraining;Functional transfer training;Endurance training;Patient/family training;Equipment evaluation/education;Activityengagement;Compensatory technique education   Goal Expiration Date 10/20/24   OT Frequency 3-5x/wk   Discharge Recommendation   Rehab Resource Intensity Level, OT II (Moderate Resource Intensity)   AM-PAC Daily Activity Inpatient   Lower Body Dressing 2   Bathing 2   Toileting 3   Upper Body Dressing 3   Grooming 4   Eating 4   Daily Activity Raw Score 18   Daily Activity Standardized Score (Calc for Raw Score >=11) 38.66   AM-PAC Applied Cognition Inpatient   Following a Speech/Presentation 4   Understanding Ordinary Conversation 4   Taking Medications 4   Remembering Where Things Are Placed or Put Away 4   Remembering List of 4-5 Errands 4   Taking Care of Complicated Tasks 4   Applied Cognition Raw Score 24   Applied Cognition Standardized Score 62.21   Barthel Index   Feeding 10   Bathing 0   Grooming Score 0   Dressing Score 5   Bladder  Score 10   Bowels Score 10   Toilet Use Score 5   Transfers (Bed/Chair) Score 10   Mobility (Level Surface) Score 0   Stairs Score 0   Barthel Index Score 50   Licensure   [place] License Number  Lizeth Durham MS OTR/L [id number]

## 2024-10-06 NOTE — ASSESSMENT & PLAN NOTE
Lab Results   Component Value Date    HGBA1C 6.2 (H) 10/04/2024     Holding oral hypoglycemics while hospitalized  Continue SSI coverage per Accu-Cheks  Carbohydrate restriction  Hypoglycemia protocol

## 2024-10-07 VITALS
WEIGHT: 172 LBS | HEIGHT: 62 IN | DIASTOLIC BLOOD PRESSURE: 63 MMHG | TEMPERATURE: 97.2 F | BODY MASS INDEX: 31.65 KG/M2 | SYSTOLIC BLOOD PRESSURE: 123 MMHG | OXYGEN SATURATION: 93 % | HEART RATE: 52 BPM | RESPIRATION RATE: 18 BRPM

## 2024-10-07 LAB
GLUCOSE SERPL-MCNC: 138 MG/DL (ref 65–140)
GLUCOSE SERPL-MCNC: 92 MG/DL (ref 65–140)

## 2024-10-07 PROCEDURE — 99239 HOSP IP/OBS DSCHRG MGMT >30: CPT

## 2024-10-07 PROCEDURE — 97535 SELF CARE MNGMENT TRAINING: CPT

## 2024-10-07 PROCEDURE — 97530 THERAPEUTIC ACTIVITIES: CPT

## 2024-10-07 PROCEDURE — 82948 REAGENT STRIP/BLOOD GLUCOSE: CPT

## 2024-10-07 RX ORDER — ACETAMINOPHEN 325 MG/1
650 TABLET ORAL EVERY 6 HOURS PRN
Status: DISCONTINUED | OUTPATIENT
Start: 2024-10-07 | End: 2024-10-07 | Stop reason: HOSPADM

## 2024-10-07 RX ORDER — LEVOTHYROXINE SODIUM 125 UG/1
125 TABLET ORAL DAILY
Qty: 30 TABLET | Refills: 0 | Status: SHIPPED | OUTPATIENT
Start: 2024-10-07 | End: 2024-11-06

## 2024-10-07 RX ORDER — LEVOTHYROXINE SODIUM 125 UG/1
125 TABLET ORAL
Qty: 30 TABLET | Refills: 0 | Status: SHIPPED | OUTPATIENT
Start: 2024-10-08 | End: 2024-10-07

## 2024-10-07 RX ADMIN — ACETAMINOPHEN 650 MG: 325 TABLET ORAL at 11:17

## 2024-10-07 RX ADMIN — MECLIZINE HYDROCHLORIDE 25 MG: 25 TABLET ORAL at 14:29

## 2024-10-07 RX ADMIN — Medication 250 MG: at 08:46

## 2024-10-07 RX ADMIN — MECLIZINE HYDROCHLORIDE 25 MG: 25 TABLET ORAL at 06:51

## 2024-10-07 RX ADMIN — HYDROCHLOROTHIAZIDE 12.5 MG: 12.5 TABLET ORAL at 08:46

## 2024-10-07 RX ADMIN — PANTOPRAZOLE SODIUM 40 MG: 40 TABLET, DELAYED RELEASE ORAL at 06:51

## 2024-10-07 RX ADMIN — ACETAMINOPHEN 650 MG: 325 TABLET ORAL at 02:35

## 2024-10-07 RX ADMIN — AMLODIPINE BESYLATE 5 MG: 5 TABLET ORAL at 08:46

## 2024-10-07 RX ADMIN — FLUTICASONE PROPIONATE 1 SPRAY: 50 SPRAY, METERED NASAL at 08:49

## 2024-10-07 RX ADMIN — OMEGA-3 FATTY ACIDS CAP 1000 MG 1000 MG: 1000 CAP at 08:46

## 2024-10-07 RX ADMIN — LEVOTHYROXINE SODIUM 125 MCG: 125 TABLET ORAL at 06:51

## 2024-10-07 RX ADMIN — LOSARTAN POTASSIUM 100 MG: 50 TABLET, FILM COATED ORAL at 08:46

## 2024-10-07 RX ADMIN — FENOFIBRATE 145 MG: 145 TABLET, FILM COATED ORAL at 08:46

## 2024-10-07 RX ADMIN — METOPROLOL SUCCINATE 25 MG: 25 TABLET, EXTENDED RELEASE ORAL at 08:46

## 2024-10-07 NOTE — PLAN OF CARE
Problem: OCCUPATIONAL THERAPY ADULT  Goal: Performs self-care activities at highest level of function for planned discharge setting.  See evaluation for individualized goals.  Description: Treatment Interventions: ADL retraining, Functional transfer training, Endurance training, Patient/family training, Equipment evaluation/education, Activityengagement, Compensatory technique education          See flowsheet documentation for full assessment, interventions and recommendations.   Outcome: Progressing  Note: Limitation: Decreased ADL status, Decreased Safe judgement during ADL, Decreased endurance, Decreased self-care trans, Decreased high-level ADLs (decreased balance and mobility)  Prognosis: Good  Assessment: Pt seen for skilled OT tx session day 1 focusing on activity engagement, challenging activity tolerance, ongoing eval and pt education. Pt agreeable to participate and motivated to return home to baseline level of I. Pt required less physical assistance and demonstrated improved activity tolerance. Pt tolerated standing at sink w/ fair balance to participate in grooming and engaged in functional mobility w/ use ofRW w/ S. From an OT perpsective, recommend level III rehab resource intensity when medically stable for discharge from acute care. Will continue to follow     Rehab Resource Intensity Level, OT: III (Minimum Resource Intensity) (DC home using RW w/ OPPT)

## 2024-10-07 NOTE — PLAN OF CARE
Problem: PHYSICAL THERAPY ADULT  Goal: Performs mobility at highest level of function for planned discharge setting.  See evaluation for individualized goals.  Description: Treatment/Interventions: Functional transfer training, LE strengthening/ROM, Endurance training, Therapeutic exercise, Gait training, Spoke to nursing          See flowsheet documentation for full assessment, interventions and recommendations.  Outcome: Progressing  Note: Prognosis: Good  Problem List: Impaired balance, Decreased mobility  Assessment: Pt seen for PT session this afternoon. Pt making progress towards IP PT goals + reports of improved dizziness reported, now mild in nature mostly with transfers to standing. Able to progress ambulation x increased distance with decreased level of assist using RW. Instructed in gaze stabilization to decrease dizziness with good response, mild improvement noted. Discussed importance of follow-up with OP PT ; pt + dtr verbalized understanding. Repositioned in supine with all needs within reach.        Rehab Resource Intensity Level, PT: III (Minimum Resource Intensity)    See flowsheet documentation for full assessment.

## 2024-10-07 NOTE — ASSESSMENT & PLAN NOTE
Baseline creatinine of approximately 1.0-1.1 -> stable at 1.08   Monitor renal function and urine output  Limit/avoid hypotension and nephrotoxins as possible - note chronic ARB (Cozaar) use

## 2024-10-07 NOTE — PLAN OF CARE
Problem: SAFETY ADULT  Goal: Patient will remain free of falls  Description: INTERVENTIONS:  - Educate patient/family on patient safety including physical limitations  - Instruct patient to call for assistance with activity   - Consult OT/PT to assist with strengthening/mobility   - Keep Call bell within reach    10/7/2024 1132 by Nicolas Edmondson RN  Outcome: Progressing  10/7/2024 1131 by Nicolas Edmondson RN  Outcome: Progressing     Problem: DISCHARGE PLANNING  Goal: Discharge to home or other facility with appropriate resources  Description: INTERVENTIONS:  - Identify barriers to discharge w/patient and caregiver  - Arrange for needed discharge resources and transportation as appropriate  - Identify discharge learning needs (meds, wound care, etc.)  - Arrange for interpretive services to assist at discharge as needed  - Refer to Case Management Department for coordinating discharge planning if the patient needs post-hospital services based on physician/advanced practitioner order or complex needs related to functional status, cognitive ability, or social support system  10/7/2024 1132 by Nicolas Edmondson RN  Outcome: Progressing  10/7/2024 1131 by Nicolas Edmondson RN  Outcome: Progressing     Problem: Knowledge Deficit  Goal: Patient/family/caregiver demonstrates understanding of disease process, treatment plan, medications, and discharge instructions  Description: Complete learning assessment and assess knowledge base.  Interventions:  - Provide teaching at level of understanding  - Provide teaching via preferred learning methods  10/7/2024 1132 by Nicolas Edmondson RN  Outcome: Progressing  10/7/2024 1131 by Nicolas Edmondson RN  Outcome: Progressing     Problem: NEUROSENSORY - ADULT  Goal: Achieves stable or improved neurological status  Description: INTERVENTIONS  - Monitor and report changes in neurological status  - Monitor vital signs such as temperature, blood pressure, glucose, and any other labs ordered   -  Initiate measures to prevent increased intracranial pressure  - Monitor for seizure activity and implement precautions     10/7/2024 1132 by Nicolas Edmondson RN  Outcome: Progressing  10/7/2024 1131 by Nicolas Edmondson RN  Outcome: Progressing     Problem: CARDIOVASCULAR - ADULT  Goal: Maintains optimal cardiac output and hemodynamic stability  Description: INTERVENTIONS:  - Monitor I/O, vital signs and rhythm  - Monitor for S/S and trends of decreased cardiac output  - Administer and titrate ordered vasoactive medications to optimize hemodynamic stability  - Assess quality of pulses, skin color and temperature  - Assess for signs of decreased coronary artery perfusion  - Instruct patient to report change in severity of symptoms  10/7/2024 1132 by Nicolas Edmondson RN  Outcome: Progressing  10/7/2024 1131 by Nicolas Edmondson RN  Outcome: Progressing     Problem: METABOLIC, FLUID AND ELECTROLYTES - ADULT  Goal: Electrolytes maintained within normal limits  Description: INTERVENTIONS:  - Monitor labs and assess patient for signs and symptoms of electrolyte imbalances  - Administer electrolyte replacement as ordered  - Monitor response to electrolyte replacements, including repeat lab results as appropriate  - Instruct patient on fluid and nutrition as appropriate  10/7/2024 1132 by Nicolas Edmondson RN  Outcome: Progressing  10/7/2024 1131 by Nicolas Edmondson RN  Outcome: Progressing

## 2024-10-07 NOTE — PHYSICAL THERAPY NOTE
"   PT TREATMENT       10/07/24 1314   PT Last Visit   PT Visit Date 10/07/24   Note Type   Note Type Treatment   Pain Assessment   Pain Assessment Tool 0-10   Pain Score No Pain   Patient's Stated Pain Goal No pain   Multiple Pain Sites No   Restrictions/Precautions   Weight Bearing Precautions Per Order No   Other Precautions Bed Alarm;Chair Alarm;Fall Risk   General   Chart Reviewed Yes   Family/Caregiver Present Yes  (dtr at bedside throughout session)   Cognition   Overall Cognitive Status WFL   Arousal/Participation Cooperative   Orientation Level Oriented X4   Following Commands Follows multistep commands with increased time or repetition   Subjective   Subjective \"I'm feeling okay sitting. We will see how I feel when I stand up\"   Bed Mobility   Supine to Sit Unable to assess  (Received sitting at EOB)   Sit to Supine 5  Supervision   Additional items Assist x 1;Verbal cues;Increased time required;Bedrails   Transfers   Sit to Stand 5  Supervision   Additional items Assist x 1;Verbal cues;Increased time required   Stand to Sit 5  Supervision   Additional items Assist x 1;Verbal cues;Increased time required   Additional Comments Pt with mild L beat nystagmus when looking L ; reports increased dizziness when looking side to side quickly ; held further vestibular testing as pt reports feeling improved ; discussed follow-up with OP PT and pt verbalized understanding   Ambulation/Elevation   Gait pattern Foward flexed;Short stride;Step through pattern  (decreased gait speed)   Gait Assistance 5  Supervision   Additional items Verbal cues   Assistive Device Rolling walker   Distance 60 feet with change of direction   Stair Management Assistance Not tested   Ambulation/Elevation Additional Comments Pt reports mild increase in dizziness when ambulating ; improved as session progressed   Balance   Static Sitting Fair +   Dynamic Sitting Fair   Static Standing Fair   Dynamic Standing Fair   Ambulatory Fair -  (RW) "   Activity Tolerance   Activity Tolerance Patient tolerated treatment well;Patient limited by fatigue   Assessment   Prognosis Good   Problem List Impaired balance;Decreased mobility   Assessment Pt seen for PT session this afternoon. Pt making progress towards IP PT goals + reports of improved dizziness reported, now mild in nature mostly with transfers to standing. Able to progress ambulation x increased distance with decreased level of assist using RW. Instructed in gaze stabilization to decrease dizziness with good response, mild improvement noted. Discussed importance of follow-up with OP PT ; pt + dtr verbalized understanding. Repositioned in supine with all needs within reach.  The patient's AM-PAC Basic Mobility Inpatient Short Form Raw Score is 19. A Raw score of greater than 16 suggests the patient may benefit from discharge to home. Please also refer to the recommendation of the Physical Therapist for safe discharge planning.     Goals   Patient Goals to feel better   Plan   Treatment/Interventions Functional transfer training;LE strengthening/ROM;Endurance training;Therapeutic exercise;Gait training;Spoke to nursing   Progress Progressing toward goals   PT Frequency Other (Comment)  (5x/week)   Discharge Recommendation   Rehab Resource Intensity Level, PT III (Minimum Resource Intensity)   AM-PAC Basic Mobility Inpatient   Turning in Flat Bed Without Bedrails 4   Lying on Back to Sitting on Edge of Flat Bed Without Bedrails 3   Moving Bed to Chair 3   Standing Up From Chair Using Arms 3   Walk in Room 3   Climb 3-5 Stairs With Railing 3   Basic Mobility Inpatient Raw Score 19   Basic Mobility Standardized Score 42.48   [facility] Highest Level Of Mobility   -HLM Goal 6: Walk 10 steps or more   -HLM Achieved 7: Walk 25 feet or more   Education   Education Provided Mobility training;Assistive device   Patient Demonstrates verbal understanding   End of Consult   Patient Position at End of Consult  Supine;All needs within reach   Licensure   [place] License Number  Lizeth Adamson [id number]

## 2024-10-07 NOTE — ASSESSMENT & PLAN NOTE
"Positional in presentation/description  Orthostatics negative - CT of head negative for pathologic etiology, however, read as \"acute pansinusitis\" (see plan below)  History of CAD, hypertension and diabetes noted -> not unreasonable to pursue MRI to rule out possibility of \"stroke\" -> MRI fortunately negative for acute stroke/infarct  Limit/avoid sedating agents as possible  Symptomatically improved on a trial of Meclizine - continue working with PT/OT and training for Epley maneuvers, etc.   PT/OT recommended outpatient physical therapy  "

## 2024-10-08 ENCOUNTER — TRANSITIONAL CARE MANAGEMENT (OUTPATIENT)
Dept: FAMILY MEDICINE CLINIC | Facility: CLINIC | Age: 84
End: 2024-10-08

## 2024-10-08 ENCOUNTER — TELEPHONE (OUTPATIENT)
Dept: FAMILY MEDICINE CLINIC | Facility: CLINIC | Age: 84
End: 2024-10-08

## 2024-10-08 DIAGNOSIS — E78.2 MIXED HYPERLIPIDEMIA: ICD-10-CM

## 2024-10-08 DIAGNOSIS — E11.9 TYPE 2 DIABETES MELLITUS WITHOUT COMPLICATION, WITHOUT LONG-TERM CURRENT USE OF INSULIN (HCC): Primary | ICD-10-CM

## 2024-10-08 NOTE — TELEPHONE ENCOUNTER
The patient returned the call  to schedule a tcm appointment   I called over to the office but the staff was not available  please call her back    she also wanted you to know she has a awv scheduled for 10/17/24

## 2024-10-08 NOTE — TELEPHONE ENCOUNTER
"Dr Wong,    I spoke with Adelina's daughter Keesha as Adelina was sleeping during the call. She is questioning her Metformin medication. She states that her mother cannot find the bottle. She Is unsure when the last time was that she took it but she asked for a refill. I only see this listed as \"historical provider\"   I then read the hospital note which states: \"Holding oral hypoglycemics while hospitalized \"    She uses [facility] but I did not attach the med since I am unsure if you plan to have her take this. Keesha is aware you will receive this message tomorrow and we will get back to her.   Also, she will need repeat TSH since they changed her dose and I told her you would address this at her TCM appt [email or web address]  "

## 2024-10-08 NOTE — TELEPHONE ENCOUNTER
First attempt to reach Adelina. Message left to call back on 10/8/24 so I can schedule a TCM visit for her OR change her AWV to a TCM visit Edgar

## 2024-10-09 ENCOUNTER — EVALUATION (OUTPATIENT)
Facility: CLINIC | Age: 84
End: 2024-10-09
Payer: MEDICARE

## 2024-10-09 DIAGNOSIS — R42 DIZZINESS: ICD-10-CM

## 2024-10-09 DIAGNOSIS — H81.12 BENIGN PAROXYSMAL POSITIONAL VERTIGO OF LEFT EAR: Primary | ICD-10-CM

## 2024-10-09 DIAGNOSIS — R26.89 IMBALANCE: ICD-10-CM

## 2024-10-09 PROCEDURE — 97161 PT EVAL LOW COMPLEX 20 MIN: CPT

## 2024-10-09 RX ORDER — SIMVASTATIN 20 MG
20 TABLET ORAL
Qty: 90 TABLET | Refills: 1 | Status: SHIPPED | OUTPATIENT
Start: 2024-10-09

## 2024-10-09 NOTE — PROGRESS NOTES
PT Evaluation          POC expires Unit limit Auth Expiration date PT/OT + Visit Limit? Co-Insurance   2024   BOMN Yes                               Visit/Unit Tracking  AUTH Status:  Date               None  Used                Remaining                      Today's date: 10/9/2024  Patient name: Adelina Dyer  : 1940  MRN: [id number]  Referring provider: Narciso Ford CR*  Dx:   Encounter Diagnosis     ICD-10-CM    1. Benign paroxysmal positional vertigo of left ear  H81.12       2. Dizziness  R42 Ambulatory referral to Physical Therapy      3. Imbalance  R26.89           Assessment  Assessment details: Patient is a 83 y.o. Female who presents to skilled outpatient PT with symptoms of dizziness that began last week. Cervical spine integrity intact per normal and negative results of mVBI, Sharp Abelardo, and Alar Stability Tests respectively. 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 Trialed L Gufoni Maneuver today with Excellent results and eventual resolution of symptoms by final repetition. Educated the patient on the anatomy of the inner ear, potential for residual dizziness for up to 48 hours following session, and to seek higher level of care if symptoms worsen or change and She was in good verbal understanding. In addition, educated patient to avoid taking meclizine unless absolutely necessary and she was in agreement. She will benefit from skilled outpatient PT in order to reduce dizziness symptoms and return to PLOF.      Patient verbalized understanding of POC.    Please contact me if you have any questions or recommendations. Thank you for the referral and the opportunity to share in Adelina Dyer's care.      Cut off score   All date taken from [employer] Neuro Section or Rehab Measures    DGI:  MDC for Vestibular Disorders: 4 points  MDC for  Geriatrics/Community Dwelling Older Adults: 3 Points  Falls risk cut off: <19/24    FGA:  MCID: 4 points  Geriatrics/Community Dwelling Older Adults: </= 22/30 fall risk  Geriatrics/Community Dwelling Older Adults: </= 20/30 unexplained falls in the next 6 months  Parkinsons: </= 18/30 fall risk    mCTSIB (normed on ages 20-60, lower number is less sway or better static balance)  Eyes open firm surface (norm 0.21-0.48)  Eyes closed firm surface (norm 0.48-0.99)  Eyes open foam surface (norm 0.38-0.71)  Eyes closed foam surface (norm 0.70-2.22)    DHI:  0-39: low perception of handicap  40-69: moderate perception of handicap  : severe perception of handicap  > 60: increased risk for falls    Joint Position Error Testing (JPET):  > 4.5 degrees: abnormal joint proprioception        Impairments: Abnormal coordination, abnormal gait, abnormal muscle tone, abnormal or restricted ROM, activity intolerance, impaired balance, impaired physical strength, lacks appropriate HEP, poor posture, poor body mechanics, pain with function, safety issue, abnormal movement  Understanding of Dx/Px/POC: good  Prognosis: excellent      Goals    BPPV Goals (4 weeks):  - Patient will report complete resolution of symptoms in order to promote return to PLOF  - Patient will complete FGA in order to promote return to safe performance of ADLs  - Patient will demonstrate (-) Jana-Hallpike test on L side  - Patient will demonstrate (-) Roll Test on L side      Plan  Plan details: CRM, balance   Patient would benefit from: Skilled PT  Planned modality interventions: Biofeedback, Cryotherapy, TENS, Thermotherapy  Planned therapy interventions: Abdominal trunk stabilization, ADL training, balance, balance/WB training, breathing training, body mechanics training, coordination, flexibility, functional ROM exercises, gait training, HEP, manual therapy, Lee Taping, motor coordination training, neuromuscular re-education, patient education,  "postural training, strengthening, stretching, therapeutic activities, therapeutic exercises, work re-integration  Frequency: 2x/wk  Duration in weeks: 6  Plan of Care beginning date: 10/9/2024  Plan of Care expiration date: 6 weeks - 11/20/2024  Treatment plan discussed with: patient         Subjective Evaluation    History of Present Illness  Mechanism of injury: Patient presents to physical therapy with reports of dizziness that began last Friday (Oct 4th, 2024). Went to the hospital which revealed she had acute pansinusitiswhich may be contributing to her symptoms. She reports that she cannot describe her dizziness. Initially, patient states that the dizziness was constant, however now it is not as frequent. She takes meclizine every day due to intensity of symptoms.     Patient goal: be asymptomatic       Dizziness Subjective  How long does dizziness last: initially, constant. Currently, only getting up   How would you describe the dizziness: \"I'm unsure\"  Rolling in bed: No  Supine to/from sit: No  Recent hearing loss: No  Tinnitus: No  Aural fullness/ear pain: No  Vision changes: Yes  History of recent viral infections: Yes, Orthostatics negative - CT of head negative for pathologic etiology, however, read as \"acute pansinusitis\"   History of migraines: No    Red Flag Screen  - Numbness: Yes, \"in my legs at night sometimes\"  - Tingling: Yes, \"in my legs at night sometimes\"  - Weakness: Yes, \"in my legs at night sometimes\"  - Unilateral hearing loss: No  - Slurred speech: No  - Progressive hearing loss: No  - Tremors: No  - Poor coordination: No  - UMN signs: No  - LoC: No  - Rigidity: No  - Visual field loss: No  - Memory loss: No  - CN dysfunction: No  - Vertical nystagmus: No    Pain  No pain reported     Social Support  Steps to enter house: 2  Stairs in house: 1   Lives in: house   Lives with: alone     Employment status: retired   Hand dominance: R    Treatments  Previous treatment: none  Current " "treatment: none   Diagnostic Testing:   Abnormal CT scan  CT imaging with radiologist read of \"acute pansinusitis\"  Patient currently without sinus symptoms - remains afebrile without leukocytosis  Given a dose of IV Unasyn in the ED -> observe off further antibiotics for now, as even if true infection, more likely viral in nature  Procalcitonin normal       Objective     Vestibular Objective  Cervical Spine AROM:  - Flexion: minimal limitation no pain  - Extension: moderate limitation no pain  - R Rotation: moderate limitation pain at end range  - L Rotation: moderate limitation pain at end range  - R Lateral Flexion: severe limitation no pain  - L Lateral Flexion: severe limitation no pain    Integrity Testing  - mVBI: intact   - Sharp Abelardo: intact  - Alar Stability Test: intact       Coordination Screen  - Dysmetria: intact  - Dysdiadochokinesia: intact     BPPV Screen  - L [place]-Hallpike: neg  - R Jana-Hallpike: neg  - L Horizontal Roll: 30 sec of geotropic nystagmus   - R Horizontal Roll: neg    Treatment:   MARISA Newell x2     Outcome Measures Initial Eval  10/9/2024        mCTSIB  - FTEO (firm)  - FTEC (firm)  - FTEO (foam)  - FTEC (foam)   defer        DGI defer/24        FGA defer/30        DHI defer/100        JPET defer degrees                                                          Precautions: HT  Past Medical History:   Diagnosis Date    Coronary artery disease     Hyperlipidemia     Hypertension     Hypothyroidism     MI, old     UTI (urinary tract infection) 07/26/2022                                                                               "

## 2024-10-09 NOTE — TELEPHONE ENCOUNTER
I left a message for Adelina Thao's daughter to call back. Please give her the message from Dr Wong when she calls back Edgar

## 2024-10-10 ENCOUNTER — OFFICE VISIT (OUTPATIENT)
Facility: CLINIC | Age: 84
End: 2024-10-10
Payer: MEDICARE

## 2024-10-10 DIAGNOSIS — R26.89 IMBALANCE: ICD-10-CM

## 2024-10-10 DIAGNOSIS — H81.12 BENIGN PAROXYSMAL POSITIONAL VERTIGO OF LEFT EAR: Primary | ICD-10-CM

## 2024-10-10 DIAGNOSIS — R42 DIZZINESS: ICD-10-CM

## 2024-10-10 PROCEDURE — 97112 NEUROMUSCULAR REEDUCATION: CPT

## 2024-10-10 NOTE — PROGRESS NOTES
Daily Note     Today's date: 10/10/2024  Patient name: Adelina Dyer  : 1940  MRN: [id number]  Referring provider: Narciso Ford CR*  Dx:   Encounter Diagnosis     ICD-10-CM    1. Benign paroxysmal positional vertigo of left ear  H81.12       2. Imbalance  R26.89       3. Dizziness  R42           Subjective: Patient states that she is feeling better and has not had any dizziness.       Objective: See treatment diary below  NMR:   - L Roll test: positive for L geotropic nystagmus for 5 sec   - L Gufoni: x4    Assessment: Tolerated treatment well with a focus on CRM. 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 Trialed L Gufoni Maneuver today x4  with Excellent results and eventual resolution of symptoms by final repetition. Educated the patient on the anatomy of the inner ear, potential for residual dizziness for up to 48 hours following session, and to seek higher level of care if symptoms worsen or change and She was in good verbal understanding. She will benefit from skilled outpatient PT in order to reduce dizziness symptoms and return to PLOF.      Plan: Continue per plan of care.      POC expires Unit limit Auth Expiration date PT/OT + Visit Limit? Co-Insurance   2024   BOMN Yes                               Visit/Unit Tracking  AUTH Status:  Date               None  Used                Remaining                      Today's date: 10/9/2024  Patient name: Adelina Dyer  : 1940  MRN: [id number]  Referring provider: Narciso Ford CR*  Dx:   Encounter Diagnosis     ICD-10-CM    1. Benign paroxysmal positional vertigo of left ear  H81.12       2. Dizziness  R42 Ambulatory referral to Physical Therapy      3. Imbalance  R26.89           Assessment  Assessment details: Patient is a 83 y.o. Female who presents to skilled outpatient PT with symptoms of dizziness that began last week. Cervical spine  integrity intact per normal and negative results of mVBI, Sharp Abelardo, and Alar Stability Tests respectively. 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 Trialed L Gufoni Maneuver today with Excellent results and eventual resolution of symptoms by final repetition. Educated the patient on the anatomy of the inner ear, potential for residual dizziness for up to 48 hours following session, and to seek higher level of care if symptoms worsen or change and She was in good verbal understanding. In addition, educated patient to avoid taking meclizine unless absolutely necessary and she was in agreement. She will benefit from skilled outpatient PT in order to reduce dizziness symptoms and return to PLOF.      Patient verbalized understanding of POC.    Please contact me if you have any questions or recommendations. Thank you for the referral and the opportunity to share in Adelina Dyer's care.      Cut off score   All date taken from [employer] Neuro Section or Rehab Measures    DGI:  MDC for Vestibular Disorders: 4 points  MDC for Geriatrics/Community Dwelling Older Adults: 3 Points  Falls risk cut off: <19/24    FGA:  MCID: 4 points  Geriatrics/Community Dwelling Older Adults: </= 22/30 fall risk  Geriatrics/Community Dwelling Older Adults: </= 20/30 unexplained falls in the next 6 months  Parkinsons: </= 18/30 fall risk    mCTSIB (normed on ages 20-60, lower number is less sway or better static balance)  Eyes open firm surface (norm 0.21-0.48)  Eyes closed firm surface (norm 0.48-0.99)  Eyes open foam surface (norm 0.38-0.71)  Eyes closed foam surface (norm 0.70-2.22)    DHI:  0-39: low perception of handicap  40-69: moderate perception of handicap  : severe perception of handicap  > 60: increased risk for falls    Joint Position Error Testing (JPET):  > 4.5 degrees: abnormal joint proprioception        Impairments: Abnormal coordination, abnormal  gait, abnormal muscle tone, abnormal or restricted ROM, activity intolerance, impaired balance, impaired physical strength, lacks appropriate HEP, poor posture, poor body mechanics, pain with function, safety issue, abnormal movement  Understanding of Dx/Px/POC: good  Prognosis: excellent      Goals    BPPV Goals (4 weeks):  - Patient will report complete resolution of symptoms in order to promote return to PLOF  - Patient will complete FGA in order to promote return to safe performance of ADLs  - Patient will demonstrate (-) Jana-Hallpike test on L side  - Patient will demonstrate (-) Roll Test on L side      Plan  Plan details: CRM, balance   Patient would benefit from: Skilled PT  Planned modality interventions: Biofeedback, Cryotherapy, TENS, Thermotherapy  Planned therapy interventions: Abdominal trunk stabilization, ADL training, balance, balance/WB training, breathing training, body mechanics training, coordination, flexibility, functional ROM exercises, gait training, HEP, manual therapy, Lee Taping, motor coordination training, neuromuscular re-education, patient education, postural training, strengthening, stretching, therapeutic activities, therapeutic exercises, work re-integration  Frequency: 2x/wk  Duration in weeks: 6  Plan of Care beginning date: 10/9/2024  Plan of Care expiration date: 6 weeks - 11/20/2024  Treatment plan discussed with: patient         Subjective Evaluation    History of Present Illness  Mechanism of injury: Patient presents to physical therapy with reports of dizziness that began last Friday (Oct 4th, 2024). Went to the hospital which revealed she had acute pansinusitiswhich may be contributing to her symptoms. She reports that she cannot describe her dizziness. Initially, patient states that the dizziness was constant, however now it is not as frequent. She takes meclizine every day due to intensity of symptoms.     Patient goal: be asymptomatic       Dizziness  "Subjective  How long does dizziness last: initially, constant. Currently, only getting up   How would you describe the dizziness: \"I'm unsure\"  Rolling in bed: No  Supine to/from sit: No  Recent hearing loss: No  Tinnitus: No  Aural fullness/ear pain: No  Vision changes: Yes  History of recent viral infections: Yes, Orthostatics negative - CT of head negative for pathologic etiology, however, read as \"acute pansinusitis\"   History of migraines: No    Red Flag Screen  - Numbness: Yes, \"in my legs at night sometimes\"  - Tingling: Yes, \"in my legs at night sometimes\"  - Weakness: Yes, \"in my legs at night sometimes\"  - Unilateral hearing loss: No  - Slurred speech: No  - Progressive hearing loss: No  - Tremors: No  - Poor coordination: No  - UMN signs: No  - LoC: No  - Rigidity: No  - Visual field loss: No  - Memory loss: No  - CN dysfunction: No  - Vertical nystagmus: No    Pain  No pain reported     Social Support  Steps to enter house: 2  Stairs in house: 1   Lives in: house   Lives with: alone     Employment status: retired   Hand dominance: R    Treatments  Previous treatment: none  Current treatment: none   Diagnostic Testing:   Abnormal CT scan  CT imaging with radiologist read of \"acute pansinusitis\"  Patient currently without sinus symptoms - remains afebrile without leukocytosis  Given a dose of IV Unasyn in the ED -> observe off further antibiotics for now, as even if true infection, more likely viral in nature  Procalcitonin normal       Objective     Vestibular Objective  Cervical Spine AROM:  - Flexion: minimal limitation no pain  - Extension: moderate limitation no pain  - R Rotation: moderate limitation pain at end range  - L Rotation: moderate limitation pain at end range  - R Lateral Flexion: severe limitation no pain  - L Lateral Flexion: severe limitation no pain    Integrity Testing  - mVBI: intact   - Sharp Abelardo: intact  - Alar Stability Test: intact       Coordination Screen  - Dysmetria: " intact  - Dysdiadochokinesia: intact     BPPV Screen  - L Jana-Hallpike: neg  - R [place]-Hallpike: neg  - L Horizontal Roll: 30 sec of geotropic nystagmus   - R Horizontal Roll: neg    Treatment:   MARISA Newell x2     Outcome Measures Initial Eval  10/9/2024        mCTSIB  - FTEO (firm)  - FTEC (firm)  - FTEO (foam)  - FTEC (foam)   defer        DGI defer/24        FGA defer/30        DHI defer/100        JPET defer degrees                                                          Precautions: HT  Past Medical History:   Diagnosis Date    Coronary artery disease     Hyperlipidemia     Hypertension     Hypothyroidism     MI, old     UTI (urinary tract infection) 07/26/2022

## 2024-10-14 NOTE — TELEPHONE ENCOUNTER
Shira (daughter) returned call, Gave Dr Wong message. No further questions    Hydroquinone Counseling:  Patient advised that medication may result in skin irritation, lightening (hypopigmentation), dryness, and burning.  In the event of skin irritation, the patient was advised to reduce the amount of the drug applied or use it less frequently.  Rarely, spots that are treated with hydroquinone can become darker (pseudoochronosis).  Should this occur, patient instructed to stop medication and call the office. The patient verbalized understanding of the proper use and possible adverse effects of hydroquinone.  All of the patient's questions and concerns were addressed.

## 2024-10-17 ENCOUNTER — OFFICE VISIT (OUTPATIENT)
Dept: FAMILY MEDICINE CLINIC | Facility: CLINIC | Age: 84
End: 2024-10-17
Payer: MEDICARE

## 2024-10-17 VITALS
HEART RATE: 48 BPM | DIASTOLIC BLOOD PRESSURE: 76 MMHG | SYSTOLIC BLOOD PRESSURE: 130 MMHG | TEMPERATURE: 97.6 F | RESPIRATION RATE: 16 BRPM | WEIGHT: 168 LBS | HEIGHT: 62 IN | BODY MASS INDEX: 30.91 KG/M2

## 2024-10-17 DIAGNOSIS — E78.2 MIXED HYPERLIPIDEMIA: ICD-10-CM

## 2024-10-17 DIAGNOSIS — E03.9 HYPOTHYROIDISM, UNSPECIFIED TYPE: ICD-10-CM

## 2024-10-17 DIAGNOSIS — R42 VERTIGO: Primary | ICD-10-CM

## 2024-10-17 DIAGNOSIS — J32.4 PANSINUSITIS, UNSPECIFIED CHRONICITY: ICD-10-CM

## 2024-10-17 DIAGNOSIS — I10 ESSENTIAL HYPERTENSION: ICD-10-CM

## 2024-10-17 DIAGNOSIS — I25.10 CORONARY ARTERY DISEASE INVOLVING NATIVE CORONARY ARTERY OF NATIVE HEART WITHOUT ANGINA PECTORIS: ICD-10-CM

## 2024-10-17 DIAGNOSIS — N18.30 STAGE 3 CHRONIC KIDNEY DISEASE, UNSPECIFIED WHETHER STAGE 3A OR 3B CKD (HCC): ICD-10-CM

## 2024-10-17 PROBLEM — E11.9 TYPE 2 DIABETES MELLITUS (HCC): Status: RESOLVED | Noted: 2024-10-04 | Resolved: 2024-10-17

## 2024-10-17 PROBLEM — R93.89 ABNORMAL CT SCAN: Status: RESOLVED | Noted: 2024-10-04 | Resolved: 2024-10-17

## 2024-10-17 PROCEDURE — 99495 TRANSJ CARE MGMT MOD F2F 14D: CPT | Performed by: INTERNAL MEDICINE

## 2024-10-17 RX ORDER — AMOXICILLIN 875 MG/1
875 TABLET, COATED ORAL 2 TIMES DAILY
Qty: 20 TABLET | Refills: 0 | Status: SHIPPED | OUTPATIENT
Start: 2024-10-17 | End: 2024-10-27

## 2024-10-17 NOTE — ASSESSMENT & PLAN NOTE
Levothyroxine dosage was adjusted in the hospital, decreased to 125 mcg.  Will check TSH in  6 weeks.     Orders:    TSH, 3rd generation with Free T4 reflex; Future

## 2024-10-17 NOTE — ASSESSMENT & PLAN NOTE
Lab Results   Component Value Date    EGFR 46 10/05/2024    EGFR 45 10/04/2024    EGFR 43 05/17/2024    CREATININE 1.08 10/05/2024    CREATININE 1.10 10/04/2024    CREATININE 1.14 05/17/2024     Stable and will continue to monitor.

## 2024-10-17 NOTE — PROGRESS NOTES
Transition of Care Visit  Name: Adelina Dyer      : 1940      MRN: [id number]  Encounter Provider: Destinee Wong MD  Encounter Date: 10/17/2024   Encounter department: [facility]    Assessment & Plan  Vertigo  She will continue meclizine PRN, and continue PT for balance and treatment.        Pansinusitis, unspecified chronicity  She will continue flonase and gave written instructions to take 2 puffs once a day, and to aim towards sinuses.  Follow up if not improving.   Orders:    amoxicillin (AMOXIL) 875 mg tablet; Take 1 tablet (875 mg total) by mouth 2 (two) times a day for 10 days    Stage 3 chronic kidney disease, unspecified whether stage 3a or 3b CKD (HCC)  Lab Results   Component Value Date    EGFR 46 10/05/2024    EGFR 45 10/04/2024    EGFR 43 2024    CREATININE 1.08 10/05/2024    CREATININE 1.10 10/04/2024    CREATININE 1.14 2024     Stable and will continue to monitor.         Essential hypertension  Well controlled on current medications.  Will continue valsartan, amlodipine, hydrochlorothiazide, and metoprolol as ordered.          Mixed hyperlipidemia  Stable, continue fenofibrate and simvastatin.         Hypothyroidism, unspecified type  Levothyroxine dosage was adjusted in the hospital, decreased to 125 mcg.  Will check TSH in  6 weeks.     Orders:    TSH, 3rd generation with Free T4 reflex; Future    Coronary artery disease involving native coronary artery of native heart without angina pectoris  Ruled out for acute MI.              History of Present Illness     Transitional Care Management Review:   Adelina Dyer is a 83 y.o. female here for TCM follow up.     During the TCM phone call patient stated:  TCM Call       Date and time call was made  10/8/2024  8:44 AM    Hospital care reviewed  Records reviewed    Patient was hospitial[facility] at  [facility]    Date of Admission  10/04/24    Date of discharge  10/07/24    Diagnosis  Dizziness    Disposition   Home    Were the patients medications reviewed and updated  No  Adelina manages her own meds and she is sleeping. Her daughter will call back or have her call if any questions    Current Symptoms  --  Dizziness          TCM Call       Post hospital issues  None    Should patient be enrolled in anticoag monitoring?  No    Scheduled for follow up?  Yes    Did you obtain your prescribed medications  Yes    Do you need help managing your prescriptions or medications  No    Is transportation to your appointment needed  No    I have advised the patient to call PCP with any new or worsening symptoms  Nicolas Erwin    Living Arrangements  Alone    Support System  Family    The type of support provided  Physical; Emotional    Do you have social support  Yes, as much as I need    Are you recieving any outpatient services  Yes    What type of services  PT    Are you recieving home care services  No    Interperter language line needed  No    Counseling  Family    Counseling topics  Activities of daily living; Importance of RX compliance; patient and family education; instructions for management; Risk factor reduction    Comments  I spoke with Adelina's daughter Keesha who states that her mother is currently sleeping. She still has dizziness, but overall improving and she is better when sitting and she is using her walker.She knows to take her to the ER if any chest pain, dypsnea, worsening dizziness, palpitations, etc Nicolas Erwin          As per TCM.  She is doing outpatient PT for balance. She still feels unsteady and is needing the walker.    Sinuses are still congested and she is still coughing up yellow. Using her flonase daily. No improvement of symptoms.   She never took her metformin, cut back on starches and all sugars.  Reviewed all labowrk and studies with patient.       Review of Systems   Constitutional:  Negative for chills and fever.   HENT:  Positive for congestion and nosebleeds. Negative for ear pain and sore  "throat.    Eyes:  Negative for pain and visual disturbance.   Respiratory:  Positive for cough. Negative for shortness of breath.    Cardiovascular:  Negative for chest pain and palpitations.   Gastrointestinal:  Negative for abdominal pain and vomiting.   Genitourinary:  Negative for dysuria and hematuria.   Musculoskeletal:  Negative for arthralgias and back pain.   Skin:  Negative for color change and rash.   Neurological:  Positive for dizziness. Negative for seizures and syncope.   All other systems reviewed and are negative.    Objective     /76   Pulse (!) 48   Temp 97.6 °F (36.4 °C)   Resp 16   Ht 5' 1.5\" (1.562 m)   Wt 76.2 kg (168 lb)   BMI 31.23 kg/m²     Physical Exam  Constitutional:       General: She is not in acute distress.     Appearance: She is well-developed. She is not diaphoretic.   HENT:      Head: Normocephalic and atraumatic.      Right Ear: External ear normal. Tympanic membrane is retracted.      Left Ear: External ear normal. Tympanic membrane is retracted.      Nose: Congestion present.      Mouth/Throat:      Pharynx: Oropharynx is clear.   Eyes:      Pupils: Pupils are equal, round, and reactive to light.   Neck:      Thyroid: No thyromegaly.      Vascular: No JVD.   Cardiovascular:      Rate and Rhythm: Normal rate and regular rhythm.      Heart sounds: No murmur heard.     No friction rub. No gallop.   Pulmonary:      Effort: Pulmonary effort is normal.      Breath sounds: Normal breath sounds. No wheezing, rhonchi or rales.   Abdominal:      General: Bowel sounds are normal. There is no distension.      Palpations: Abdomen is soft.      Tenderness: There is no abdominal tenderness.   Musculoskeletal:         General: Normal range of motion.      Cervical back: Normal range of motion and neck supple.   Lymphadenopathy:      Cervical: No cervical adenopathy.   Skin:     General: Skin is warm and dry.      Findings: No rash.   Neurological:      Mental Status: She is alert " and oriented to person, place, and time.      Cranial Nerves: No cranial nerve deficit.      Sensory: No sensory deficit.      Motor: No abnormal muscle tone.      Coordination: Coordination normal.      Gait: Gait abnormal.      Deep Tendon Reflexes: Reflexes normal.   Psychiatric:         Behavior: Behavior normal.         Thought Content: Thought content normal.         Judgment: Judgment normal.       Medications have been reviewed by provider in current encounter

## 2024-10-17 NOTE — PATIENT INSTRUCTIONS
Please take your flonase 2 puffs to each nostril once a day. Please aim the nozzle towards your ears.    I sent your antibiotic to [facility].  Please let me know if you're not feeling better in a week at most.

## 2024-10-17 NOTE — ASSESSMENT & PLAN NOTE
Well controlled on current medications.  Will continue valsartan, amlodipine, hydrochlorothiazide, and metoprolol as ordered.

## 2024-10-22 ENCOUNTER — OFFICE VISIT (OUTPATIENT)
Facility: CLINIC | Age: 84
End: 2024-10-22
Payer: MEDICARE

## 2024-10-22 DIAGNOSIS — R26.89 IMBALANCE: Primary | ICD-10-CM

## 2024-10-22 DIAGNOSIS — H81.12 BENIGN PAROXYSMAL POSITIONAL VERTIGO OF LEFT EAR: ICD-10-CM

## 2024-10-22 DIAGNOSIS — R42 DIZZINESS: ICD-10-CM

## 2024-10-22 PROCEDURE — 97530 THERAPEUTIC ACTIVITIES: CPT | Performed by: PHYSICAL THERAPIST

## 2024-10-22 PROCEDURE — 97112 NEUROMUSCULAR REEDUCATION: CPT | Performed by: PHYSICAL THERAPIST

## 2024-10-22 NOTE — PROGRESS NOTES
Daily Note     Today's date: 10/22/2024  Patient name: Adelina Dyer  : 1940  MRN: [id number]  Referring provider: Narciso Ford CR*  Dx:   Encounter Diagnosis     ICD-10-CM    1. Imbalance  R26.89       2. Dizziness  R42       3. Benign paroxysmal positional vertigo of left ear  H81.12           Subjective: Patient states that she is feeling better and has not had any dizziness.       Objective: See treatment diary below    NMR: 2x  - L Roll test: (-) nystagmus/dizziness  - R Roll test: (-) nystagmus/dizziness    TA:  - mCTSIB  - FGA      Assessment: Patient tolerated treatment well. Continued with positional testing, displaying (-) nystagmus and dizziness with B/L roll testing. Due to patient reports of imbalance, mCTSIB and FGA completed today scoring below normative values suggesting she is at HIGH risk of falls. Plan to add static and dynamic balance exercises next session. Patient will continue to benefit from skilled PT services.         Plan: Continue per plan of care.         Outcome Measures 10/22/24        mCTSIB  - FTEO (firm)  - FTEC (firm)  - FTEO (foam)  - FTEC (foam)   30 sec  30 sec  30 sec  5 sec        DGI defer/24        FGA         DHI defer/100        JPET defer degrees

## 2024-10-24 ENCOUNTER — APPOINTMENT (OUTPATIENT)
Facility: CLINIC | Age: 84
End: 2024-10-24
Payer: MEDICARE

## 2024-10-28 ENCOUNTER — OFFICE VISIT (OUTPATIENT)
Facility: CLINIC | Age: 84
End: 2024-10-28
Payer: MEDICARE

## 2024-10-28 DIAGNOSIS — R42 DIZZINESS: ICD-10-CM

## 2024-10-28 DIAGNOSIS — H81.12 BENIGN PAROXYSMAL POSITIONAL VERTIGO OF LEFT EAR: ICD-10-CM

## 2024-10-28 DIAGNOSIS — R26.89 IMBALANCE: Primary | ICD-10-CM

## 2024-10-28 PROCEDURE — 97530 THERAPEUTIC ACTIVITIES: CPT

## 2024-10-28 NOTE — PROGRESS NOTES
Daily Note     Today's date: 10/28/2024  Patient name: Adelina Dyer  : 1940  MRN: [id number]  Referring provider: Narciso Ford CR*  Dx:   Encounter Diagnosis     ICD-10-CM    1. Imbalance  R26.89       2. Dizziness  R42       3. Benign paroxysmal positional vertigo of left ear  H81.12           Subjective: Patient reports she is feeling busy again, started today. Reports it is not too bad, but does feel the same as before. Notes she feels dizzy when she stands/gets moving, later describes as feeling like she is rocking on a boat.       Objective: See treatment diary below    TA:  - L Loaded Jana-Hallpike: negative  - R Loaded Jana-Hallpike: negative  - L Roll test: negative  - R Roll test: negative    Orthostatic screen  - Supine BP: 110/64 mmHg   - Seated BP: 120/70 mmHg   - Standing BP: 116/64 mmHg     Oculomotor screen  - Nystagmus room light: normal  - Gaze holding nystagmus: normal   - Smooth pursuits: normal  - Saccades: undershooting slightly  - Near point convergence: decreased L eye convergence, normal distance   - VOR cancellation: normal, 2/10 dizziness     - VOR: intact   - Head thrust test: inconsistent slipping  - Head shaking nystagmus test: no dizziness     DVA   - Static: 20/15   - Dynamic: 20/100  - Number of lines difference: 7     NMR (gait belt):   - Side stepping on foam beam: 5 feet, 4 cycles   - Step up to foam beam: 8 reps     Patient education: discussed seeking higher level of care with any abnormal/adverse symptoms; patient verbalized understanding.     Assessment: Patient tolerated treatment well. 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 Educated patient on findings and plan to address vestibular-specific exercises into upcoming balance PoC to address. Patient demonstrated instability with activities on foam  surface with occasional need for stepping strategy to stabilize. No dizziness end of session. Patient will continue to benefit from skilled PT services to decrease dizziness and improve balance.         Plan: Continue per plan of care.         Outcome Measures 10/22/24        mCTSIB  - FTEO (firm)  - FTEC (firm)  - FTEO (foam)  - FTEC (foam)   30 sec  30 sec  30 sec  5 sec        DGI defer/24        FGA 11/30        DHI defer/100        JPET defer degrees

## 2024-10-30 ENCOUNTER — OFFICE VISIT (OUTPATIENT)
Facility: CLINIC | Age: 84
End: 2024-10-30
Payer: MEDICARE

## 2024-10-30 DIAGNOSIS — R42 DIZZINESS: ICD-10-CM

## 2024-10-30 DIAGNOSIS — R26.89 IMBALANCE: Primary | ICD-10-CM

## 2024-10-30 DIAGNOSIS — H81.12 BENIGN PAROXYSMAL POSITIONAL VERTIGO OF LEFT EAR: ICD-10-CM

## 2024-10-30 PROCEDURE — 97530 THERAPEUTIC ACTIVITIES: CPT

## 2024-10-30 NOTE — PROGRESS NOTES
Daily Note     Today's date: 10/30/2024  Patient name: Adelina Dyer  : 1940  MRN: [id number]  Referring provider: Narciso Ford CR*  Dx:   Encounter Diagnosis     ICD-10-CM    1. Imbalance  R26.89       2. Dizziness  R42       3. Benign paroxysmal positional vertigo of left ear  H81.12           Subjective: No reports of dizziness. Reports she feels unsteady when walking without an AD. No falls.     Objective: See treatment diary below    NMR:   STS without UE support x 10  Walking with HT 50 feet x 4  Walking with HN 50 feet x 4  180 degree rotational turns to blaze pods (4 pods around patient) x 2 minutes  Removing squiz off the wall and 180 degree turn on foam pad to place it on bucket   Standing trunk rotation with 5.5# TT with eyes closed x 90 seconds       Assessment: Patient tolerated treatment well with an focus on habituation with balance activities. Patient with no reports of dizziness throughout PT session despite head turns and rotational turns. Patient with complaints of unsteadiness instead especially on non-complaint surfaces and quick turns. Pt did 1-2 minor LOB which required assistance to recover LOB with modified single leg stance likely due to weak stance control and poor reactive balance strategies. Continue with PLOC to improve reduce fall risk and improve confidence in balance.         Plan: Continue per plan of care.         Outcome Measures 10/22/24        mCTSIB  - FTEO (firm)  - FTEC (firm)  - FTEO (foam)  - FTEC (foam)   30 sec  30 sec  30 sec  5 sec        DGI defer/24        FGA         DHI defer/100        JPET defer degrees

## 2024-10-31 ENCOUNTER — APPOINTMENT (OUTPATIENT)
Facility: CLINIC | Age: 84
End: 2024-10-31
Payer: MEDICARE

## 2024-10-31 ENCOUNTER — TELEPHONE (OUTPATIENT)
Age: 84
End: 2024-10-31

## 2024-10-31 DIAGNOSIS — R05.9 COUGH, UNSPECIFIED TYPE: Primary | ICD-10-CM

## 2024-10-31 DIAGNOSIS — E03.9 HYPOTHYROIDISM, UNSPECIFIED TYPE: ICD-10-CM

## 2024-10-31 RX ORDER — FLUTICASONE PROPIONATE 50 MCG
2 SPRAY, SUSPENSION (ML) NASAL DAILY
Qty: 18 ML | Refills: 3 | Status: SHIPPED | OUTPATIENT
Start: 2024-10-31

## 2024-10-31 RX ORDER — LEVOTHYROXINE SODIUM 125 UG/1
125 TABLET ORAL DAILY
Qty: 90 TABLET | Refills: 3 | Status: SHIPPED | OUTPATIENT
Start: 2024-10-31 | End: 2025-10-26

## 2024-10-31 NOTE — TELEPHONE ENCOUNTER
Pt was seen on 10/17. She said she still has the cough and congestion. It is not as bad but still annoying. She also has post nasal drip. She finished her abx on Sun and wonders what can be done or what she can take OTC.     Pt also would like a refill on her levothyroxine but it was prescribed at the hospital . Can Dr. Wong send to [facility] in [place]. Please, advise.

## 2024-10-31 NOTE — TELEPHONE ENCOUNTER
I sent in her levothyroxine, as well as a nasal spray which should help the nasal drip and cough. She would need to take this every day for it to help, and it will take a few days at least.  If she is not getting better she should come into the office for a recheck.

## 2024-11-04 ENCOUNTER — OFFICE VISIT (OUTPATIENT)
Facility: CLINIC | Age: 84
End: 2024-11-04
Payer: MEDICARE

## 2024-11-04 DIAGNOSIS — R26.89 IMBALANCE: Primary | ICD-10-CM

## 2024-11-04 DIAGNOSIS — H81.12 BENIGN PAROXYSMAL POSITIONAL VERTIGO OF LEFT EAR: ICD-10-CM

## 2024-11-04 DIAGNOSIS — R42 DIZZINESS: ICD-10-CM

## 2024-11-04 PROCEDURE — 97112 NEUROMUSCULAR REEDUCATION: CPT

## 2024-11-04 NOTE — PROGRESS NOTES
Daily Note     Today's date: 2024  Patient name: Adelina Dyer  : 1940  MRN: [id number]  Referring provider: Narciso Ford CR*  Dx:   Encounter Diagnosis     ICD-10-CM    1. Imbalance  R26.89       2. Dizziness  R42       3. Benign paroxysmal positional vertigo of left ear  H81.12           Subjective: Patient reports that she is feeling off balance.     Objective: See treatment diary below:    BP: 121/70 mmHg   HR: 52 bpm   SpO2: 99%    NMR:   - STS without UE support x 14  - Walking with HT 50 feet x 4  - Walking with HN 50 feet x 4  - Walking with R/L whole body turns on command: 50 ft x 4 laps   - Standing trunk rotation with 5.5# TT with eyes closed x 90 seconds   - Standing up and down on medium RR: 20 x  - Step taps to medium RR: 20x   - Fwd 9in royal negotiation: 5 laps   - Lat 9in royal negotiation: 5 laps     Assessment: Patient tolerated treatment well with a focus on habituation with balance activities. Patient illustrated 2-3 LOB with H and V head turns/nods, which required CGA/MinAx1 to remain balanced. However, able to utilize stepping strategy to maintain her balance. Initially started with 6 in royal negotiation, however patient illustrated ease in doing so, therefore challenged with 9in hurdles. She illustrated M/L instability with this likely due to mild deficits in SLS balance. Continue with PLOC to improve reduce fall risk and improve confidence in balance.         Plan: Continue per plan of care.         Outcome Measures 10/22/24        mCTSIB  - FTEO (firm)  - FTEC (firm)  - FTEO (foam)  - FTEC (foam)   30 sec  30 sec  30 sec  5 sec        DGI defer/24        FGA         DHI defer/100        JPET defer degrees

## 2024-11-06 ENCOUNTER — OFFICE VISIT (OUTPATIENT)
Facility: CLINIC | Age: 84
End: 2024-11-06
Payer: MEDICARE

## 2024-11-06 DIAGNOSIS — R26.89 IMBALANCE: Primary | ICD-10-CM

## 2024-11-06 DIAGNOSIS — R42 DIZZINESS: ICD-10-CM

## 2024-11-06 DIAGNOSIS — H81.12 BENIGN PAROXYSMAL POSITIONAL VERTIGO OF LEFT EAR: ICD-10-CM

## 2024-11-06 PROCEDURE — 97112 NEUROMUSCULAR REEDUCATION: CPT

## 2024-11-06 NOTE — PROGRESS NOTES
Daily Note     Today's date: 2024  Patient name: Adelina Dyer  : 1940  MRN: [id number]  Referring provider: Narciso Ford CR*  Dx:   Encounter Diagnosis     ICD-10-CM    1. Imbalance  R26.89       2. Dizziness  R42       3. Benign paroxysmal positional vertigo of left ear  H81.12           Subjective: Patient reports that she is tired today.     Objective: See treatment diary below:    BP: 101/67 mmHg   HR: 65 bpm   SpO2: 98%    NMR:   - STS without UE support x 13  - Standing on foam pad + stepping onto med RR: 15x, 15 x  - In and out ladder with 5.5# TT: 4 laps (small ladder)   - High knee marching with 5.5# TT: 20 ft  - Standing on balance pods with H and V head turns: 3 min   - Posterior resisted ambulation yellow band: 300 ft   - VOR x 1 H and V: 1 min, self pace; asymptomatic     Assessment: Patient tolerated treatment well with a focus on habituation with balance activities. Patient initially demonstrated difficulty with ladder drill, likely due to difficulty with coordination and sequencing, however this improved with repeated reps, suggesting good motor learning and carryover. Retropulsion noted with standing on balance pods which may be due to decreased proprioceptive input. This improved when given verbal cueing to increase weight on toes. No dizziness with VOR x1, illustrating improvement in vestibular hypofunction. Continue with PLOC to improve reduce fall risk and improve confidence in balance.         Plan: Continue per plan of care.         Outcome Measures 10/22/24        mCTSIB  - FTEO (firm)  - FTEC (firm)  - FTEO (foam)  - FTEC (foam)   30 sec  30 sec  30 sec  5 sec        DGI defer/24        FGA         DHI defer/100        JPET defer degrees

## 2024-11-11 ENCOUNTER — EVALUATION (OUTPATIENT)
Facility: CLINIC | Age: 84
End: 2024-11-11
Payer: MEDICARE

## 2024-11-11 ENCOUNTER — OFFICE VISIT (OUTPATIENT)
Dept: FAMILY MEDICINE CLINIC | Facility: CLINIC | Age: 84
End: 2024-11-11
Payer: MEDICARE

## 2024-11-11 VITALS
HEART RATE: 76 BPM | BODY MASS INDEX: 30.51 KG/M2 | SYSTOLIC BLOOD PRESSURE: 126 MMHG | RESPIRATION RATE: 18 BRPM | TEMPERATURE: 98.2 F | WEIGHT: 165.8 LBS | HEIGHT: 62 IN | DIASTOLIC BLOOD PRESSURE: 60 MMHG

## 2024-11-11 DIAGNOSIS — R42 DIZZINESS: ICD-10-CM

## 2024-11-11 DIAGNOSIS — E03.9 HYPOTHYROIDISM, UNSPECIFIED TYPE: ICD-10-CM

## 2024-11-11 DIAGNOSIS — J31.0 CHRONIC RHINITIS: ICD-10-CM

## 2024-11-11 DIAGNOSIS — Z00.00 MEDICARE ANNUAL WELLNESS VISIT, SUBSEQUENT: Primary | ICD-10-CM

## 2024-11-11 DIAGNOSIS — R26.89 IMBALANCE: Primary | ICD-10-CM

## 2024-11-11 DIAGNOSIS — H81.12 BENIGN PAROXYSMAL POSITIONAL VERTIGO OF LEFT EAR: ICD-10-CM

## 2024-11-11 PROCEDURE — 97530 THERAPEUTIC ACTIVITIES: CPT

## 2024-11-11 PROCEDURE — G0439 PPPS, SUBSEQ VISIT: HCPCS | Performed by: INTERNAL MEDICINE

## 2024-11-11 NOTE — PROGRESS NOTES
PT Evaluation          POC expires Unit limit Auth Expiration date PT/OT + Visit Limit? Co-Insurance   2024   BOMN Yes                               Visit/Unit Tracking  AUTH Status:  Date               None  Used                Remaining                      Today's date: 2024  Patient name: Adelina Dyer  : 1940  MRN: [id number]  Referring provider: Narciso Ford CR*  Dx:   Encounter Diagnosis     ICD-10-CM    1. Imbalance  R26.89       2. Dizziness  R42       3. Benign paroxysmal positional vertigo of left ear  H81.12           Assessment  Assessment details: Patient is a 83 y.o. Female who presents to skilled outpatient PT with symptoms of dizziness that has now improved. Cervical spine integrity intact per normal and negative results of mVBI, Sharp Abelardo, and Alar Stability Tests respectively. BPPV negative for previous L horizontal canalithiasis. Since that has resolved, she has been participating in balance therapy. Since her last testing, patient has shown progress in the following outcome measures: 4th condition of the mCTSIB and FGA, suggesting decreased visual reliance for balance and improvement in dynamic balance. Tested for 6 MWT, 5 x STS,10 MWT and TUG as this was not previously tested due to time constraints. 10 MWT, 6 MWT, 5 x STS and TUG revealed decreased age normative values, suggesting decreased gait speed, CV endurance, functional LE strength and functional mobility, respectively. She has met 4 goals at this time and continues to work towards remaining goals. Updated goals to reflect POC for balance. She will benefit from skilled outpatient PT in order to improve her balance.      Patient verbalized understanding of POC.    Please contact me if you have any questions or recommendations. Thank you for the referral and the opportunity to share in Adelina Dyer's care.      Cut off score   All  date taken from [facility] Neuro Section or Rehab Measures    DGI:  MDC for Vestibular Disorders: 4 points  MDC for Geriatrics/Community Dwelling Older Adults: 3 Points  Falls risk cut off: <19/24    FGA:  MCID: 4 points  Geriatrics/Community Dwelling Older Adults: </= 22/30 fall risk  Geriatrics/Community Dwelling Older Adults: </= 20/30 unexplained falls in the next 6 months  Parkinsons: </= 18/30 fall risk    mCTSIB (normed on ages 20-60, lower number is less sway or better static balance)  Eyes open firm surface (norm 0.21-0.48)  Eyes closed firm surface (norm 0.48-0.99)  Eyes open foam surface (norm 0.38-0.71)  Eyes closed foam surface (norm 0.70-2.22)    DHI:  0-39: low perception of handicap  40-69: moderate perception of handicap  : severe perception of handicap  > 60: increased risk for falls    Joint Position Error Testing (JPET):  > 4.5 degrees: abnormal joint proprioception        Impairments: Abnormal coordination, abnormal gait, abnormal muscle tone, abnormal or restricted ROM, activity intolerance, impaired balance, impaired physical strength, lacks appropriate HEP, poor posture, poor body mechanics, pain with function, safety issue, abnormal movement  Understanding of Dx/Px/POC: good  Prognosis: excellent      Goals    BPPV Goals (4 weeks):  - Patient will report complete resolution of symptoms in order to promote return to PLOF- met   - Patient will complete FGA in order to promote return to safe performance of ADLs- met   - Patient will demonstrate (-) Jana-Hallpike test on L side- met   - Patient will demonstrate (-) Roll Test on L side- met     Goals as of 11/11/24  Short Term Goals (4 weeks):    - Patient will improve time on TUG by 2.9 seconds to facilitate improved safety in all ambulation  - Patient will be independent in basic HEP 2-3 weeks  - Patient will improve 5xSTS score by 2.3 seconds to promote improved LE functional strength needed for ADLs  - Patient will complete components of HiMAT  to promote agility necessary for sports related tasks    Long Term Goals (12 weeks):  - Patient will be independent in a comprehensive home exercise program  - Patient will improve scoring on DGI by 2.6 points to progress safety  - Patient will improve gait speed by 0.18 m/s to improve safety with community ambulation  - Patient will improve TOBAR by 6 points in order to improve static balance and reduce risk for falls  - Patient will improve scoring on FGA by 4 points to progress safety with dynamic tasks  - Patient will be able to demonstrate HT in gait without veering  - Patient will improve 6 Minute Walk Test score by 190 feet to promote improved cardiovascular endurance  - Patient will report 50% reduction in near falls in order to improve safety with functional tasks and reduce his risk for falls  - Patient will report going on walks at least 3 days per week to promote independence and improved cardiovascular endurance  - Patient will be able to ascend/descend stairs reciprocally with 1 UE assist to promote independence and safety with ADLs  - Patient will report 50% reduction in near falls when ambulating on uneven terrain      Cut off score    All date taken from [facility] Neuro Section or Rehab Measures      Tobar/56  MDC: 6 pts  Age Norms:  60-69: M - 55   F - 55  70-79: M - 54   F - 53  80-89: M - 53   F - 50 5xSTS: Roberto et al 2010  MDC: 2.3 sec  Age Norms:  60-69: 11.4 sec  70-79: 12.6 sec  80-89: 14.8 sec   TUG  MDC: 4.14 sec  Cut off score:  >13.5 sec community dwelling adults  >32.2 frail elderly  <20 I for basic transfers  >30 dependent on transfers 10 Meter Walk Test: Douglas et al 2011  MDC: 0.18 m/s  20-29: M - 1.35 m   F - 1.34 m  30-39: M - 1.43 m   F - 1.34 m  40-49: M - 1.43 m   F - 1.39 m  50-59: M - 1.43 m   F - 1.31 m  60-69: M - 1.34 m   F - 1.24 m  70-79: M - 1.26 m   F - 1.13 m  80-89: M - 0.97 m   F - 0.94 m    Household Ambulator < 0.4 m/s  Limited Community Ambulator 0.4 -  0.8 m/s  Community Ambulator 0.8 - 1.2 m/s  Safely cross the street > 1.2 m/s   FGA  MCID: 4 pts  Geriatrics/community < 22/30 fall risk  Geriatrics/community < 20/30 unexplained falls    DGI  MDC: vestibular - 4 pts  MDC: geriatric/community - 3 pts  Falls risk <19/24 mCTSIB  Norm: 20-60 yrs  Eyes open firm: norm sway 0.21-0.48  Eyes closed firm: norm sway 0.48-0.99  Eyes open foam: norm sway 0.38-0.71  Eyes closed foam: norm sway 0.70-2.22   6 Minute Walk Test  MDC: 190.98 ft  MCID: 164 ft    Age Norms  60-69: M - 1876 ft (571.80 m)  F - 1765 ft (537.98 m)  70-79: M - 1729 ft (527.00 m)  F - 1545 ft (470.92 m)  80-89: M - 1368 ft (416.97 m)  F - 1286 ft (391.97 m) ABC: Chevy & Chyna, 2003  <67% increased risk for falls   Winston-Niranjan Monofilaments  Evaluator Size:        Force (grams):          Hand/Dorsal Thresholds:        Plantar Thresholds:  - 1.65                       - 0.008                       - Normal                                 - Normal  - 2.36                       - 0.02                         - Normal                                 - Normal  - 2.44                       - 0.04                         - Normal                                 - Normal  - 2.83                       - 0.07                         - Normal                                 - Normal  - 3.22                       - 0.16                         - Diminished light touch          - Normal  - 3.61                       - 0.40                         - Diminished light touch          - Normal  - 3.84                       - 0.60                         - Diminished protective           - Diminished light touch  - 4.08                       - 1.00                         - Diminished protective           - Diminished light touch  - 4.17                       - 1.40                         - Diminished protective           - Diminished light touch  - 4.31                       - 2.00                         -  Diminished protective           - Diminished light touch  - 4.56                       - 4.00                         - Loss of protective sense      - Diminished protective  - 4.74                       - 6.00                         - Loss of protective sense      - Diminished protective  - 4.93                       - 8.00                         - Loss of protective sense      - Diminished protective  - 5.07                       - 10.0                         - Loss of protective sense     - Loss of protective sense  - 5.18                       - 15.0                         - Loss of protective sense     - Loss of protective sense  - 5.46                       - 26.0                         - Loss of protective sense     - Loss of protective sense  - 5.88                       - 60.0                         - Loss of protective sense     - Loss of protective sense  - 6.10                       - 100                          - Loss of protective sense     - Loss of protective sense  - 6.45                       - 180                          - Loss of protective sense     - Loss of protective sense  - 6.65                       - 300                          - Deep pressure sense only  - Deep pressure sense only       Plan  Plan details: CRM, balance   Patient would benefit from: Skilled PT  Planned modality interventions: Biofeedback, Cryotherapy, TENS, Thermotherapy  Planned therapy interventions: Abdominal trunk stabilization, ADL training, balance, balance/WB training, breathing training, body mechanics training, coordination, flexibility, functional ROM exercises, gait training, HEP, manual therapy, Lee Taping, motor coordination training, neuromuscular re-education, patient education, postural training, strengthening, stretching, therapeutic activities, therapeutic exercises, work re-integration  Frequency: 2x/wk  Duration in weeks: 8 weeks  Plan of Care beginning date: 11/11/2024  Plan of Care  "expiration date: 8 weeks - 1/6/25   Treatment plan discussed with: patient         Subjective Evaluation    History of Present Illness  Mechanism of injury: Patient presents to physical therapy with reports of dizziness that began last Friday (Oct 4th, 2024). Went to the hospital which revealed she had acute pansinusitiswhich may be contributing to her symptoms. She reports that she cannot describe her dizziness. Initially, patient states that the dizziness was constant, however now it is not as frequent. She takes meclizine every day due to intensity of symptoms.     Updated 11/11/24: Patient reports that dizziness has resolved. In addition, she reports that her balance has improved significantly as she has not been utilizing her walker in her house.     Patient goal: be asymptomatic       Dizziness Subjective  How long does dizziness last: initially, constant. Currently, only getting up   How would you describe the dizziness: \"I'm unsure\"  Rolling in bed: No  Supine to/from sit: No  Recent hearing loss: No  Tinnitus: No  Aural fullness/ear pain: No  Vision changes: Yes  History of recent viral infections: Yes, Orthostatics negative - CT of head negative for pathologic etiology, however, read as \"acute pansinusitis\"   History of migraines: No    Red Flag Screen  - Numbness: Yes, \"in my legs at night sometimes\"  - Tingling: Yes, \"in my legs at night sometimes\"  - Weakness: Yes, \"in my legs at night sometimes\"  - Unilateral hearing loss: No  - Slurred speech: No  - Progressive hearing loss: No  - Tremors: No  - Poor coordination: No  - UMN signs: No  - LoC: No  - Rigidity: No  - Visual field loss: No  - Memory loss: No  - CN dysfunction: No  - Vertical nystagmus: No    Pain  No pain reported     Social Support  Steps to enter house: 2  Stairs in house: 1   Lives in: house   Lives with: alone     Employment status: retired   Hand dominance: R    Treatments  Previous treatment: none  Current treatment: none " "  Diagnostic Testing:   Abnormal CT scan  CT imaging with radiologist read of \"acute pansinusitis\"  Patient currently without sinus symptoms - remains afebrile without leukocytosis  Given a dose of IV Unasyn in the ED -> observe off further antibiotics for now, as even if true infection, more likely viral in nature  Procalcitonin normal       Objective     Vestibular Objective  Cervical Spine AROM:  - Flexion: minimal limitation no pain  - Extension: moderate limitation no pain  - R Rotation: moderate limitation pain at end range  - L Rotation: moderate limitation pain at end range  - R Lateral Flexion: severe limitation no pain  - L Lateral Flexion: severe limitation no pain    Integrity Testing  - mVBI: intact   - Sharp Abelardo: intact  - Alar Stability Test: intact       Coordination Screen  - Dysmetria: intact  - Dysdiadochokinesia: intact     BPPV Screen  - L Jana-Hallpike: neg  - R [place]-Hallpike: neg  - L Horizontal Roll: 30 sec of geotropic nystagmus   - R Horizontal Roll: neg    Treatment:   MARISA Newell x2         Outcome Measures 10/22/24 11/11/24       mCTSIB  - FTEO (firm)  - FTEC (firm)  - FTEO (foam)  - FTEC (foam)   30 sec  30 sec  30 sec  5 sec 30 sec  30 sec  30 sec +   30 sec ++       FGA 11/30 15/30       6 MWT  700 ft with walker        10 MWT  0.78 m/s with walker        5 x STS   15.03 sec, pushing off thighs        TUG  13.94 sec with walker                     Precautions: HT  Past Medical History:   Diagnosis Date    Coronary artery disease     Hyperlipidemia     Hypertension     Hypothyroidism     MI, old     UTI (urinary tract infection) 07/26/2022                         "

## 2024-11-11 NOTE — PATIENT INSTRUCTIONS
Medicare Preventive Visit Patient Instructions  Thank you for completing your Welcome to Medicare Visit or Medicare Annual Wellness Visit today. Your next wellness visit will be due in one year (11/12/2025).  The screening/preventive services that you may require over the next 5-10 years are detailed below. Some tests may not apply to you based off risk factors and/or age. Screening tests ordered at today's visit but not completed yet may show as past due. Also, please note that scanned in results may not display below.  Preventive Screenings:  Service Recommendations Previous Testing/Comments   Colorectal Cancer Screening  * Colonoscopy    * Fecal Occult Blood Test (FOBT)/Fecal Immunochemical Test (FIT)  * Fecal DNA/Cologuard Test  * Flexible Sigmoidoscopy Age: 45-75 years old   Colonoscopy: every 10 years (may be performed more frequently if at higher risk)  OR  FOBT/FIT: every 1 year  OR  Cologuard: every 3 years  OR  Sigmoidoscopy: every 5 years  Screening may be recommended earlier than age 45 if at higher risk for colorectal cancer. Also, an individualized decision between you and your healthcare provider will decide whether screening between the ages of 76-85 would be appropriate. Colonoscopy: 06/14/2024  FOBT/FIT: Not on file  Cologuard: Not on file  Sigmoidoscopy: Not on file          Breast Cancer Screening Age: 40+ years old  Frequency: every 1-2 years  Not required if history of left and right mastectomy Mammogram: 07/29/2022        Cervical Cancer Screening Between the ages of 21-29, pap smear recommended once every 3 years.   Between the ages of 30-65, can perform pap smear with HPV co-testing every 5 years.   Recommendations may differ for women with a history of total hysterectomy, cervical cancer, or abnormal pap smears in past. Pap Smear: Not on file        Hepatitis C Screening Once for adults born between 1945 and 1965  More frequently in patients at high risk for Hepatitis C Hep C Antibody: Not  on file        Diabetes Screening 1-2 times per year if you're at risk for diabetes or have pre-diabetes Fasting glucose: 110 mg/dL (5/17/2024)  A1C: 6.2 % (10/4/2024)      Cholesterol Screening Once every 5 years if you don't have a lipid disorder. May order more often based on risk factors. Lipid panel: 05/17/2024          Other Preventive Screenings Covered by Medicare:  Abdominal Aortic Aneurysm (AAA) Screening: covered once if your at risk. You're considered to be at risk if you have a family history of AAA.  Lung Cancer Screening: covers low dose CT scan once per year if you meet all of the following conditions: (1) Age 55-77; (2) No signs or symptoms of lung cancer; (3) Current smoker or have quit smoking within the last 15 years; (4) You have a tobacco smoking history of at least 20 pack years (packs per day multiplied by number of years you smoked); (5) You get a written order from a healthcare provider.  Glaucoma Screening: covered annually if you're considered high risk: (1) You have diabetes OR (2) Family history of glaucoma OR (3)  aged 50 and older OR (4)  American aged 65 and older  Osteoporosis Screening: covered every 2 years if you meet one of the following conditions: (1) You're estrogen deficient and at risk for osteoporosis based off medical history and other findings; (2) Have a vertebral abnormality; (3) On glucocorticoid therapy for more than 3 months; (4) Have primary hyperparathyroidism; (5) On osteoporosis medications and need to assess response to drug therapy.   Last bone density test (DXA Scan): 03/18/2021.  HIV Screening: covered annually if you're between the age of 15-65. Also covered annually if you are younger than 15 and older than 65 with risk factors for HIV infection. For pregnant patients, it is covered up to 3 times per pregnancy.    Immunizations:  Immunization Recommendations   Influenza Vaccine Annual influenza vaccination during flu season is  recommended for all persons aged >= 6 months who do not have contraindications   Pneumococcal Vaccine   * Pneumococcal conjugate vaccine = PCV13 (Prevnar 13), PCV15 (Vaxneuvance), PCV20 (Prevnar 20)  * Pneumococcal polysaccharide vaccine = PPSV23 (Pneumovax) Adults 19-65 yo with certain risk factors or if 65+ yo  If never received any pneumonia vaccine: recommend Prevnar 20 (PCV20)  Give PCV20 if previously received 1 dose of PCV13 or PPSV23   Hepatitis B Vaccine 3 dose series if at intermediate or high risk (ex: diabetes, end stage renal disease, liver disease)   Respiratory syncytial virus (RSV) Vaccine - COVERED BY MEDICARE PART D  * RSVPreF3 (Arexvy) CDC recommends that adults 60 years of age and older may receive a single dose of RSV vaccine using shared clinical decision-making (SCDM)   Tetanus (Td) Vaccine - COST NOT COVERED BY MEDICARE PART B Following completion of primary series, a booster dose should be given every 10 years to maintain immunity against tetanus. Td may also be given as tetanus wound prophylaxis.   Tdap Vaccine - COST NOT COVERED BY MEDICARE PART B Recommended at least once for all adults. For pregnant patients, recommended with each pregnancy.   Shingles Vaccine (Shingrix) - COST NOT COVERED BY MEDICARE PART B  2 shot series recommended in those 19 years and older who have or will have weakened immune systems or those 50 years and older     Health Maintenance Due:  There are no preventive care reminders to display for this patient.  Immunizations Due:      Topic Date Due   • Pneumococcal Vaccine: 65+ Years (1 of 2 - PCV) Never done   • Influenza Vaccine (1) 09/01/2024   • COVID-19 Vaccine (3 - 2023-24 season) 09/01/2024     Advance Directives   What are advance directives?  Advance directives are legal documents that state your wishes and plans for medical care. These plans are made ahead of time in case you lose your ability to make decisions for yourself. Advance directives can apply to  any medical decision, such as the treatments you want, and if you want to donate organs.   What are the types of advance directives?  There are many types of advance directives, and each state has rules about how to use them. You may choose a combination of any of the following:  Living will:  This is a written record of the treatment you want. You can also choose which treatments you do not want, which to limit, and which to stop at a certain time. This includes surgery, medicine, IV fluid, and tube feedings.   Durable power of  for healthcare (DPAHC):  This is a written record that states who you want to make healthcare choices for you when you are unable to make them for yourself. This person, called a proxy, is usually a family member or a friend. You may choose more than 1 proxy.  Do not resuscitate (DNR) order:  A DNR order is used in case your heart stops beating or you stop breathing. It is a request not to have certain forms of treatment, such as CPR. A DNR order may be included in other types of advance directives.  Medical directive:  This covers the care that you want if you are in a coma, near death, or unable to make decisions for yourself. You can list the treatments you want for each condition. Treatment may include pain medicine, surgery, blood transfusions, dialysis, IV or tube feedings, and a ventilator (breathing machine).  Values history:  This document has questions about your views, beliefs, and how you feel and think about life. This information can help others choose the care that you would choose.  Why are advance directives important?  An advance directive helps you control your care. Although spoken wishes may be used, it is better to have your wishes written down. Spoken wishes can be misunderstood, or not followed. Treatments may be given even if you do not want them. An advance directive may make it easier for your family to make difficult choices about your care.   Urinary  Incontinence   Urinary incontinence (UI)  is when you lose control of your bladder. UI develops because your bladder cannot store or empty urine properly. The 3 most common types of UI are stress incontinence, urge incontinence, or both.  Medicines:   May be given to help strengthen your bladder control. Report any side effects of medication to your healthcare provider.  Do pelvic muscle exercises often:  Your pelvic muscles help you stop urinating. Squeeze these muscles tight for 5 seconds, then relax for 5 seconds. Gradually work up to squeezing for 10 seconds. Do 3 sets of 15 repetitions a day, or as directed. This will help strengthen your pelvic muscles and improve bladder control.  Train your bladder:  Go to the bathroom at set times, such as every 2 hours, even if you do not feel the urge to go. You can also try to hold your urine when you feel the urge to go. For example, hold your urine for 5 minutes when you feel the urge to go. As that becomes easier, hold your urine for 10 minutes.   Self-care:   Keep a UI record.  Write down how often you leak urine and how much you leak. Make a note of what you were doing when you leaked urine.  Drink liquids as directed. You may need to limit the amount of liquid you drink to help control your urine leakage. Do not drink any liquid right before you go to bed. Limit or do not have drinks that contain caffeine or alcohol.   Prevent constipation.  Eat a variety of high-fiber foods. Good examples are high-fiber cereals, beans, vegetables, and whole-grain breads. Walking is the best way to trigger your intestines to have a bowel movement.  Exercise regularly and maintain a healthy weight.  Weight loss and exercise will decrease pressure on your bladder and help you control your leakage.   Use a catheter as directed  to help empty your bladder. A catheter is a tiny, plastic tube that is put into your bladder to drain your urine.   Go to behavior therapy as directed.   Behavior therapy may be used to help you learn to control your urge to urinate.    Weight Management   Why it is important to manage your weight:  Being overweight increases your risk of health conditions such as heart disease, high blood pressure, type 2 diabetes, and certain types of cancer. It can also increase your risk for osteoarthritis, sleep apnea, and other respiratory problems. Aim for a slow, steady weight loss. Even a small amount of weight loss can lower your risk of health problems.  How to lose weight safely:  A safe and healthy way to lose weight is to eat fewer calories and get regular exercise. You can lose up about 1 pound a week by decreasing the number of calories you eat by 500 calories each day.   Healthy meal plan for weight management:  A healthy meal plan includes a variety of foods, contains fewer calories, and helps you stay healthy. A healthy meal plan includes the following:  Eat whole-grain foods more often.  A healthy meal plan should contain fiber. Fiber is the part of grains, fruits, and vegetables that is not broken down by your body. Whole-grain foods are healthy and provide extra fiber in your diet. Some examples of whole-grain foods are whole-wheat breads and pastas, oatmeal, brown rice, and bulgur.  Eat a variety of vegetables every day.  Include dark, leafy greens such as spinach, kale, chacho greens, and mustard greens. Eat yellow and orange vegetables such as carrots, sweet potatoes, and winter squash.   Eat a variety of fruits every day.  Choose fresh or canned fruit (canned in its own juice or light syrup) instead of juice. Fruit juice has very little or no fiber.  Eat low-fat dairy foods.  Drink fat-free (skim) milk or 1% milk. Eat fat-free yogurt and low-fat cottage cheese. Try low-fat cheeses such as mozzarella and other reduced-fat cheeses.  Choose meat and other protein foods that are low in fat.  Choose beans or other legumes such as split peas or lentils. Choose  fish, skinless poultry (chicken or turkey), or lean cuts of red meat (beef or pork). Before you cook meat or poultry, cut off any visible fat.   Use less fat and oil.  Try baking foods instead of frying them. Add less fat, such as margarine, sour cream, regular salad dressing and mayonnaise to foods. Eat fewer high-fat foods. Some examples of high-fat foods include french fries, doughnuts, ice cream, and cakes.  Eat fewer sweets.  Limit foods and drinks that are high in sugar. This includes candy, cookies, regular soda, and sweetened drinks.  Exercise:  Exercise at least 30 minutes per day on most days of the week. Some examples of exercise include walking, biking, dancing, and swimming. You can also fit in more physical activity by taking the stairs instead of the elevator or parking farther away from stores. Ask your healthcare provider about the best exercise plan for you.      © Copyright [employer] 2018 Information is for End User's use only and may not be sold, redistributed or otherwise used for commercial purposes. All illustrations and images included in CareNotes® are the copyrighted property of [employer] Inc[employer] or [employer]

## 2024-11-11 NOTE — PROGRESS NOTES
Ambulatory Visit  Name: Adelina Dyer      : 1940      MRN: [id number]  Encounter Provider: Destinee Wong MD  Encounter Date: 2024   Encounter department: [facility]    Assessment & Plan  Medicare annual wellness visit, subsequent         Hypothyroidism, unspecified type         Chronic rhinitis    Orders:    Ambulatory Referral to Otolaryngology; Future       Preventive health issues were discussed with patient, and age appropriate screening tests were ordered as noted in patient's After Visit Summary. Personalized health advice and appropriate referrals for health education or preventive services given if needed, as noted in patient's After Visit Summary.    History of Present Illness     Here for AWV.  She continues to have nasal congestion, post nasal drip, and off balance.  Is seeing PT for gait.  She is using flonase daily without relief.        Patient Care Team:  Destinee Wong MD as PCP - General (Internal Medicine)  Harsha Hills MD    Review of Systems   Constitutional:  Negative for chills and fever.   HENT:  Negative for ear pain and sore throat.    Eyes:  Negative for pain and visual disturbance.   Respiratory:  Negative for cough and shortness of breath.    Cardiovascular:  Negative for chest pain and palpitations.   Gastrointestinal:  Negative for abdominal pain and vomiting.   Genitourinary:  Negative for dysuria and hematuria.   Musculoskeletal:  Negative for arthralgias and back pain.   Skin:  Negative for color change and rash.   Neurological:  Negative for seizures and syncope.   All other systems reviewed and are negative.    Medical History Reviewed by provider this encounter:       Annual Wellness Visit Questionnaire   Adelina is here for her Subsequent Wellness visit.     Health Risk Assessment:   Patient rates overall health as good. Patient feels that their physical health rating is same. Patient is satisfied with their life. Eyesight was rated as same.  Hearing was rated as same. Patient feels that their emotional and mental health rating is same. Patients states they are never, rarely angry. Patient states they are sometimes unusually tired/fatigued. Pain experienced in the last 7 days has been none. Patient states that she has experienced weight loss or gain in last 6 months.     Fall Risk Screening:   In the past year, patient has experienced: no history of falling in past year      Urinary Incontinence Screening:   Patient has leaked urine accidently in the last six months.     Home Safety:  Patient does not have trouble with stairs inside or outside of their home. Patient has working smoke alarms and has no working carbon monoxide detector. Home safety hazards include: none.     Nutrition:   Current diet is Low Saturated Fat and No Added Salt.     Medications:   Patient is currently taking over-the-counter supplements. OTC medications include: see medication list. Patient is able to manage medications.     Activities of Daily Living (ADLs)/Instrumental Activities of Daily Living (IADLs):   Walk and transfer into and out of bed and chair?: Yes  Dress and groom yourself?: Yes    Bathe or shower yourself?: Yes    Feed yourself? Yes  Do your laundry/housekeeping?: Yes  Manage your money, pay your bills and track your expenses?: Yes  Make your own meals?: Yes    Do your own shopping?: Yes    Previous Hospitalizations:   Any hospitalizations or ED visits within the last 12 months?: Yes    How many hospitalizations have you had in the last year?: 1-2    Advance Care Planning:   Living will: No    Durable POA for healthcare: No    Advanced directive: No      Comments: Declines information.    Cognitive Screening:   Provider or family/friend/caregiver concerned regarding cognition?: No    PREVENTIVE SCREENINGS      Cardiovascular Screening:    General: Screening Not Indicated and History Lipid Disorder      Diabetes Screening:     General: Screening Not Indicated and  History Diabetes      Colorectal Cancer Screening:     General: Screening Not Indicated      Breast Cancer Screening:     General: Screening Not Indicated      Cervical Cancer Screening:    General: Screening Not Indicated      Osteoporosis Screening:    General: Patient Declines      Abdominal Aortic Aneurysm (AAA) Screening:        General: Screening Not Indicated      Lung Cancer Screening:     General: Screening Not Indicated      Hepatitis C Screening:    General: Screening Not Indicated    Screening, Brief Intervention, and Referral to Treatment (SBIRT)    Screening  Typical number of drinks in a day: 0  Typical number of drinks in a week: 0  Interpretation: Low risk drinking behavior.    AUDIT-C Screenin) How often did you have a drink containing alcohol in the past year? never  2) How many drinks did you have on a typical day when you were drinking in the past year? 0  3) How often did you have 6 or more drinks on one occasion in the past year? never    AUDIT-C Score: 0  Interpretation: Score 0-2 (female): Negative screen for alcohol misuse    Single Item Drug Screening:  How often have you used an illegal drug (including marijuana) or a prescription medication for non-medical reasons in the past year? never    Single Item Drug Screen Score: 0  Interpretation: Negative screen for possible drug use disorder    Brief Intervention  Alcohol & drug use screenings were reviewed. No concerns regarding substance use disorder identified.     Other Counseling Topics:   Car/seat belt/driving safety, skin self-exam, sunscreen and calcium and vitamin D intake and regular weightbearing exercise.     Social Determinants of Health     Financial Resource Strain: Low Risk  (10/11/2023)    Overall Financial Resource Strain (CARDIA)     Difficulty of Paying Living Expenses: Not hard at all   Food Insecurity: No Food Insecurity (10/10/2024)    Nursing - Inadequate Food Risk Classification     Worried About Running Out of  "Food in the Last Year: Never true     Ran Out of Food in the Last Year: Never true   Transportation Needs: No Transportation Needs (10/10/2024)    PRAPARE - Transportation     Lack of Transportation (Medical): No     Lack of Transportation (Non-Medical): No   Housing Stability: Unknown (10/10/2024)    Housing Stability Vital Sign     Unable to Pay for Housing in the Last Year: Patient declined     Number of Times Moved in the Last Year: 0     Homeless in the Last Year: No   Utilities: Not At Risk (10/10/2024)    [facility] Utilities     Threatened with loss of utilities: No     No results found.    Objective     /60   Pulse 76   Temp 98.2 °F (36.8 °C)   Resp 18   Ht 5' 1.5\" (1.562 m)   Wt 75.2 kg (165 lb 12.8 oz)   BMI 30.82 kg/m²     Physical Exam  Constitutional:       General: She is not in acute distress.     Appearance: She is well-developed. She is not diaphoretic.   HENT:      Head: Normocephalic and atraumatic.      Right Ear: External ear normal.      Left Ear: External ear normal.      Nose: Nose normal.   Eyes:      Pupils: Pupils are equal, round, and reactive to light.   Neck:      Thyroid: No thyromegaly.      Vascular: No JVD.   Cardiovascular:      Rate and Rhythm: Regular rhythm.      Heart sounds: No murmur heard.     No friction rub. No gallop.   Pulmonary:      Effort: Pulmonary effort is normal.      Breath sounds: Normal breath sounds. No wheezing or rales.   Abdominal:      General: Bowel sounds are normal. There is no distension.      Palpations: Abdomen is soft.      Tenderness: There is no abdominal tenderness.   Musculoskeletal:         General: Normal range of motion.      Cervical back: Normal range of motion and neck supple.   Skin:     General: Skin is warm and dry.      Findings: No rash.   Neurological:      Mental Status: She is alert and oriented to person, place, and time.      Cranial Nerves: No cranial nerve deficit.      Sensory: No sensory deficit.      Motor: No abnormal " muscle tone.      Coordination: Coordination normal.      Deep Tendon Reflexes: Reflexes normal.   Psychiatric:         Behavior: Behavior normal.         Thought Content: Thought content normal.         Judgment: Judgment normal.

## 2024-11-12 ENCOUNTER — TELEPHONE (OUTPATIENT)
Age: 84
End: 2024-11-12

## 2024-11-13 ENCOUNTER — OFFICE VISIT (OUTPATIENT)
Facility: CLINIC | Age: 84
End: 2024-11-13
Payer: MEDICARE

## 2024-11-13 DIAGNOSIS — H81.12 BENIGN PAROXYSMAL POSITIONAL VERTIGO OF LEFT EAR: ICD-10-CM

## 2024-11-13 DIAGNOSIS — R26.89 IMBALANCE: Primary | ICD-10-CM

## 2024-11-13 DIAGNOSIS — R42 DIZZINESS: ICD-10-CM

## 2024-11-13 PROCEDURE — 97140 MANUAL THERAPY 1/> REGIONS: CPT

## 2024-11-13 NOTE — PROGRESS NOTES
Daily Note     Today's date: 2024  Patient name: Adelina Dyer  : 1940  MRN: [id number]  Referring provider: Narciso Ford CR*  Dx:   Encounter Diagnosis     ICD-10-CM    1. Imbalance  R26.89       2. Dizziness  R42       3. Benign paroxysmal positional vertigo of left ear  H81.12           Subjective: Patient reports that she is having sinus pressure/discomfort.     Objective: See treatment diary below:    MA:   - Frontal sinus release   - Ethmoid sinus release  - Maxillary sinus release   - Internal ear fascia release   - Education on sinus releases    NMR:   - STS without UE support x 15, 15     Not today:  - Standing on foam pad + stepping onto med RR: 15x, 15 x  - In and out ladder with 5.5# TT: 4 laps (small ladder)   - High knee marching with 5.5# TT: 20 ft  - Standing on balance pods with H and V head turns: 3 min   - Posterior resisted ambulation yellow band: 300 ft   - VOR x 1 H and V: 1 min, self pace; asymptomatic     Assessment: Patient tolerated treatment well with a focus on sinus releases to assist in continued symptoms/reports of sinus pressure. Patient with increased sinus resistance and redness noted in L obicularis occuli and maxillary sinus, suggesting potential histamine release. Subjectively, patient with improvement in breathing/ sinus pressure and reproduction on phlegm after releases. Performed strengthening and balance exercises. Continue with PLOC to improve reduce fall risk and improve confidence in balance.         Plan: Continue per plan of care.         Outcome Measures 10/22/24        mCTSIB  - FTEO (firm)  - FTEC (firm)  - FTEO (foam)  - FTEC (foam)   30 sec  30 sec  30 sec  5 sec        DGI defer/24        FGA         DHI defer/100        JPET defer degrees

## 2024-11-18 ENCOUNTER — OFFICE VISIT (OUTPATIENT)
Facility: CLINIC | Age: 84
End: 2024-11-18
Payer: MEDICARE

## 2024-11-18 DIAGNOSIS — R42 DIZZINESS: ICD-10-CM

## 2024-11-18 DIAGNOSIS — R26.89 IMBALANCE: Primary | ICD-10-CM

## 2024-11-18 PROCEDURE — 97112 NEUROMUSCULAR REEDUCATION: CPT

## 2024-11-18 NOTE — PROGRESS NOTES
Daily Note     Today's date: 2024  Patient name: Adelina Dyer  : 1940  MRN: [id number]  Referring provider: Narciso Ford CR*  Dx:   Encounter Diagnosis     ICD-10-CM    1. Imbalance  R26.89       2. Dizziness  R42           Subjective: Patient reports that she is having sinus pressure/discomfort.     Objective: See treatment diary below:    MA:  x 10   - Frontal sinus release   - Ethmoid sinus release  - Maxillary sinus release   - Education on sinus releases    NMR:   - STS without UE support x 15, 15   - High knee marching with 5.5# TT: 20 ft x 4   - Lateral stepping from one airex to airex x 2 minutes  - Lateral stepping from one airex to airex with head turns x 2 minutes  - Rotational turns on airex + blaze pods x 2 minutes       Assessment: Patient tolerated PT session well. She had an occasional LOB which pt required physical assistance to recover LOB due to poor reactive balance without UE support. Improved with lateral stepping on airex when cued patient to look upward versus down at her feet. Continue with PLOC to improve reduce fall risk and improve confidence in balance.         Plan: Continue per plan of care.         Outcome Measures 10/22/24        mCTSIB  - FTEO (firm)  - FTEC (firm)  - FTEO (foam)  - FTEC (foam)   30 sec  30 sec  30 sec  5 sec        DGI defer/24        FGA         DHI defer/100        JPET defer degrees

## 2024-11-20 ENCOUNTER — OFFICE VISIT (OUTPATIENT)
Facility: CLINIC | Age: 84
End: 2024-11-20
Payer: MEDICARE

## 2024-11-20 DIAGNOSIS — H81.12 BENIGN PAROXYSMAL POSITIONAL VERTIGO OF LEFT EAR: ICD-10-CM

## 2024-11-20 DIAGNOSIS — R42 DIZZINESS: ICD-10-CM

## 2024-11-20 DIAGNOSIS — R26.89 IMBALANCE: Primary | ICD-10-CM

## 2024-11-20 PROCEDURE — 97112 NEUROMUSCULAR REEDUCATION: CPT

## 2024-11-20 NOTE — PROGRESS NOTES
Daily Note     Today's date: 2024  Patient name: Adelina Dyer  : 1940  MRN: [id number]  Referring provider: Narciso Ford CR*  Dx:   Encounter Diagnosis     ICD-10-CM    1. Imbalance  R26.89       2. Dizziness  R42       3. Benign paroxysmal positional vertigo of left ear  H81.12           Subjective: Patient reports that she is fatigued today.     Objective: See treatment diary below:  BP: 111/61 mmHg     MA: x 10   - Frontal sinus release   - Ethmoid sinus release  - Maxillary sinus release   - Education on sinus releases    NMR:   - STS without UE support x 15, 15   - High knee marching with 5.5# TT: 20 ft x 4   - Lateral stepping from one airex to airex x 2 minutes  - Fwd/bwd stepping onto and off foam pad: 20x   - Ambulation with R/L turnin ft       Assessment: Patient tolerated PT session well. Patient illustrated mild episodes of M/L instability with stepping on and off compliant surfaces likely due to decreased proprioceptive input, however this improved with repeated reps, suggesting good motor learning and carryover. Good ability to look forward vs down at her feet with exercises as compared to previous session. Continued to work on sinus due to reports of improvement with each session. Continue with PLOC to improve reduce fall risk and improve confidence in balance.         Plan: Continue per plan of care.         Outcome Measures 10/22/24        mCTSIB  - FTEO (firm)  - FTEC (firm)  - FTEO (foam)  - FTEC (foam)   30 sec  30 sec  30 sec  5 sec        DGI defer/24        FGA         DHI defer/100        JPET defer degrees

## 2024-11-25 ENCOUNTER — OFFICE VISIT (OUTPATIENT)
Facility: CLINIC | Age: 84
End: 2024-11-25
Payer: MEDICARE

## 2024-11-25 DIAGNOSIS — H81.12 BENIGN PAROXYSMAL POSITIONAL VERTIGO OF LEFT EAR: ICD-10-CM

## 2024-11-25 DIAGNOSIS — R26.89 IMBALANCE: Primary | ICD-10-CM

## 2024-11-25 DIAGNOSIS — R42 DIZZINESS: ICD-10-CM

## 2024-11-25 PROCEDURE — 97112 NEUROMUSCULAR REEDUCATION: CPT

## 2024-11-25 NOTE — PROGRESS NOTES
Daily Note     Today's date: 2024  Patient name: Adelina Dyer  : 1940  MRN: [id number]  Referring provider: Narciso Ford CR*  Dx:   Encounter Diagnosis     ICD-10-CM    1. Imbalance  R26.89       2. Dizziness  R42       3. Benign paroxysmal positional vertigo of left ear  H81.12           Subjective: Patient states that she had a good bday.     Objective: See treatment diary below:  BP: 99/75 mmHg  HR: 55 bpm  SpO2: 97%     NMR:   - STS with with 5.5# TT x 15, 15  - Stair negotiation with 5.5# TT reciprocally: 3 min  - High knee marching with 5.5# TT: 20 ft x 4   - Side stepping with foam beam > 5.5#TT: 5 laps   - Tandem walking on foam beam > 5.5#TT: 5 laps   - Bowling on foam beam with yellow ball: 15x   - Bowling on flat surface with 2# MB: 12x   - Sled push/pull 25#: 80 ft x2      Assessment: Patient tolerated PT session well. Patient most challenged with tandem ambulation on foam beam as noted by frequent use of UE to maintain balanced and M/L instability (R>L). Incorporated bowling with patient as her goal is to return to this; retropulsion noted while standing on complaint surfaces which did improve with repeated reps, suggesting good carryover. Also added sled push/pull to mimic vacuum which was well tolerated. Continue with PLOC to improve reduce fall risk and improve confidence in balance.         Plan: Continue per plan of care.         Outcome Measures 10/22/24        mCTSIB  - FTEO (firm)  - FTEC (firm)  - FTEO (foam)  - FTEC (foam)   30 sec  30 sec  30 sec  5 sec        DGI defer/24        FGA         DHI defer/100        JPET defer degrees

## 2024-11-27 ENCOUNTER — OFFICE VISIT (OUTPATIENT)
Facility: CLINIC | Age: 84
End: 2024-11-27
Payer: MEDICARE

## 2024-11-27 DIAGNOSIS — R26.89 IMBALANCE: Primary | ICD-10-CM

## 2024-11-27 DIAGNOSIS — R42 DIZZINESS: ICD-10-CM

## 2024-11-27 PROCEDURE — 97112 NEUROMUSCULAR REEDUCATION: CPT

## 2024-11-27 NOTE — PROGRESS NOTES
Daily Note     Today's date: 2024  Patient name: Adelina Dyer  : 1940  MRN: [id number]  Referring provider: Narciso Ford CR*  Dx:   Encounter Diagnosis     ICD-10-CM    1. Imbalance  R26.89       2. Dizziness  R42           Subjective: Patient states that she had a good bday.     Objective: See treatment diary below:  BP: 126/66 mmHg  HR: 59 bpm  SpO2: 97%     NMR:   - STS with with 5.5# TT x 15, 15  - Side stepping on foam beam + braiding scarfs x 4 minutes  - Walking across mat with various items under it    - Forward walking x 6 laps   - Forward walking + HN x 6 laps   - Forward walking + HT x 6 laps   - latreal stepping x 6 laps   - Retrowalking x 6 laps  - 1 foot on dynadisc + trunk rotation with 10# TT   - 1 foot on dynadisc + 1 foot on small RR + ball toss with tennis ball, naming an holiday object based on letter.         Assessment: Patient tolerated PT session well. Worked on walking on uneven surface to simulate walking outdoors on grass. Patient did have 1-2 LOB with added head turns indicating decreased dynamic movement and decreased vestibular integration to maintain balance. Continue with PLOC to improve reduce fall risk and improve confidence in balance.         Plan: Continue per plan of care.         Outcome Measures 10/22/24        mCTSIB  - FTEO (firm)  - FTEC (firm)  - FTEO (foam)  - FTEC (foam)   30 sec  30 sec  30 sec  5 sec        DGI defer/24        FGA         DHI defer/        JPET defer degrees

## 2024-12-04 ENCOUNTER — OFFICE VISIT (OUTPATIENT)
Facility: CLINIC | Age: 84
End: 2024-12-04
Payer: MEDICARE

## 2024-12-04 DIAGNOSIS — R42 DIZZINESS: ICD-10-CM

## 2024-12-04 DIAGNOSIS — R26.89 IMBALANCE: Primary | ICD-10-CM

## 2024-12-04 DIAGNOSIS — H81.12 BENIGN PAROXYSMAL POSITIONAL VERTIGO OF LEFT EAR: ICD-10-CM

## 2024-12-04 PROCEDURE — 97112 NEUROMUSCULAR REEDUCATION: CPT

## 2024-12-04 NOTE — PROGRESS NOTES
"Daily Note     Today's date: 2024  Patient name: Adelina Dyer  : 1940  MRN: [id number]  Referring provider: Narciso Ford CR*  Dx:   Encounter Diagnosis     ICD-10-CM    1. Imbalance  R26.89       2. Dizziness  R42       3. Benign paroxysmal positional vertigo of left ear  H81.12           Subjective: Patient reports that she \"feels her age today.\"    Objective: See treatment diary below:  BP: 134/69 mmHg  HR: 54 bpm  SpO2: 97%     NMR:   - STS with with 5.5# TT x 15, 15  - Stepping up and down step with red resistance > holding 5.5# TT: 15x, x 15, CGA/MinAx1  - Lateral stepping on step with red resistance > holding 5.5#TT: 10x each side, CGA/MinAx1  - Side stepping on foam beam with mini squat at ends of beam > holding 5.5# TT: 6 laps , CGA/MinAx1  - Tandem ambulation on foam beam (attempted to pass 2# MB to self): 6 laps, CGA/MinAx1  - Standing on foam beam > tossing ball to wall: 20x, CGA/MinAx1      Assessment: Patient tolerated PT session well. Patient most challenged with lateral stepping on step with red resistance as noted by M/L instability and use of stepping strategy/minAx1 to maintain balance. In addition to this, she had difficulty stepping off likely due to decreased eccentric lateral musculature and core strength. Attempted to pass 2# MB to self while ambulating on foam beam, however she was unable to perform likely due to difficulties with dual tasking and coordination. Continue with PLOC to improve reduce fall risk and improve confidence in balance.       Plan: Continue per plan of care.  Dual tasking, sequencing         Outcome Measures 10/22/24        mCTSIB  - FTEO (firm)  - FTEC (firm)  - FTEO (foam)  - FTEC (foam)   30 sec  30 sec  30 sec  5 sec        DGI defer/24        FGA         DHI defer/100        JPET defer degrees                                                       "

## 2024-12-09 ENCOUNTER — OFFICE VISIT (OUTPATIENT)
Facility: CLINIC | Age: 84
End: 2024-12-09
Payer: MEDICARE

## 2024-12-09 DIAGNOSIS — H81.12 BENIGN PAROXYSMAL POSITIONAL VERTIGO OF LEFT EAR: ICD-10-CM

## 2024-12-09 DIAGNOSIS — R42 DIZZINESS: ICD-10-CM

## 2024-12-09 DIAGNOSIS — R26.89 IMBALANCE: Primary | ICD-10-CM

## 2024-12-09 PROCEDURE — 97112 NEUROMUSCULAR REEDUCATION: CPT

## 2024-12-09 NOTE — PROGRESS NOTES
Daily Note     Today's date: 2024  Patient name: Adelina Dyer  : 1940  MRN: [id number]  Referring provider: Narciso Ford CR*  Dx:   Encounter Diagnosis     ICD-10-CM    1. Imbalance  R26.89       2. Dizziness  R42       3. Benign paroxysmal positional vertigo of left ear  H81.12             Subjective: Patient presents to PT w/ SPC no new changes, complaints, or falls.    Objective: See treatment diary below:  BP: 124/66 mmHg  HR: 66 bpm  SpO2: 97%     NMR:   - STS with with 5.5# TT: 15x 15  - Overground ambulation w/ 5.5# TT while listing grocery store items: 300ft  - Standing on foam pad to #6 med ball roll to knock over cones (mimic bowling): 7x  - Tandem walking to #6 med ball roll to knock over cones (mimic bowling): 5x  - Trainer stairs w/ 5.5# TT while listing winter activities: 10x  - Side stepping on foam beam with HT/HN: 5 laps ea    Assessment: Patient tolerated PT session well with focus on balance and dual tasking. Introduced salient exercises to facilitate progression towards personal goals of improving walking tolerance and returning to bowling. She required frequent verbal cueing to continue with cognitive task this session suggesting limitations in dual tasking consider continuing to include in future sessions. Maintained close supervision to contact guard throughout session to improve patient independence and utilization of balance strategies. Continue with PLOC to improve reduce fall risk and improve confidence in balance.       Plan: Continue per plan of care.  Dual tasking, sequencing. Re-eval next session        Outcome Measures 10/22/24        mCTSIB  - FTEO (firm)  - FTEC (firm)  - FTEO (foam)  - FTEC (foam)   30 sec  30 sec  30 sec  5 sec        DGI defer/24        FGA         DHI defer/100        JPET defer degrees

## 2024-12-11 ENCOUNTER — APPOINTMENT (OUTPATIENT)
Facility: CLINIC | Age: 84
End: 2024-12-11
Payer: MEDICARE

## 2024-12-18 ENCOUNTER — EVALUATION (OUTPATIENT)
Facility: CLINIC | Age: 84
End: 2024-12-18
Payer: MEDICARE

## 2024-12-18 DIAGNOSIS — R26.89 IMBALANCE: Primary | ICD-10-CM

## 2024-12-18 DIAGNOSIS — R42 DIZZINESS: ICD-10-CM

## 2024-12-18 PROCEDURE — 97530 THERAPEUTIC ACTIVITIES: CPT

## 2024-12-18 NOTE — PROGRESS NOTES
PT Re- Evaluation          POC expires Unit limit Auth Expiration date PT/OT + Visit Limit? Co-Insurance   2024   BOMN Yes   2/3/25                            Visit/Unit Tracking  AUTH Status:  Date               None  Used                Remaining                      Today's date: 2024  Patient name: Adelina Dyer  : 1940  MRN: [id number]  Referring provider: Narciso Ford CR*  Dx:   Encounter Diagnosis     ICD-10-CM    1. Imbalance  R26.89       2. Dizziness  R42             Assessment  Assessment details: Patient is a 83 y.o. Female who presents to skilled outpatient PT with impaired balance. Patient's primary complaint initially was dizziness however this has resolved. Patient continues to report imbalance which has improved. Initially, patient required RW within the household and community. Now, patient no longer uses an AD in the household and SPC in the community. Patient made improvements on all outcome measures however, did not met MCID. This is likely due to patient completed all outcome measures with an SPC or no AD compared to using an RW which is seen as an significant improvement. Largest improvement on the FSTS as patient is now consider an LOW fall risk per [facility] neuro measures. However, patient is still consider an HIGH fall risk per TUG, FGA, and gait speed. Patient continues to demonstrate LE weakness, decreased gait speed, decreased aerobic activity, and impaired static/dynamic balance. Patient has met 1 STG and is progressing towards others.  She will benefit from skilled outpatient PT in order to improve her balance.      Patient verbalized understanding of POC.    Please contact me if you have any questions or recommendations. Thank you for the referral and the opportunity to share in Adelina Dyer's care.      Cut off score   All date taken from [facility] Neuro Section or Rehab  Measures    DGI:  MDC for Vestibular Disorders: 4 points  MDC for Geriatrics/Community Dwelling Older Adults: 3 Points  Falls risk cut off: <19/24    FGA:  MCID: 4 points  Geriatrics/Community Dwelling Older Adults: </= 22/30 fall risk  Geriatrics/Community Dwelling Older Adults: </= 20/30 unexplained falls in the next 6 months  Parkinsons: </= 18/30 fall risk    mCTSIB (normed on ages 20-60, lower number is less sway or better static balance)  Eyes open firm surface (norm 0.21-0.48)  Eyes closed firm surface (norm 0.48-0.99)  Eyes open foam surface (norm 0.38-0.71)  Eyes closed foam surface (norm 0.70-2.22)    DHI:  0-39: low perception of handicap  40-69: moderate perception of handicap  : severe perception of handicap  > 60: increased risk for falls    Joint Position Error Testing (JPET):  > 4.5 degrees: abnormal joint proprioception        Impairments: Abnormal coordination, abnormal gait, abnormal muscle tone, abnormal or restricted ROM, activity intolerance, impaired balance, impaired physical strength, lacks appropriate HEP, poor posture, poor body mechanics, pain with function, safety issue, abnormal movement  Understanding of Dx/Px/POC: good  Prognosis: excellent      Goals    BPPV Goals (4 weeks):  - Patient will report complete resolution of symptoms in order to promote return to PLOF- met   - Patient will complete FGA in order to promote return to safe performance of ADLs- met   - Patient will demonstrate (-) Jana-Hallpike test on L side- met   - Patient will demonstrate (-) Roll Test on L side- met     Goals as of 11/11/24  Short Term Goals (4 weeks):    - Patient will improve time on TUG by 2.9 seconds to facilitate improved safety in all ambulation  - progressing  - Patient will be independent in basic HEP 2-3 weeks  - Patient will improve 5xSTS score by 2.3 seconds to promote improved LE functional strength needed for ADLs - MET    Long Term Goals (12 weeks):  - Patient will be independent in a  comprehensive home exercise program  - Patient will improve scoring on DGI by 2.6 points to progress safety  - Patient will improve gait speed by 0.18 m/s to improve safety with community ambulation  - Patient will improve TOBAR by 6 points in order to improve static balance and reduce risk for falls  - Patient will improve scoring on FGA by 4 points to progress safety with dynamic tasks  - Patient will be able to demonstrate HT in gait without veering  - Patient will improve 6 Minute Walk Test score by 190 feet to promote improved cardiovascular endurance  - Patient will report 50% reduction in near falls in order to improve safety with functional tasks and reduce his risk for falls  - Patient will report going on walks at least 3 days per week to promote independence and improved cardiovascular endurance  - Patient will be able to ascend/descend stairs reciprocally with 1 UE assist to promote independence and safety with ADLs  - Patient will report 50% reduction in near falls when ambulating on uneven terrain      Cut off score    All date taken from [facility] Neuro Section or Rehab Measures      Tobar/56  MDC: 6 pts  Age Norms:  60-69: M - 55   F - 55  70-79: M - 54   F - 53  80-89: M - 53   F - 50 5xSTS: Roberto et al 2010  MDC: 2.3 sec  Age Norms:  60-69: 11.4 sec  70-79: 12.6 sec  80-89: 14.8 sec   TUG  MDC: 4.14 sec  Cut off score:  >13.5 sec community dwelling adults  >32.2 frail elderly  <20 I for basic transfers  >30 dependent on transfers 10 Meter Walk Test: Denny al 2011  MDC: 0.18 m/s  20-29: M - 1.35 m   F - 1.34 m  30-39: M - 1.43 m   F - 1.34 m  40-49: M - 1.43 m   F - 1.39 m  50-59: M - 1.43 m   F - 1.31 m  60-69: M - 1.34 m   F - 1.24 m  70-79: M - 1.26 m   F - 1.13 m  80-89: M - 0.97 m   F - 0.94 m    Household Ambulator < 0.4 m/s  Limited Community Ambulator 0.4 - 0.8 m/s  Community Ambulator 0.8 - 1.2 m/s  Safely cross the street > 1.2 m/s   FGA  MCID: 4 pts  Geriatrics/community <  22/30 fall risk  Geriatrics/community < 20/30 unexplained falls    DGI  MDC: vestibular - 4 pts  MDC: geriatric/community - 3 pts  Falls risk <19/24 mCTSIB  Norm: 20-60 yrs  Eyes open firm: norm sway 0.21-0.48  Eyes closed firm: norm sway 0.48-0.99  Eyes open foam: norm sway 0.38-0.71  Eyes closed foam: norm sway 0.70-2.22   6 Minute Walk Test  MDC: 190.98 ft  MCID: 164 ft    Age Norms  60-69: M - 1876 ft (571.80 m)  F - 1765 ft (537.98 m)  70-79: M - 1729 ft (527.00 m)  F - 1545 ft (470.92 m)  80-89: M - 1368 ft (416.97 m)  F - 1286 ft (391.97 m) ABC: Chevy & Chyna, 2003  <67% increased risk for falls   Coweta-Niranjan Monofilaments  Evaluator Size:        Force (grams):          Hand/Dorsal Thresholds:        Plantar Thresholds:  - 1.65                       - 0.008                       - Normal                                 - Normal  - 2.36                       - 0.02                         - Normal                                 - Normal  - 2.44                       - 0.04                         - Normal                                 - Normal  - 2.83                       - 0.07                         - Normal                                 - Normal  - 3.22                       - 0.16                         - Diminished light touch          - Normal  - 3.61                       - 0.40                         - Diminished light touch          - Normal  - 3.84                       - 0.60                         - Diminished protective           - Diminished light touch  - 4.08                       - 1.00                         - Diminished protective           - Diminished light touch  - 4.17                       - 1.40                         - Diminished protective           - Diminished light touch  - 4.31                       - 2.00                         - Diminished protective           - Diminished light touch  - 4.56                       - 4.00                         - Loss of  protective sense      - Diminished protective  - 4.74                       - 6.00                         - Loss of protective sense      - Diminished protective  - 4.93                       - 8.00                         - Loss of protective sense      - Diminished protective  - 5.07                       - 10.0                         - Loss of protective sense     - Loss of protective sense  - 5.18                       - 15.0                         - Loss of protective sense     - Loss of protective sense  - 5.46                       - 26.0                         - Loss of protective sense     - Loss of protective sense  - 5.88                       - 60.0                         - Loss of protective sense     - Loss of protective sense  - 6.10                       - 100                          - Loss of protective sense     - Loss of protective sense  - 6.45                       - 180                          - Loss of protective sense     - Loss of protective sense  - 6.65                       - 300                          - Deep pressure sense only  - Deep pressure sense only       Plan  Plan details: CRM, balance   Patient would benefit from: Skilled PT  Planned modality interventions: Biofeedback, Cryotherapy, TENS, Thermotherapy  Planned therapy interventions: Abdominal trunk stabilization, ADL training, balance, balance/WB training, breathing training, body mechanics training, coordination, flexibility, functional ROM exercises, gait training, HEP, manual therapy, Lee Taping, motor coordination training, neuromuscular re-education, patient education, postural training, strengthening, stretching, therapeutic activities, therapeutic exercises, work re-integration  Frequency: 2x/wk  Duration in weeks: 8 weeks  Plan of Care beginning date: 12/18/2024  Plan of Care expiration date: 8 weeks - 2/3/25  extend by 4 weeks   Treatment plan discussed with: patient         Subjective  "Evaluation    History of Present Illness  Mechanism of injury: Patient presents to physical therapy with reports of dizziness that began last Friday (Oct 4th, 2024). Went to the hospital which revealed she had acute pansinusitiswhich may be contributing to her symptoms. She reports that she cannot describe her dizziness. Initially, patient states that the dizziness was constant, however now it is not as frequent. She takes meclizine every day due to intensity of symptoms.     Updated 11/11/24: Patient reports that dizziness has resolved. In addition, she reports that her balance has improved significantly as she has not been utilizing her walker in her house.     Update 12/1824: Patient reports an improvement in her balance. No longer uses an AD in the household. No longer uses an RW in the community and only has been using the SPC.  Patient wants to continue to work on her balance for at least another month. Patient has an goal to return back to bowling. No falls over the past month. No longer have reports of dizziness.    Patient goal: be asymptomatic       Dizziness Subjective  How long does dizziness last: initially, constant. Currently, only getting up   How would you describe the dizziness: \"I'm unsure\"  Rolling in bed: No  Supine to/from sit: No  Recent hearing loss: No  Tinnitus: No  Aural fullness/ear pain: No  Vision changes: Yes  History of recent viral infections: Yes, Orthostatics negative - CT of head negative for pathologic etiology, however, read as \"acute pansinusitis\"   History of migraines: No    Red Flag Screen  - Numbness: Yes, \"in my legs at night sometimes\"  - Tingling: Yes, \"in my legs at night sometimes\"  - Weakness: Yes, \"in my legs at night sometimes\"  - Unilateral hearing loss: No  - Slurred speech: No  - Progressive hearing loss: No  - Tremors: No  - Poor coordination: No  - UMN signs: No  - LoC: No  - Rigidity: No  - Visual field loss: No  - Memory loss: No  - CN dysfunction: No  - " "Vertical nystagmus: No    Pain  No pain reported     Social Support  Steps to enter house: 2  Stairs in house: 1   Lives in: house   Lives with: alone     Employment status: retired   Hand dominance: R    Treatments  Previous treatment: none  Current treatment: none   Diagnostic Testing:   Abnormal CT scan  CT imaging with radiologist read of \"acute pansinusitis\"  Patient currently without sinus symptoms - remains afebrile without leukocytosis  Given a dose of IV Unasyn in the ED -> observe off further antibiotics for now, as even if true infection, more likely viral in nature  Procalcitonin normal       Objective     Vestibular Objective  Cervical Spine AROM:  - Flexion: minimal limitation no pain  - Extension: moderate limitation no pain  - R Rotation: moderate limitation pain at end range  - L Rotation: moderate limitation pain at end range  - R Lateral Flexion: severe limitation no pain  - L Lateral Flexion: severe limitation no pain    Integrity Testing  - mVBI: intact   - Sharp Abelardo: intact  - Alar Stability Test: intact       Coordination Screen  - Dysmetria: intact  - Dysdiadochokinesia: intact         Outcome Measures 10/22/24 11/11/24 12/18/24      mCTSIB  - FTEO (firm)  - FTEC (firm)  - FTEO (foam)  - FTEC (foam)   30 sec  30 sec  30 sec  5 sec 30 sec  30 sec  30 sec +   30 sec ++ 30 sec  30 sec  30 sec +   30 sec ++      FGA 11/30 15/30 18/30      6 MWT  700 ft with walker  760 feet with SPC       10 MWT  0.78 m/s with walker  0.75 m/s without AD      5 x STS   15.03 sec, pushing off thighs  12.90 seconds, pushing of thighs      TUG  13.94 sec with walker  13.62 sec without AD                    Precautions: HT  Past Medical History:   Diagnosis Date    Coronary artery disease     Hyperlipidemia     Hypertension     Hypothyroidism     MI, old     UTI (urinary tract infection) 07/26/2022                         "

## 2024-12-19 ENCOUNTER — APPOINTMENT (OUTPATIENT)
Dept: LAB | Facility: CLINIC | Age: 84
End: 2024-12-19
Payer: MEDICARE

## 2024-12-19 DIAGNOSIS — E03.9 HYPOTHYROIDISM, UNSPECIFIED TYPE: ICD-10-CM

## 2024-12-19 LAB — TSH SERPL DL<=0.05 MIU/L-ACNC: 2.22 UIU/ML (ref 0.45–4.5)

## 2024-12-19 PROCEDURE — 84443 ASSAY THYROID STIM HORMONE: CPT

## 2024-12-19 PROCEDURE — 36415 COLL VENOUS BLD VENIPUNCTURE: CPT

## 2024-12-23 ENCOUNTER — RESULTS FOLLOW-UP (OUTPATIENT)
Dept: FAMILY MEDICINE CLINIC | Facility: CLINIC | Age: 84
End: 2024-12-23

## 2025-01-01 DIAGNOSIS — I10 PRIMARY HYPERTENSION: ICD-10-CM

## 2025-01-02 ENCOUNTER — OFFICE VISIT (OUTPATIENT)
Facility: CLINIC | Age: 85
End: 2025-01-02
Payer: MEDICARE

## 2025-01-02 DIAGNOSIS — R26.89 IMBALANCE: Primary | ICD-10-CM

## 2025-01-02 DIAGNOSIS — R42 DIZZINESS: ICD-10-CM

## 2025-01-02 PROCEDURE — 97112 NEUROMUSCULAR REEDUCATION: CPT

## 2025-01-02 RX ORDER — METOPROLOL SUCCINATE 25 MG/1
25 TABLET, EXTENDED RELEASE ORAL DAILY
Qty: 90 TABLET | Refills: 1 | Status: SHIPPED | OUTPATIENT
Start: 2025-01-02

## 2025-01-02 NOTE — PROGRESS NOTES
"Daily Note     Today's date: 2025  Patient name: Adelina Dyer  : 1940  MRN: [id number]  Referring provider: Narciso Ford CR*  Dx:   Encounter Diagnosis     ICD-10-CM    1. Imbalance  R26.89       2. Dizziness  R42             Subjective: Patient presents to PT w/ SPC. She reports no new falls. Notes she had been having sciatic pain but it is better now.       Objective: See treatment diary below:    BP: 104/60 mmHg  HR: 74 bpm  SpO2: 95%     NMR:   - Overground ambulation w/ 5.5# TT while naming cities: 300 ft  - Side stepping on foam beam with HT/HN: 5 laps each  - Trainer stairs while naming foods alphabetically, no UE   - Tandem walking to #6 med ball roll to knock over cones (mimic bowling): 4x  - Walking with speed changes: 150 feet   - Agility ladder in/out: 3 laps down/back  - Step taps from foam beam to 6\" step: 20 reps B/L    Assessment: Patient tolerated PT session well with focus on balance and dual tasking. Good ability to maintain dual tasking depending on cognitive task. Challenged with tandem walking with tendency to widen base of support. Maintained close supervision to contact guard throughout session to improve patient independence and utilization of balance strategies. Continue with PLOC to improve reduce fall risk and improve confidence in balance.       Plan: Continue per plan of care.  Dual tasking, sequencing.         Outcome Measures 10/22/24 11/11/24 12/18/24      mCTSIB  - FTEO (firm)  - FTEC (firm)  - FTEO (foam)  - FTEC (foam)   30 sec  30 sec  30 sec  5 sec 30 sec  30 sec  30 sec +   30 sec ++ 30 sec  30 sec  30 sec +   30 sec ++      FGA 11/30 15/30 18/30      6 MWT  700 ft with walker  760 feet with SPC       10 MWT  0.78 m/s with walker  0.75 m/s without AD      5 x STS   15.03 sec, pushing off thighs  12.90 seconds, pushing of thighs      TUG  13.94 sec with walker  13.62 sec without AD                  "

## 2025-01-06 ENCOUNTER — CONSULT (OUTPATIENT)
Dept: OTOLARYNGOLOGY | Facility: CLINIC | Age: 85
End: 2025-01-06
Payer: MEDICARE

## 2025-01-06 ENCOUNTER — TELEPHONE (OUTPATIENT)
Age: 85
End: 2025-01-06

## 2025-01-06 VITALS
HEIGHT: 62 IN | TEMPERATURE: 97.8 F | BODY MASS INDEX: 30.36 KG/M2 | WEIGHT: 165 LBS | OXYGEN SATURATION: 96 % | HEART RATE: 84 BPM

## 2025-01-06 DIAGNOSIS — J31.0 CHRONIC RHINITIS: ICD-10-CM

## 2025-01-06 DIAGNOSIS — J32.4 CHRONIC PANSINUSITIS: Primary | ICD-10-CM

## 2025-01-06 DIAGNOSIS — R05.9 COUGH, UNSPECIFIED TYPE: ICD-10-CM

## 2025-01-06 DIAGNOSIS — R09.82 PND (POST-NASAL DRIP): ICD-10-CM

## 2025-01-06 PROCEDURE — 99203 OFFICE O/P NEW LOW 30 MIN: CPT | Performed by: SPECIALIST

## 2025-01-06 RX ORDER — FLUTICASONE PROPIONATE 50 MCG
2 SPRAY, SUSPENSION (ML) NASAL 2 TIMES DAILY
Qty: 15.8 ML | Refills: 3 | Status: SHIPPED | OUTPATIENT
Start: 2025-01-06 | End: 2025-01-13

## 2025-01-06 NOTE — PROGRESS NOTES
[facility]  [facility]  [facility] Otolaryngology  Otolaryngology -- Head and Neck Surgery New Patient Visit  Adelina Dyer is a 84 y.o. who presents with a chief complaint of post nasal drip, sinus pressure, upper teeth pain, coughing, throat clearing. Admitted to the hospital in October for this, also found to have BPPV, and has been treated with PT/OT since then and has had improvement in dizziness.   She had a course of antibiotics after the sinus infection in October and did feel better. CT head reviewed on 10/4/24.  She is currently on flonase and zyrtec.   She is moderately bothered by her symptoms (4/10).    Review of systems: Pertinent review of systems documented in the HPI. 10 point ROS documented in a separate note, as necessary.  Results reviewed; images from any scan have been personally reviewed:        The past medical, surgical, social and family history have been reviewed as documented in today's record.  Past Medical History:   Diagnosis Date    Coronary artery disease     Dizziness     Ear problems     Hyperlipidemia     Hypertension     Hypothyroidism     MI, old     UTI (urinary tract infection) 07/26/2022     Past Surgical History:   Procedure Laterality Date    CHOLECYSTECTOMY      CORONARY ANGIOPLASTY      2001     ND XCAPSL CTRC RMVL INSJ IO LENS PROSTH W/O ECP Right 8/1/2022    Procedure: EXTRACTION EXTRACAPSULAR CATARACT PHACO INTRAOCULAR LENS (IOL);  Surgeon: Finn Heredia MD;  Location: [facility] MAIN OR;  Service: Ophthalmology    ND XCAPSL CTRC RMVL INSJ IO LENS PROSTH W/O ECP Left 10/17/2022    Procedure: EXTRACTION EXTRACAPSULAR CATARACT PHACO INTRAOCULAR LENS (IOL);  Surgeon: Finn Heredia MD;  Location: [facility] MAIN OR;  Service: Ophthalmology    TONSILLECTOMY       Family History   Problem Relation Age of Onset    Thyroid disease Mother     Lung cancer Mother     Cirrhosis Father     Heart attack Sister     No Known Problems Sister     No Known  "Problems Sister     No Known Problems Daughter     No Known Problems Daughter     No Known Problems Daughter     No Known Problems Maternal Aunt     No Known Problems Maternal Aunt     No Known Problems Maternal Aunt     No Known Problems Maternal Aunt     No Known Problems Maternal Aunt      Current Outpatient Medications on File Prior to Visit   Medication Sig Dispense Refill    amLODIPine (NORVASC) 5 mg tablet Take 5 mg by mouth daily      Biotin 2500 MCG CAPS Take by mouth daily      Cholecalciferol (VITAMIN D-3 PO) Take by mouth daily      fenofibrate (TRICOR) 145 mg tablet Take 145 mg by mouth daily      fluticasone (FLONASE) 50 mcg/act nasal spray 2 sprays into each nostril daily 18 mL 3    hydrochlorothiazide (HYDRODIURIL) 12.5 mg tablet Take 12.5 mg by mouth daily      levothyroxine (Synthroid) 125 mcg tablet Take 1 tablet (125 mcg total) by mouth daily 90 tablet 3    meclizine (ANTIVERT) 25 mg tablet Take 1 tablet (25 mg total) by mouth 3 (three) times a day as needed for dizziness 30 tablet 0    metoprolol succinate (TOPROL-XL) 25 mg 24 hr tablet TAKE 1 TABLET BY MOUTH ONCE  DAILY 90 tablet 1    omeprazole (PriLOSEC) 20 mg delayed release capsule Take 1 capsule (20 mg total) by mouth daily 30 capsule 5    Saccharomyces boulardii (Probiotic) 250 MG CAPS Take by mouth      simvastatin (ZOCOR) 20 mg tablet TAKE 1 TABLET BY MOUTH DAILY AT  BEDTIME 90 tablet 1    valsartan (DIOVAN) 160 mg tablet Take 2 tablets (320 mg total) by mouth daily 90 tablet 3    Vascepa 1 g CAPS       nitroglycerin (NITROSTAT) 0.4 mg SL tablet if needed (Patient not taking: Reported on 1/6/2025)       No current facility-administered medications on file prior to visit.      Physical exam:   Pulse 84   Temp 97.8 °F (36.6 °C) (Temporal)   Ht 5' 1.5\" (1.562 m)   Wt 74.8 kg (165 lb)   SpO2 96%   BMI 30.67 kg/m²   Head: Atraumatic, no visible scalp lesions, parotid and submandibular salivary glands non-tender to palpation and without " masses bilaterally.   Neck:  No visible or palpable cervical lesions or lymphadenopathy, thyroid gland is normal in size and symmetry and without masses, normal laryngeal elevation with swallowing.   Ears:    Right ear :  Auricle normal in appearance, mastoid prominence non-tender, external auditory canal clear. Tympanic membranes intact.   Left ear :  Auricle normal in appearance, mastoid prominence non-tender, external auditory canal clear . Tympanic membranes intact.   Nose/Sinuses:  External appearance unremarkable, no maxillary or frontal sinus tenderness to palpation bilaterally. Anterior rhinoscopy reveals:   Oral Cavity:  Moist mucus membranes, gums and dentition unremarkable, no oral mucosal masses or lesions, floor of mouth soft, tongue mobile without masses or lesions.   Oropharynx:  Base of tongue soft and without masses, tonsils bilaterally unremarkable, soft palate mucosa unremarkable.      Eyes:  Extra-ocular movements intact, pupils equally round and reactive to light and accommodation, the lids and conjunctivae are normal in appearance.  Constitutional:  Well developed, well nourished and groomed, in no acute distress.   Cardiovascular:  Normal rate and rhythm, no palpable thrills, no jugulovenous distension observed.  Respiratory:  Normal respiratory effort without evidence of retractions or use of accessory muscles.  Neurologic:  Cranial nerves II-XII intact bilaterally.  Abdomen: Soft and lax  Extremities: No bruises   Psychiatric:  Alert and oriented to time, place and person.  Procedures    Assessment:   1. Chronic pansinusitis  fluticasone (FLONASE) 50 mcg/act nasal spray      2. Chronic rhinitis  Ambulatory Referral to Otolaryngology      3. PND (post-nasal drip)        4. Cough, unspecified type          Orders  No orders of the defined types were placed in this encounter.    Discussion/Plan:   Chronic rhinosinusitis, Rhinitis and Post nasal drip   Discussed treatment options including use  of saline rinses, increase nasal steroids to BID, allergy medications, allergy testing, imaging, and further surgical interventions. Given instructions on use of nasal steroids, addition of saline rinses and continuing allergy medications.

## 2025-01-06 NOTE — TELEPHONE ENCOUNTER
Patient's daughter called in to ask if someone from the [facility] office could give her a call, her mother was seen by Dr Dill today and wanted to ask for hearing aide recommendations but she forgot.  The daughter's name is Shira and her phone number is . Thanks.

## 2025-01-08 ENCOUNTER — OFFICE VISIT (OUTPATIENT)
Facility: CLINIC | Age: 85
End: 2025-01-08
Payer: MEDICARE

## 2025-01-08 DIAGNOSIS — R26.89 IMBALANCE: Primary | ICD-10-CM

## 2025-01-08 DIAGNOSIS — H81.12 BENIGN PAROXYSMAL POSITIONAL VERTIGO OF LEFT EAR: ICD-10-CM

## 2025-01-08 DIAGNOSIS — R42 DIZZINESS: ICD-10-CM

## 2025-01-08 PROCEDURE — 97112 NEUROMUSCULAR REEDUCATION: CPT

## 2025-01-08 NOTE — PROGRESS NOTES
"Daily Note     Today's date: 2025  Patient name: Adelina Dyer  : 1940  MRN: [id number]  Referring provider: Narciso Ford CR*  Dx:   Encounter Diagnosis     ICD-10-CM    1. Imbalance  R26.89       2. Dizziness  R42       3. Benign paroxysmal positional vertigo of left ear  H81.12             Subjective: Patient presents to PT w/ SPC. She reports no new falls.       Objective: See treatment diary below:    BP: 120/69 mmHg  HR: 59 bpm  SpO2: 96%     NMR:   - Overground ambulation w/ 5.5# TT while naming plants/flowers: 300 ft  - Side stepping on foam beam with HT/HN > 5.5# TT: 5 laps each  - Trainer stairs while naming country artists, no UE   - Walking fwd/bwk with speed changes (slow, medium, fast): 150 feet   - Agility ladder in/out with 5.5# TT: 4 laps down/back  - Step taps from foam beam to 6\" step: 20 reps B/L  - Marching with 5.5# TT: 50 ft    Assessment: Patient tolerated PT session well with focus on balance and dual tasking. Patient most challenged with changing speed with fwd/bwk ambulation (especially with backwards) as noted by scissoring of gait leading to LoB. Decreased ROM noted with marching with 5.5#TT, likely due to decreased hip flexion strength, however this may be due to muscular fatigue as it was towards the end of the session. Continue with PLOC to improve reduce fall risk and improve confidence in balance.       Plan: Continue per plan of care.  Dual tasking, sequencing.         Outcome Measures 10/22/24 11/11/24 12/18/24      mCTSIB  - FTEO (firm)  - FTEC (firm)  - FTEO (foam)  - FTEC (foam)   30 sec  30 sec  30 sec  5 sec 30 sec  30 sec  30 sec +   30 sec ++ 30 sec  30 sec  30 sec +   30 sec ++      FGA 11/30 15/30 18/30      6 MWT  700 ft with walker  760 feet with SPC       10 MWT  0.78 m/s with walker  0.75 m/s without AD      5 x STS   15.03 sec, pushing off thighs  12.90 seconds, pushing of thighs      TUG  13.94 sec with walker  13.62 sec without AD              "

## 2025-01-13 ENCOUNTER — OFFICE VISIT (OUTPATIENT)
Facility: CLINIC | Age: 85
End: 2025-01-13
Payer: MEDICARE

## 2025-01-13 DIAGNOSIS — J32.4 CHRONIC PANSINUSITIS: Primary | ICD-10-CM

## 2025-01-13 DIAGNOSIS — H81.12 BENIGN PAROXYSMAL POSITIONAL VERTIGO OF LEFT EAR: ICD-10-CM

## 2025-01-13 DIAGNOSIS — R26.89 IMBALANCE: Primary | ICD-10-CM

## 2025-01-13 DIAGNOSIS — R42 DIZZINESS: ICD-10-CM

## 2025-01-13 PROCEDURE — 97112 NEUROMUSCULAR REEDUCATION: CPT

## 2025-01-13 RX ORDER — FLUTICASONE PROPIONATE 50 MCG
1 SPRAY, SUSPENSION (ML) NASAL 2 TIMES DAILY
Qty: 1 G | Refills: 6 | Status: SHIPPED | OUTPATIENT
Start: 2025-01-13

## 2025-01-13 NOTE — PROGRESS NOTES
Daily Note     Today's date: 2025  Patient name: Adelina Dyer  : 1940  MRN: [id number]  Referring provider: Narciso Ford CR*  Dx:   Encounter Diagnosis     ICD-10-CM    1. Imbalance  R26.89       2. Dizziness  R42       3. Benign paroxysmal positional vertigo of left ear  H81.12             Subjective: Patient presents to PT w/ SPC. She reports no new falls. ~5-10 min late.      Objective: See treatment diary below:    BP: 117/70 mmHg  HR: 86 bpm  SpO2: 96%     NMR:   - Step taps onto med RR with 18# KB in RUE: 20x   - 4 square royal negotiation 6 in (attempted cog task): 2 min, 2 sets  - MB ball slam 8#: 10x   - Ambulation with 5.5# TT around body: 120 ft   - Kicking 8# MB around cones: 3 min     Assessment: Patient tolerated PT session well with focus on balance and dual tasking. Patient most challenged with negotiating hurdles with cog task as she was unable to count at the same time, suggesting difficulties with dual tasking. Good ability to pass MB around cones, illustrating improvements in SLS balance and core control. Required seated rest breaks secondary to fatigue. Continue with PLOC to improve reduce fall risk and improve confidence in balance.       Plan: Continue per plan of care.  Dual tasking, sequencing.         Outcome Measures 10/22/24 11/11/24 12/18/24      mCTSIB  - FTEO (firm)  - FTEC (firm)  - FTEO (foam)  - FTEC (foam)   30 sec  30 sec  30 sec  5 sec 30 sec  30 sec  30 sec +   30 sec ++ 30 sec  30 sec  30 sec +   30 sec ++      FGA 11/30 15/30 18/30      6 MWT  700 ft with walker  760 feet with SPC       10 MWT  0.78 m/s with walker  0.75 m/s without AD      5 x STS   15.03 sec, pushing off thighs  12.90 seconds, pushing of thighs      TUG  13.94 sec with walker  13.62 sec without AD

## 2025-01-15 ENCOUNTER — OFFICE VISIT (OUTPATIENT)
Facility: CLINIC | Age: 85
End: 2025-01-15
Payer: MEDICARE

## 2025-01-15 DIAGNOSIS — R26.89 IMBALANCE: Primary | ICD-10-CM

## 2025-01-15 DIAGNOSIS — H81.12 BENIGN PAROXYSMAL POSITIONAL VERTIGO OF LEFT EAR: ICD-10-CM

## 2025-01-15 DIAGNOSIS — R42 DIZZINESS: ICD-10-CM

## 2025-01-15 PROCEDURE — 97112 NEUROMUSCULAR REEDUCATION: CPT

## 2025-01-15 NOTE — PROGRESS NOTES
Daily Note     Today's date: 1/15/2025  Patient name: Adelina Dyer  : 1940  MRN: [id number]  Referring provider: Narciso Ford CR*  Dx:   Encounter Diagnosis     ICD-10-CM    1. Imbalance  R26.89       2. Dizziness  R42       3. Benign paroxysmal positional vertigo of left ear  H81.12             Subjective: Patient presents to PT w/ SPC. She reports no new falls.      Objective: See treatment diary below:    BP: 119/71 mmHg  HR: 59 bpm  SpO2: 97%     NMR:   - In and out ladder with 5.5# TT: 4 laps   - Stepping fwd/bwk on foam pad + RR with 5.5# TT: 2 min  - Lateral stepping with 5.5# TT: 2 min  - Lateral stairs negotiation with 5.5# TT: 2 min   - Ambulation with 18# KB: 60 ft, 2 sets     Assessment: Patient tolerated PT session well with focus on balance and dual tasking. Patient with excellent trunk posture with overground ambulation, suggesting good core control and strength. Occasional hip ER with lateral stairs negotiation likely due to gluteus medius weakness, however this improved without requiring verbal cueing, suggesting good intrinsic feedback. Continue with PLOC to improve reduce fall risk and improve confidence in balance.       Plan: Continue per plan of care.  Dual tasking, sequencing.         Outcome Measures 10/22/24 11/11/24 12/18/24      mCTSIB  - FTEO (firm)  - FTEC (firm)  - FTEO (foam)  - FTEC (foam)   30 sec  30 sec  30 sec  5 sec 30 sec  30 sec  30 sec +   30 sec ++ 30 sec  30 sec  30 sec +   30 sec ++      FGA 11/30 15/30 18/30      6 MWT  700 ft with walker  760 feet with SPC       10 MWT  0.78 m/s with walker  0.75 m/s without AD      5 x STS   15.03 sec, pushing off thighs  12.90 seconds, pushing of thighs      TUG  13.94 sec with walker  13.62 sec without AD

## 2025-01-23 ENCOUNTER — EVALUATION (OUTPATIENT)
Facility: CLINIC | Age: 85
End: 2025-01-23
Payer: MEDICARE

## 2025-01-23 DIAGNOSIS — H81.12 BENIGN PAROXYSMAL POSITIONAL VERTIGO OF LEFT EAR: ICD-10-CM

## 2025-01-23 DIAGNOSIS — R42 DIZZINESS: ICD-10-CM

## 2025-01-23 DIAGNOSIS — R26.89 IMBALANCE: Primary | ICD-10-CM

## 2025-01-23 PROCEDURE — 97530 THERAPEUTIC ACTIVITIES: CPT

## 2025-01-23 NOTE — PROGRESS NOTES
PT Re- Evaluation          POC expires Unit limit Auth Expiration date PT/OT + Visit Limit? Co-Insurance   2024   BOMN Yes   2/3/25                            Visit/Unit Tracking  AUTH Status:  Date               None  Used                Remaining                      Today's date: 2025  Patient name: Adelina Dyer  : 1940  MRN: [id number]  Referring provider: Narciso Ford CR*  Dx:   Encounter Diagnosis     ICD-10-CM    1. Imbalance  R26.89       2. Dizziness  R42       3. Benign paroxysmal positional vertigo of left ear  H81.12             Assessment  Assessment details: Patient is a 83 y.o. Female who presents to skilled outpatient PT with impaired balance. Patient's primary complaint initially was dizziness, however this has resolved. Patient has been primarily focusing on balance, strength and endurance retraining. Since her last reassessment, patient has made progress in the following outcome measures: TUG, 10 MWT, FGA and mCTSIB, suggesting improvements in functional mobility, gait speed, dynamic balance and decreased visual reliance for balance. Mild, non-significant regressions noted in 5 x STS. Although patient illustrated a mild decline in 6 MWT, patient was able to perform this test without use of AD, suggesting excellent improvements in dynamic balance and control strength to maintain balance with SLS during ambulation. Due to subjective improvements in her balance, strength and endurance and excellent improvements noted in the aforementioned outcome measures, plan to DC at this time. Patient is in agreement. She met 4 LTG.     Patient verbalized understanding of POC.    Please contact me if you have any questions or recommendations. Thank you for the referral and the opportunity to share in Adelina Dyer's care.      Cut off score   All date taken from APTA Neuro Section or Rehab Measures    DGI:  MDC  for Vestibular Disorders: 4 points  MDC for Geriatrics/Community Dwelling Older Adults: 3 Points  Falls risk cut off: <19/24    FGA:  MCID: 4 points  Geriatrics/Community Dwelling Older Adults: </= 22/30 fall risk  Geriatrics/Community Dwelling Older Adults: </= 20/30 unexplained falls in the next 6 months  Parkinsons: </= 18/30 fall risk    mCTSIB (normed on ages 20-60, lower number is less sway or better static balance)  Eyes open firm surface (norm 0.21-0.48)  Eyes closed firm surface (norm 0.48-0.99)  Eyes open foam surface (norm 0.38-0.71)  Eyes closed foam surface (norm 0.70-2.22)    DHI:  0-39: low perception of handicap  40-69: moderate perception of handicap  : severe perception of handicap  > 60: increased risk for falls    Joint Position Error Testing (JPET):  > 4.5 degrees: abnormal joint proprioception        Impairments: Abnormal coordination, abnormal gait, abnormal muscle tone, abnormal or restricted ROM, activity intolerance, impaired balance, impaired physical strength, lacks appropriate HEP, poor posture, poor body mechanics, pain with function, safety issue, abnormal movement  Understanding of Dx/Px/POC: good  Prognosis: excellent      Goals    BPPV Goals (4 weeks):  - Patient will report complete resolution of symptoms in order to promote return to PLOF- met   - Patient will complete FGA in order to promote return to safe performance of ADLs- met   - Patient will demonstrate (-) Jana-Hallpike test on L side- met   - Patient will demonstrate (-) Roll Test on L side- met     Goals as of 11/11/24  Short Term Goals (4 weeks):    - Patient will improve time on TUG by 2.9 seconds to facilitate improved safety in all ambulation  - progressing  - Patient will be independent in basic HEP 2-3 weeks  - Patient will improve 5xSTS score by 2.3 seconds to promote improved LE functional strength needed for ADLs - MET    Long Term Goals (12 weeks):  - Patient will be independent in a comprehensive home  exercise program- nm  - Patient will improve scoring on DGI by 2.6 points to progress safety-na  - Patient will improve gait speed by 0.18 m/s to improve safety with community ambulation-na, improved by 0.10  - Patient will improve TOBAR by 6 points in order to improve static balance and reduce risk for falls- na   - Patient will improve scoring on FGA by 4 points to progress safety with dynamic tasks- met  - Patient will be able to demonstrate HT in gait without veering-nm  - Patient will improve 6 Minute Walk Test score by 190 feet to promote improved cardiovascular endurance-nm  - Patient will report 50% reduction in near falls in order to improve safety with functional tasks and reduce his risk for falls-met  - Patient will report going on walks at least 3 days per week to promote independence and improved cardiovascular endurance-nm  - Patient will be able to ascend/descend stairs reciprocally with 1 UE assist to promote independence and safety with ADLs-met  - Patient will report 50% reduction in near falls when ambulating on uneven terrain-met       Cut off score    All date taken from APTA Neuro Section or Rehab Measures      Tobar/56  MDC: 6 pts  Age Norms:  60-69: M - 55   F - 55  70-79: M - 54   F - 53  80-89: M - 53   F - 50 5xSTS: Roberto et al 2010  MDC: 2.3 sec  Age Norms:  60-69: 11.4 sec  70-79: 12.6 sec  80-89: 14.8 sec   TUG  MDC: 4.14 sec  Cut off score:  >13.5 sec community dwelling adults  >32.2 frail elderly  <20 I for basic transfers  >30 dependent on transfers 10 Meter Walk Test: Laurie and Marcel et al 2011  MDC: 0.18 m/s  20-29: M - 1.35 m   F - 1.34 m  30-39: M - 1.43 m   F - 1.34 m  40-49: M - 1.43 m   F - 1.39 m  50-59: M - 1.43 m   F - 1.31 m  60-69: M - 1.34 m   F - 1.24 m  70-79: M - 1.26 m   F - 1.13 m  80-89: M - 0.97 m   F - 0.94 m    Household Ambulator < 0.4 m/s  Limited Community Ambulator 0.4 - 0.8 m/s  Community Ambulator 0.8 - 1.2 m/s  Safely cross the street > 1.2 m/s    FGA  MCID: 4 pts  Geriatrics/community < 22/30 fall risk  Geriatrics/community < 20/30 unexplained falls    DGI  MDC: vestibular - 4 pts  MDC: geriatric/community - 3 pts  Falls risk <19/24 mCTSIB  Norm: 20-60 yrs  Eyes open firm: norm sway 0.21-0.48  Eyes closed firm: norm sway 0.48-0.99  Eyes open foam: norm sway 0.38-0.71  Eyes closed foam: norm sway 0.70-2.22   6 Minute Walk Test  MDC: 190.98 ft  MCID: 164 ft    Age Norms  60-69: M - 1876 ft (571.80 m)  F - 1765 ft (537.98 m)  70-79: M - 1729 ft (527.00 m)  F - 1545 ft (470.92 m)  80-89: M - 1368 ft (416.97 m)  F - 1286 ft (391.97 m) ABC: Chevy & Chyna, 2003  <67% increased risk for falls   [place] Monofilaments  Evaluator Size:        Force (grams):          Hand/Dorsal Thresholds:        Plantar Thresholds:  - 1.65                       - 0.008                       - Normal                                 - Normal  - 2.36                       - 0.02                         - Normal                                 - Normal  - 2.44                       - 0.04                         - Normal                                 - Normal  - 2.83                       - 0.07                         - Normal                                 - Normal  - 3.22                       - 0.16                         - Diminished light touch          - Normal  - 3.61                       - 0.40                         - Diminished light touch          - Normal  - 3.84                       - 0.60                         - Diminished protective           - Diminished light touch  - 4.08                       - 1.00                         - Diminished protective           - Diminished light touch  - 4.17                       - 1.40                         - Diminished protective           - Diminished light touch  - 4.31                       - 2.00                         - Diminished protective           - Diminished light touch  - 4.56                        - 4.00                         - Loss of protective sense      - Diminished protective  - 4.74                       - 6.00                         - Loss of protective sense      - Diminished protective  - 4.93                       - 8.00                         - Loss of protective sense      - Diminished protective  - 5.07                       - 10.0                         - Loss of protective sense     - Loss of protective sense  - 5.18                       - 15.0                         - Loss of protective sense     - Loss of protective sense  - 5.46                       - 26.0                         - Loss of protective sense     - Loss of protective sense  - 5.88                       - 60.0                         - Loss of protective sense     - Loss of protective sense  - 6.10                       - 100                          - Loss of protective sense     - Loss of protective sense  - 6.45                       - 180                          - Loss of protective sense     - Loss of protective sense  - 6.65                       - 300                          - Deep pressure sense only  - Deep pressure sense only       Plan  Plan details: CRM, balance   Patient would benefit from: Skilled PT  Planned modality interventions: Biofeedback, Cryotherapy, TENS, Thermotherapy  Planned therapy interventions: Abdominal trunk stabilization, ADL training, balance, balance/WB training, breathing training, body mechanics training, coordination, flexibility, functional ROM exercises, gait training, HEP, manual therapy, Lee Taping, motor coordination training, neuromuscular re-education, patient education, postural training, strengthening, stretching, therapeutic activities, therapeutic exercises, work re-integration  Frequency: 2x/wk  Duration in weeks: 8 weeks  Plan of Care beginning date: 1/23/2025  Plan of Care expiration date: 8 weeks - 2/3/25  extend by 4 weeks   Treatment plan discussed with:  "patient         Subjective Evaluation    History of Present Illness  Mechanism of injury: Patient presents to physical therapy with reports of dizziness that began last Friday (Oct 4th, 2024). Went to the hospital which revealed she had acute pansinusitiswhich may be contributing to her symptoms. She reports that she cannot describe her dizziness. Initially, patient states that the dizziness was constant, however now it is not as frequent. She takes meclizine every day due to intensity of symptoms.     Updated 11/11/24: Patient reports that dizziness has resolved. In addition, she reports that her balance has improved significantly as she has not been utilizing her walker in her house.     Update 12/18/24: Patient reports an improvement in her balance. No longer uses an AD in the household. No longer uses an RW in the community and only has been using the SPC.  Patient wants to continue to work on her balance for at least another month. Patient has an goal to return back to bowling. No falls over the past month. No longer have reports of dizziness.    Updated 1/23/25: Patient reports that her balance and strength have improved.     Patient goal: be asymptomatic       Dizziness Subjective  How long does dizziness last: initially, constant. Currently, only getting up   How would you describe the dizziness: \"I'm unsure\"  Rolling in bed: No  Supine to/from sit: No  Recent hearing loss: No  Tinnitus: No  Aural fullness/ear pain: No  Vision changes: Yes  History of recent viral infections: Yes, Orthostatics negative - CT of head negative for pathologic etiology, however, read as \"acute pansinusitis\"   History of migraines: No    Red Flag Screen  - Numbness: Yes, \"in my legs at night sometimes\"  - Tingling: Yes, \"in my legs at night sometimes\"  - Weakness: Yes, \"in my legs at night sometimes\"  - Unilateral hearing loss: No  - Slurred speech: No  - Progressive hearing loss: No  - Tremors: No  - Poor coordination: No  - UMN " "signs: No  - LoC: No  - Rigidity: No  - Visual field loss: No  - Memory loss: No  - CN dysfunction: No  - Vertical nystagmus: No    Pain  No pain reported     Social Support  Steps to enter house: 2  Stairs in house: 1   Lives in: house   Lives with: alone     Employment status: retired   Hand dominance: R    Treatments  Previous treatment: none  Current treatment: none   Diagnostic Testing:   Abnormal CT scan  CT imaging with radiologist read of \"acute pansinusitis\"  Patient currently without sinus symptoms - remains afebrile without leukocytosis  Given a dose of IV Unasyn in the ED -> observe off further antibiotics for now, as even if true infection, more likely viral in nature  Procalcitonin normal       Objective     Vestibular Objective  Cervical Spine AROM:  - Flexion: minimal limitation no pain  - Extension: moderate limitation no pain  - R Rotation: moderate limitation pain at end range  - L Rotation: moderate limitation pain at end range  - R Lateral Flexion: severe limitation no pain  - L Lateral Flexion: severe limitation no pain    Integrity Testing  - mVBI: intact   - Sharp Abelardo: intact  - Alar Stability Test: intact       Coordination Screen  - Dysmetria: intact  - Dysdiadochokinesia: intact       Outcome Measures 10/22/24 11/11/24 12/18/24 1/23/25     mCTSIB  - FTEO (firm)  - FTEC (firm)  - FTEO (foam)  - FTEC (foam)   30 sec  30 sec  30 sec  5 sec 30 sec  30 sec  30 sec +   30 sec ++ 30 sec  30 sec  30 sec +   30 sec ++ 30 sec  30 sec   30 sec  30 sec +     FGA 11/30 15/30 18/30 24/30     6 MWT  700 ft with walker  760 feet with SPC  725 ft wo AD     10 MWT  0.78 m/s with walker  0.75 m/s without AD 0.85 m/s without AD      5 x STS   15.03 sec, pushing off thighs  12.90 seconds, pushing of thighs 13.16 sec, pushing off thighs      TUG  13.94 sec with walker  13.62 sec without AD  10.15 sec wo AD                  Precautions: HT  Past Medical History:   Diagnosis Date    Coronary artery disease     " Dizziness     Ear problems     Hyperlipidemia     Hypertension     Hypothyroidism     MI, old     UTI (urinary tract infection) 07/26/2022

## 2025-01-27 ENCOUNTER — APPOINTMENT (OUTPATIENT)
Facility: CLINIC | Age: 85
End: 2025-01-27
Payer: MEDICARE

## 2025-01-29 ENCOUNTER — APPOINTMENT (OUTPATIENT)
Facility: CLINIC | Age: 85
End: 2025-01-29
Payer: MEDICARE

## 2025-02-07 ENCOUNTER — APPOINTMENT (OUTPATIENT)
Dept: LAB | Facility: CLINIC | Age: 85
End: 2025-02-07
Payer: MEDICARE

## 2025-02-07 DIAGNOSIS — E78.00 HYPERCHOLESTEROLEMIA: ICD-10-CM

## 2025-02-07 DIAGNOSIS — Z79.899 ENCOUNTER FOR LONG-TERM (CURRENT) USE OF MEDICATIONS: ICD-10-CM

## 2025-02-07 DIAGNOSIS — E78.1 HYPERTRIGLYCERIDEMIA: ICD-10-CM

## 2025-02-07 LAB
ALBUMIN SERPL BCG-MCNC: 4 G/DL (ref 3.5–5)
ALP SERPL-CCNC: 45 U/L (ref 34–104)
ALT SERPL W P-5'-P-CCNC: 7 U/L (ref 7–52)
ANION GAP SERPL CALCULATED.3IONS-SCNC: 9 MMOL/L (ref 4–13)
AST SERPL W P-5'-P-CCNC: 15 U/L (ref 13–39)
BACTERIA UR QL AUTO: ABNORMAL /HPF
BILIRUB DIRECT SERPL-MCNC: 0.09 MG/DL (ref 0–0.2)
BILIRUB SERPL-MCNC: 0.35 MG/DL (ref 0.2–1)
BILIRUB UR QL STRIP: NEGATIVE
BUN SERPL-MCNC: 22 MG/DL (ref 5–25)
CALCIUM SERPL-MCNC: 9.6 MG/DL (ref 8.4–10.2)
CHLORIDE SERPL-SCNC: 102 MMOL/L (ref 96–108)
CHOLEST SERPL-MCNC: 117 MG/DL (ref ?–200)
CK SERPL-CCNC: 47 U/L (ref 26–192)
CLARITY UR: CLEAR
CO2 SERPL-SCNC: 29 MMOL/L (ref 21–32)
COLOR UR: ABNORMAL
CREAT SERPL-MCNC: 1.08 MG/DL (ref 0.6–1.3)
EST. AVERAGE GLUCOSE BLD GHB EST-MCNC: 128 MG/DL
GFR SERPL CREATININE-BSD FRML MDRD: 46 ML/MIN/1.73SQ M
GLUCOSE P FAST SERPL-MCNC: 112 MG/DL (ref 65–99)
GLUCOSE UR STRIP-MCNC: NEGATIVE MG/DL
HBA1C MFR BLD: 6.1 %
HDLC SERPL-MCNC: 50 MG/DL
HGB UR QL STRIP.AUTO: NEGATIVE
KETONES UR STRIP-MCNC: NEGATIVE MG/DL
LDLC SERPL CALC-MCNC: 49 MG/DL (ref 0–100)
LEUKOCYTE ESTERASE UR QL STRIP: ABNORMAL
NITRITE UR QL STRIP: NEGATIVE
NON-SQ EPI CELLS URNS QL MICRO: ABNORMAL /HPF
NONHDLC SERPL-MCNC: 67 MG/DL
PH UR STRIP.AUTO: 5.5 [PH]
POTASSIUM SERPL-SCNC: 4 MMOL/L (ref 3.5–5.3)
PROT SERPL-MCNC: 7.4 G/DL (ref 6.4–8.4)
PROT UR STRIP-MCNC: NEGATIVE MG/DL
RBC #/AREA URNS AUTO: ABNORMAL /HPF
SODIUM SERPL-SCNC: 140 MMOL/L (ref 135–147)
SP GR UR STRIP.AUTO: 1.01 (ref 1–1.03)
TRIGL SERPL-MCNC: 90 MG/DL (ref ?–150)
UROBILINOGEN UR STRIP-ACNC: <2 MG/DL
WBC #/AREA URNS AUTO: ABNORMAL /HPF

## 2025-02-07 PROCEDURE — 80076 HEPATIC FUNCTION PANEL: CPT

## 2025-02-07 PROCEDURE — 83036 HEMOGLOBIN GLYCOSYLATED A1C: CPT

## 2025-02-07 PROCEDURE — 80061 LIPID PANEL: CPT

## 2025-02-07 PROCEDURE — 36415 COLL VENOUS BLD VENIPUNCTURE: CPT

## 2025-02-07 PROCEDURE — 82550 ASSAY OF CK (CPK): CPT

## 2025-02-07 PROCEDURE — 81001 URINALYSIS AUTO W/SCOPE: CPT

## 2025-02-07 PROCEDURE — 80048 BASIC METABOLIC PNL TOTAL CA: CPT

## 2025-02-11 DIAGNOSIS — E78.2 MIXED HYPERLIPIDEMIA: ICD-10-CM

## 2025-02-12 RX ORDER — SIMVASTATIN 20 MG
20 TABLET ORAL
Qty: 90 TABLET | Refills: 1 | Status: SHIPPED | OUTPATIENT
Start: 2025-02-12

## 2025-02-13 ENCOUNTER — OFFICE VISIT (OUTPATIENT)
Dept: FAMILY MEDICINE CLINIC | Facility: CLINIC | Age: 85
End: 2025-02-13
Payer: MEDICARE

## 2025-02-13 VITALS
RESPIRATION RATE: 16 BRPM | DIASTOLIC BLOOD PRESSURE: 58 MMHG | SYSTOLIC BLOOD PRESSURE: 100 MMHG | TEMPERATURE: 97.5 F | HEIGHT: 62 IN | BODY MASS INDEX: 30.73 KG/M2 | WEIGHT: 167 LBS | HEART RATE: 67 BPM

## 2025-02-13 DIAGNOSIS — R19.7 DIARRHEA, UNSPECIFIED TYPE: Primary | ICD-10-CM

## 2025-02-13 DIAGNOSIS — N18.30 STAGE 3 CHRONIC KIDNEY DISEASE, UNSPECIFIED WHETHER STAGE 3A OR 3B CKD (HCC): ICD-10-CM

## 2025-02-13 PROCEDURE — G2211 COMPLEX E/M VISIT ADD ON: HCPCS | Performed by: INTERNAL MEDICINE

## 2025-02-13 PROCEDURE — 99213 OFFICE O/P EST LOW 20 MIN: CPT | Performed by: INTERNAL MEDICINE

## 2025-02-13 NOTE — PROGRESS NOTES
"Name: Adelina Dyer      : 1940      MRN: [id number]  Encounter Provider: Destinee Wong MD  Encounter Date: 2025   Encounter department: [facility]  :  Assessment & Plan  Diarrhea, unspecified type  She is up to date with colonoscopy, done last . If stool studies negative, t/c welchol or other bile acid sequestrant to see if this will help symptomatically.  Orders:  •  Stool Enteric Bacterial Panel by PCR  •  Clostridium difficile toxin by PCR with EIA    Stage 3 chronic kidney disease, unspecified whether stage 3a or 3b CKD ([facility])  Lab Results   Component Value Date    EGFR 46 2025    EGFR 46 10/05/2024    EGFR 45 10/04/2024    CREATININE 1.08 2025    CREATININE 1.08 10/05/2024    CREATININE 1.10 10/04/2024   Stable and will continue to monitor.              History of Present Illness   Here with diarrhea for one month.  Started around [place].  It will intermittently be a little better but always comes back. She has a lot of gas and will have a small BM every time she has to pass gas. There is no incontinence, weight loss, fever, sweats.  Can have some crampy lower abdominal pain.  Denies recent antibiotics.  Does not have well water.       Review of Systems   Constitutional: Negative.    Respiratory: Negative.     Cardiovascular: Negative.    Gastrointestinal:  Positive for diarrhea.        Occasional cramping       Objective   /58   Pulse 67   Temp 97.5 °F (36.4 °C)   Resp 16   Ht 5' 1.5\" (1.562 m)   Wt 75.8 kg (167 lb)   BMI 31.04 kg/m²      Physical Exam  Constitutional:       Appearance: Normal appearance.   HENT:      Head: Normocephalic and atraumatic.   Eyes:      Pupils: Pupils are equal, round, and reactive to light.   Cardiovascular:      Rate and Rhythm: Normal rate and regular rhythm.      Heart sounds: No murmur heard.     No friction rub. No gallop.   Pulmonary:      Effort: Pulmonary effort is normal.      Breath sounds: Normal breath " sounds. No wheezing or rales.   Abdominal:      General: Abdomen is flat. Bowel sounds are normal.      Palpations: Abdomen is soft.      Tenderness: There is no abdominal tenderness.   Musculoskeletal:         General: No swelling.   Neurological:      Mental Status: She is alert.

## 2025-02-13 NOTE — ASSESSMENT & PLAN NOTE
Lab Results   Component Value Date    EGFR 46 02/07/2025    EGFR 46 10/05/2024    EGFR 45 10/04/2024    CREATININE 1.08 02/07/2025    CREATININE 1.08 10/05/2024    CREATININE 1.10 10/04/2024   Stable and will continue to monitor.

## 2025-02-14 ENCOUNTER — APPOINTMENT (OUTPATIENT)
Dept: LAB | Facility: CLINIC | Age: 85
End: 2025-02-14

## 2025-02-19 ENCOUNTER — APPOINTMENT (OUTPATIENT)
Dept: LAB | Facility: CLINIC | Age: 85
End: 2025-02-19
Payer: MEDICARE

## 2025-02-19 PROCEDURE — 87505 NFCT AGENT DETECTION GI: CPT | Performed by: INTERNAL MEDICINE

## 2025-02-20 ENCOUNTER — RESULTS FOLLOW-UP (OUTPATIENT)
Dept: FAMILY MEDICINE CLINIC | Facility: CLINIC | Age: 85
End: 2025-02-20

## 2025-02-20 LAB
C COLI+JEJUNI TUF STL QL NAA+PROBE: NEGATIVE
C DIFF TOX GENS STL QL NAA+PROBE: NEGATIVE
EC STX1+STX2 GENES STL QL NAA+PROBE: NEGATIVE
SALMONELLA SP SPAO STL QL NAA+PROBE: NEGATIVE
SHIGELLA SP+EIEC IPAH STL QL NAA+PROBE: NEGATIVE

## 2025-02-24 NOTE — TELEPHONE ENCOUNTER
Pt called to ask what medication she should take for the diarrhea.  She saw the result message from Dr Wong, but wondered what she should be taking.  Please send Rx to [facility] in [place] and call pt to let her know.  Thank you!

## 2025-05-14 ENCOUNTER — OFFICE VISIT (OUTPATIENT)
Dept: FAMILY MEDICINE CLINIC | Facility: CLINIC | Age: 85
End: 2025-05-14
Payer: MEDICARE

## 2025-05-14 VITALS
SYSTOLIC BLOOD PRESSURE: 108 MMHG | HEART RATE: 66 BPM | BODY MASS INDEX: 31.21 KG/M2 | TEMPERATURE: 97.7 F | WEIGHT: 169.6 LBS | RESPIRATION RATE: 16 BRPM | OXYGEN SATURATION: 97 % | DIASTOLIC BLOOD PRESSURE: 60 MMHG | HEIGHT: 62 IN

## 2025-05-14 DIAGNOSIS — E78.2 MIXED HYPERLIPIDEMIA: ICD-10-CM

## 2025-05-14 DIAGNOSIS — E03.9 HYPOTHYROIDISM, UNSPECIFIED TYPE: ICD-10-CM

## 2025-05-14 DIAGNOSIS — K90.9 DIARRHEA DUE TO MALABSORPTION: ICD-10-CM

## 2025-05-14 DIAGNOSIS — R10.13 EPIGASTRIC PAIN: ICD-10-CM

## 2025-05-14 DIAGNOSIS — R19.7 DIARRHEA DUE TO MALABSORPTION: ICD-10-CM

## 2025-05-14 DIAGNOSIS — I10 ESSENTIAL HYPERTENSION: Primary | ICD-10-CM

## 2025-05-14 PROBLEM — K52.9 CHRONIC DIARRHEA: Status: ACTIVE | Noted: 2025-05-14

## 2025-05-14 PROCEDURE — 99214 OFFICE O/P EST MOD 30 MIN: CPT | Performed by: INTERNAL MEDICINE

## 2025-05-14 PROCEDURE — G2211 COMPLEX E/M VISIT ADD ON: HCPCS | Performed by: INTERNAL MEDICINE

## 2025-05-14 NOTE — ASSESSMENT & PLAN NOTE
Clinically euthyroid. Will check labs prior to follow up visit.  Continue levothyroxine 125 mcg daily.  Orders:  •  CBC; Future  •  Comprehensive metabolic panel; Future  •  Lipid panel; Future  •  TSH, 3rd generation with Free T4 reflex; Future

## 2025-05-14 NOTE — ASSESSMENT & PLAN NOTE
Due to ongoing issues, now with abdominal pain, will check imaging.  Patient will f/u after results.   Orders:  •  CT abdomen pelvis wo contrast; Future

## 2025-05-14 NOTE — ASSESSMENT & PLAN NOTE
Well controlled on valsartan and metoprolol and will continue as ordered  Denies orthostatic symptoms.   Orders:  •  CBC; Future  •  Comprehensive metabolic panel; Future  •  Lipid panel; Future  •  TSH, 3rd generation with Free T4 reflex; Future

## 2025-05-14 NOTE — PROGRESS NOTES
"Name: Adelina Dyer      : 1940      MRN: [id number]  Encounter Provider: Destinee Wong MD  Encounter Date: 2025   Encounter department: [facility]  :  Assessment & Plan  Essential hypertension  Well controlled on valsartan and metoprolol and will continue as ordered  Denies orthostatic symptoms.   Orders:  •  CBC; Future  •  Comprehensive metabolic panel; Future  •  Lipid panel; Future  •  TSH, 3rd generation with Free T4 reflex; Future    Hypothyroidism, unspecified type  Clinically euthyroid. Will check labs prior to follow up visit.  Continue levothyroxine 125 mcg daily.  Orders:  •  CBC; Future  •  Comprehensive metabolic panel; Future  •  Lipid panel; Future  •  TSH, 3rd generation with Free T4 reflex; Future    Mixed hyperlipidemia  Stable and will continue zocor 20 mg. Check labs for lipids and LFT's.  Orders:  •  CBC; Future  •  Comprehensive metabolic panel; Future  •  Lipid panel; Future  •  TSH, 3rd generation with Free T4 reflex; Future    Diarrhea due to malabsorption  Due to ongoing issues, now with abdominal pain, will check imaging.  Patient will f/u after results.   Orders:  •  CT abdomen pelvis wo contrast; Future    Epigastric pain    Orders:  •  CT abdomen pelvis wo contrast; Future           History of Present Illness   Here for a follow up visit.  She continues to have diarrhea.  Is only taking the questran once a day because twice a day bound her up too much.  She will play around with this.  She has been having intermittent epigastric discomfort \"like a band.\"  There is no weight loss or early satiety.    Otherwise is doing well at home.  No falls. Trying to stay active.  Denies other issues.       Review of Systems   Constitutional: Negative.    Respiratory: Negative.     Cardiovascular: Negative.    Gastrointestinal:  Positive for diarrhea. Negative for abdominal distention, abdominal pain, anal bleeding, blood in stool and constipation.   Endocrine: " [employer] is authorized from 6/19/19 through 1/14/2021. "Negative for cold intolerance and heat intolerance.       Objective   /60   Pulse 66   Temp 97.7 °F (36.5 °C)   Resp 16   Ht 5' 1.5\" (1.562 m)   Wt 76.9 kg (169 lb 9.6 oz)   SpO2 97%   BMI 31.53 kg/m²      Physical Exam  Constitutional:       Appearance: Normal appearance.   HENT:      Head: Normocephalic and atraumatic.     Eyes:      Pupils: Pupils are equal, round, and reactive to light.       Cardiovascular:      Rate and Rhythm: Normal rate and regular rhythm.      Heart sounds: No murmur heard.     No friction rub. No gallop.   Pulmonary:      Effort: Pulmonary effort is normal.      Breath sounds: Normal breath sounds. No wheezing or rales.   Abdominal:      General: Abdomen is flat. Bowel sounds are normal.      Palpations: Abdomen is soft.      Tenderness: There is no abdominal tenderness.     Musculoskeletal:         General: No swelling.     Neurological:      Mental Status: She is alert.         "

## 2025-05-23 DIAGNOSIS — I10 PRIMARY HYPERTENSION: ICD-10-CM

## 2025-05-24 RX ORDER — METOPROLOL SUCCINATE 25 MG/1
25 TABLET, EXTENDED RELEASE ORAL DAILY
Qty: 90 TABLET | Refills: 1 | Status: SHIPPED | OUTPATIENT
Start: 2025-05-24

## 2025-05-29 ENCOUNTER — HOSPITAL ENCOUNTER (OUTPATIENT)
Dept: RADIOLOGY | Facility: HOSPITAL | Age: 85
Discharge: HOME | End: 2025-05-29
Attending: INTERNAL MEDICINE
Payer: MEDICARE

## 2025-05-29 DIAGNOSIS — R19.7 DIARRHEA DUE TO MALABSORPTION: ICD-10-CM

## 2025-05-29 DIAGNOSIS — R10.13 EPIGASTRIC PAIN: ICD-10-CM

## 2025-05-29 DIAGNOSIS — K90.9 DIARRHEA DUE TO MALABSORPTION: ICD-10-CM

## 2025-05-29 PROCEDURE — 74176 CT ABD & PELVIS W/O CONTRAST: CPT

## 2025-06-05 ENCOUNTER — TELEPHONE (OUTPATIENT)
Age: 85
End: 2025-06-05

## 2025-06-05 ENCOUNTER — RESULTS FOLLOW-UP (OUTPATIENT)
Dept: FAMILY MEDICINE CLINIC | Facility: CLINIC | Age: 85
End: 2025-06-05

## 2025-06-05 NOTE — TELEPHONE ENCOUNTER
Patient requesting a call back to discuss test results.       Ordering Provider: Dr. Wong  Date Completed: 5/29/25    Lab []  MRI []  X-Ray []  CT [x]  Colonoscopy []  EGD []  Biopsy []  Other []

## 2025-07-01 NOTE — TELEPHONE ENCOUNTER
PT called inquiring about the CT scan results. She said she is unable to get into her My chart to see any messages regarding the results. She was informed and acknowledged message below.

## 2025-07-31 DIAGNOSIS — E03.9 HYPOTHYROIDISM, UNSPECIFIED TYPE: ICD-10-CM

## 2025-08-01 RX ORDER — LEVOTHYROXINE SODIUM 125 UG/1
125 TABLET ORAL DAILY
Qty: 90 TABLET | Refills: 1 | Status: SHIPPED | OUTPATIENT
Start: 2025-08-01 | End: 2026-07-27

## (undated) DEVICE — B-H IRRIGATING CAN 19GA FLAT ANGLED 8MM: Brand: OPHTHALMIC CANNULA

## (undated) DEVICE — CLEARCUT® SLIT KNIFE INTREPID MICRO-COAXIAL SYSTEM 2.4 SB: Brand: CLEARCUT®; INTREPID

## (undated) DEVICE — MICROSURGICAL INSTRUMENT IRR. CYSTITOME 25GA STRAIGHT-REVERSE CUTTING: Brand: ALCON

## (undated) DEVICE — ACTIVE FMS W/ INTREPID* ULTRA SLEEVES, 0.9MM 45° ABS* INTREPID* BALANCED TIP: Brand: ALCON

## (undated) DEVICE — INTREPID® TRANSFORMER IA HP: Brand: INTREPID®

## (undated) DEVICE — EYE PACK CUSTOM -FINNEGAN

## (undated) DEVICE — AIR INJECT CANNULA 27GA: Brand: OPHTHALMIC CANNULA

## (undated) DEVICE — GLOVE SRG BIOGEL 7.5

## (undated) DEVICE — THE MONARCH® "D" CARTRIDGE IS A SINGLE-USE POLYPROPYLENE CARTRIDGE FOR POSTERIOR CHAMBER IOL DELIVERY: Brand: MONARCH® III